# Patient Record
Sex: MALE | Race: WHITE | Employment: OTHER | URBAN - NONMETROPOLITAN AREA
[De-identification: names, ages, dates, MRNs, and addresses within clinical notes are randomized per-mention and may not be internally consistent; named-entity substitution may affect disease eponyms.]

---

## 2017-05-11 ENCOUNTER — TELEPHONE (OUTPATIENT)
Dept: INTERNAL MEDICINE | Age: 82
End: 2017-05-11

## 2017-05-11 ENCOUNTER — OFFICE VISIT (OUTPATIENT)
Dept: INTERNAL MEDICINE | Age: 82
End: 2017-05-11

## 2017-05-11 VITALS
HEIGHT: 72 IN | BODY MASS INDEX: 22.96 KG/M2 | DIASTOLIC BLOOD PRESSURE: 70 MMHG | SYSTOLIC BLOOD PRESSURE: 136 MMHG | WEIGHT: 169.5 LBS

## 2017-05-11 DIAGNOSIS — T50.905S MEDICATION ADVERSE EFFECT, SEQUELA: ICD-10-CM

## 2017-05-11 DIAGNOSIS — I10 ESSENTIAL HYPERTENSION: Primary | ICD-10-CM

## 2017-05-11 PROCEDURE — 99213 OFFICE O/P EST LOW 20 MIN: CPT | Performed by: INTERNAL MEDICINE

## 2017-05-11 PROCEDURE — 93000 ELECTROCARDIOGRAM COMPLETE: CPT | Performed by: INTERNAL MEDICINE

## 2017-05-11 RX ORDER — AMLODIPINE BESYLATE 10 MG/1
TABLET ORAL
Qty: 90 TABLET | Refills: 3 | Status: SHIPPED | OUTPATIENT
Start: 2017-05-11 | End: 2018-02-19 | Stop reason: SDUPTHER

## 2017-05-11 RX ORDER — LISINOPRIL 10 MG/1
TABLET ORAL
Qty: 180 TABLET | Refills: 3 | Status: SHIPPED | OUTPATIENT
Start: 2017-05-11 | End: 2018-02-19 | Stop reason: SDUPTHER

## 2017-05-11 ASSESSMENT — PATIENT HEALTH QUESTIONNAIRE - PHQ9
SUM OF ALL RESPONSES TO PHQ9 QUESTIONS 1 & 2: 0
2. FEELING DOWN, DEPRESSED OR HOPELESS: 0
SUM OF ALL RESPONSES TO PHQ QUESTIONS 1-9: 0
1. LITTLE INTEREST OR PLEASURE IN DOING THINGS: 0

## 2017-06-06 ENCOUNTER — TELEPHONE (OUTPATIENT)
Dept: INTERNAL MEDICINE | Age: 82
End: 2017-06-06

## 2018-02-20 RX ORDER — LISINOPRIL 10 MG/1
TABLET ORAL
Qty: 180 TABLET | Refills: 3 | Status: SHIPPED | OUTPATIENT
Start: 2018-02-20 | End: 2018-12-11 | Stop reason: SDUPTHER

## 2018-02-20 RX ORDER — AMLODIPINE BESYLATE 10 MG/1
TABLET ORAL
Qty: 90 TABLET | Refills: 3 | Status: SHIPPED | OUTPATIENT
Start: 2018-02-20 | End: 2018-12-11 | Stop reason: SDUPTHER

## 2018-04-12 ENCOUNTER — TELEPHONE (OUTPATIENT)
Dept: INTERNAL MEDICINE CLINIC | Age: 83
End: 2018-04-12

## 2018-04-16 ENCOUNTER — TELEPHONE (OUTPATIENT)
Dept: INTERNAL MEDICINE CLINIC | Age: 83
End: 2018-04-16

## 2018-04-17 ENCOUNTER — OFFICE VISIT (OUTPATIENT)
Dept: INTERNAL MEDICINE CLINIC | Age: 83
End: 2018-04-17
Payer: MEDICARE

## 2018-04-17 VITALS
SYSTOLIC BLOOD PRESSURE: 138 MMHG | HEIGHT: 72 IN | DIASTOLIC BLOOD PRESSURE: 78 MMHG | BODY MASS INDEX: 23.16 KG/M2 | HEART RATE: 76 BPM | WEIGHT: 171 LBS | TEMPERATURE: 98 F

## 2018-04-17 DIAGNOSIS — J20.9 ACUTE BRONCHITIS, UNSPECIFIED ORGANISM: Primary | ICD-10-CM

## 2018-04-17 PROCEDURE — G8420 CALC BMI NORM PARAMETERS: HCPCS | Performed by: INTERNAL MEDICINE

## 2018-04-17 PROCEDURE — G8427 DOCREV CUR MEDS BY ELIG CLIN: HCPCS | Performed by: INTERNAL MEDICINE

## 2018-04-17 PROCEDURE — 1036F TOBACCO NON-USER: CPT | Performed by: INTERNAL MEDICINE

## 2018-04-17 PROCEDURE — 4040F PNEUMOC VAC/ADMIN/RCVD: CPT | Performed by: INTERNAL MEDICINE

## 2018-04-17 PROCEDURE — 1123F ACP DISCUSS/DSCN MKR DOCD: CPT | Performed by: INTERNAL MEDICINE

## 2018-04-17 PROCEDURE — 99213 OFFICE O/P EST LOW 20 MIN: CPT | Performed by: INTERNAL MEDICINE

## 2018-04-17 RX ORDER — CETIRIZINE HYDROCHLORIDE 10 MG/1
10 TABLET ORAL DAILY
COMMUNITY
End: 2018-05-02 | Stop reason: ALTCHOICE

## 2018-04-17 RX ORDER — FLUTICASONE PROPIONATE 50 MCG
1 SPRAY, SUSPENSION (ML) NASAL DAILY
COMMUNITY
End: 2018-05-02 | Stop reason: ALTCHOICE

## 2018-04-17 RX ORDER — GUAIFENESIN 600 MG/1
1200 TABLET, EXTENDED RELEASE ORAL 2 TIMES DAILY
COMMUNITY
End: 2018-05-02 | Stop reason: ALTCHOICE

## 2018-04-17 RX ORDER — AZITHROMYCIN 250 MG/1
TABLET, FILM COATED ORAL
Qty: 1 PACKET | Refills: 0 | Status: SHIPPED | OUTPATIENT
Start: 2018-04-17 | End: 2018-05-02 | Stop reason: ALTCHOICE

## 2018-05-02 ENCOUNTER — TELEPHONE (OUTPATIENT)
Dept: INTERNAL MEDICINE CLINIC | Age: 83
End: 2018-05-02

## 2018-05-02 ENCOUNTER — OFFICE VISIT (OUTPATIENT)
Dept: INTERNAL MEDICINE CLINIC | Age: 83
End: 2018-05-02
Payer: MEDICARE

## 2018-05-02 ENCOUNTER — HOSPITAL ENCOUNTER (OUTPATIENT)
Age: 83
Discharge: HOME | End: 2018-05-02
Payer: MEDICARE

## 2018-05-02 VITALS
BODY MASS INDEX: 22.48 KG/M2 | DIASTOLIC BLOOD PRESSURE: 70 MMHG | WEIGHT: 166 LBS | SYSTOLIC BLOOD PRESSURE: 134 MMHG | HEIGHT: 72 IN

## 2018-05-02 DIAGNOSIS — I10 ESSENTIAL HYPERTENSION: Primary | ICD-10-CM

## 2018-05-02 DIAGNOSIS — R19.00 ABDOMINAL MASS, UNSPECIFIED ABDOMINAL LOCATION: ICD-10-CM

## 2018-05-02 DIAGNOSIS — T50.905S ADVERSE EFFECT OF DRUG, SEQUELA: ICD-10-CM

## 2018-05-02 DIAGNOSIS — I10 ESSENTIAL HYPERTENSION: ICD-10-CM

## 2018-05-02 LAB
ALBUMIN SERPL-MCNC: 4.4 G/DL (ref 3.5–5.1)
ALP BLD-CCNC: 125 U/L (ref 38–126)
ALT SERPL-CCNC: 31 U/L (ref 11–66)
ANION GAP SERPL CALCULATED.3IONS-SCNC: 11 MEQ/L (ref 8–16)
AST SERPL-CCNC: 29 U/L (ref 5–40)
BILIRUB SERPL-MCNC: 0.5 MG/DL (ref 0.3–1.2)
BUN BLDV-MCNC: 18 MG/DL (ref 7–22)
CALCIUM SERPL-MCNC: 9.6 MG/DL (ref 8.5–10.5)
CHLORIDE BLD-SCNC: 102 MEQ/L (ref 98–111)
CO2: 30 MEQ/L (ref 23–33)
CREAT SERPL-MCNC: 0.9 MG/DL (ref 0.4–1.2)
GFR SERPL CREATININE-BSD FRML MDRD: 81 ML/MIN/1.73M2
GLUCOSE BLD-MCNC: 99 MG/DL (ref 70–108)
POTASSIUM SERPL-SCNC: 4 MEQ/L (ref 3.5–5.2)
SODIUM BLD-SCNC: 143 MEQ/L (ref 135–145)
TOTAL PROTEIN: 7.6 G/DL (ref 6.1–8)

## 2018-05-02 PROCEDURE — 80053 COMPREHEN METABOLIC PANEL: CPT

## 2018-05-02 PROCEDURE — 1123F ACP DISCUSS/DSCN MKR DOCD: CPT | Performed by: INTERNAL MEDICINE

## 2018-05-02 PROCEDURE — 4040F PNEUMOC VAC/ADMIN/RCVD: CPT | Performed by: INTERNAL MEDICINE

## 2018-05-02 PROCEDURE — 36415 COLL VENOUS BLD VENIPUNCTURE: CPT

## 2018-05-02 PROCEDURE — 1036F TOBACCO NON-USER: CPT | Performed by: INTERNAL MEDICINE

## 2018-05-02 PROCEDURE — 99213 OFFICE O/P EST LOW 20 MIN: CPT | Performed by: INTERNAL MEDICINE

## 2018-05-02 PROCEDURE — G8427 DOCREV CUR MEDS BY ELIG CLIN: HCPCS | Performed by: INTERNAL MEDICINE

## 2018-05-02 PROCEDURE — 93000 ELECTROCARDIOGRAM COMPLETE: CPT | Performed by: INTERNAL MEDICINE

## 2018-05-02 PROCEDURE — G8420 CALC BMI NORM PARAMETERS: HCPCS | Performed by: INTERNAL MEDICINE

## 2018-05-03 ENCOUNTER — TELEPHONE (OUTPATIENT)
Dept: INTERNAL MEDICINE CLINIC | Age: 83
End: 2018-05-03

## 2018-05-07 ENCOUNTER — TELEPHONE (OUTPATIENT)
Dept: INTERNAL MEDICINE CLINIC | Age: 83
End: 2018-05-07

## 2018-05-08 ENCOUNTER — HOSPITAL ENCOUNTER (OUTPATIENT)
Dept: CT IMAGING | Age: 83
Discharge: HOME | End: 2018-05-08
Payer: MEDICARE

## 2018-05-08 ENCOUNTER — TELEPHONE (OUTPATIENT)
Dept: INTERNAL MEDICINE CLINIC | Age: 83
End: 2018-05-08

## 2018-05-08 DIAGNOSIS — R19.00 ABDOMINAL MASS, UNSPECIFIED ABDOMINAL LOCATION: ICD-10-CM

## 2018-05-08 PROCEDURE — 6360000004 HC RX CONTRAST MEDICATION: Performed by: INTERNAL MEDICINE

## 2018-05-08 PROCEDURE — 74177 CT ABD & PELVIS W/CONTRAST: CPT

## 2018-05-08 RX ADMIN — IOPAMIDOL 85 ML: 755 INJECTION, SOLUTION INTRAVENOUS at 13:02

## 2018-05-08 RX ADMIN — IOHEXOL 50 ML: 240 INJECTION, SOLUTION INTRATHECAL; INTRAVASCULAR; INTRAVENOUS; ORAL at 13:02

## 2018-05-15 ENCOUNTER — TELEPHONE (OUTPATIENT)
Dept: INTERNAL MEDICINE CLINIC | Age: 83
End: 2018-05-15

## 2018-06-06 ENCOUNTER — OFFICE VISIT (OUTPATIENT)
Dept: INTERNAL MEDICINE CLINIC | Age: 83
End: 2018-06-06
Payer: MEDICARE

## 2018-06-06 VITALS
HEART RATE: 68 BPM | WEIGHT: 168 LBS | DIASTOLIC BLOOD PRESSURE: 80 MMHG | BODY MASS INDEX: 22.75 KG/M2 | SYSTOLIC BLOOD PRESSURE: 136 MMHG | HEIGHT: 72 IN

## 2018-06-06 DIAGNOSIS — Z86.010 HX OF ADENOMATOUS COLONIC POLYPS: Primary | ICD-10-CM

## 2018-06-06 PROCEDURE — 1123F ACP DISCUSS/DSCN MKR DOCD: CPT | Performed by: INTERNAL MEDICINE

## 2018-06-06 PROCEDURE — G8420 CALC BMI NORM PARAMETERS: HCPCS | Performed by: INTERNAL MEDICINE

## 2018-06-06 PROCEDURE — 1036F TOBACCO NON-USER: CPT | Performed by: INTERNAL MEDICINE

## 2018-06-06 PROCEDURE — G8427 DOCREV CUR MEDS BY ELIG CLIN: HCPCS | Performed by: INTERNAL MEDICINE

## 2018-06-06 PROCEDURE — 99203 OFFICE O/P NEW LOW 30 MIN: CPT | Performed by: INTERNAL MEDICINE

## 2018-06-06 PROCEDURE — 4040F PNEUMOC VAC/ADMIN/RCVD: CPT | Performed by: INTERNAL MEDICINE

## 2018-06-06 RX ORDER — POLYETHYLENE GLYCOL 3350 17 G/17G
250 POWDER, FOR SOLUTION ORAL DAILY
Qty: 250 BOTTLE | Refills: 0 | Status: SHIPPED | OUTPATIENT
Start: 2018-06-06 | End: 2018-06-07

## 2018-06-06 RX ORDER — CETIRIZINE HYDROCHLORIDE 10 MG/1
10 TABLET ORAL DAILY PRN
COMMUNITY
End: 2019-03-21

## 2018-06-06 ASSESSMENT — PATIENT HEALTH QUESTIONNAIRE - PHQ9
SUM OF ALL RESPONSES TO PHQ QUESTIONS 1-9: 0
1. LITTLE INTEREST OR PLEASURE IN DOING THINGS: 0
2. FEELING DOWN, DEPRESSED OR HOPELESS: 0
SUM OF ALL RESPONSES TO PHQ9 QUESTIONS 1 & 2: 0

## 2018-06-11 ENCOUNTER — HOSPITAL ENCOUNTER (OUTPATIENT)
Age: 83
Setting detail: OUTPATIENT SURGERY
Discharge: HOME | End: 2018-06-11
Attending: INTERNAL MEDICINE | Admitting: INTERNAL MEDICINE
Payer: MEDICARE

## 2018-06-11 VITALS
HEIGHT: 72 IN | OXYGEN SATURATION: 98 % | DIASTOLIC BLOOD PRESSURE: 83 MMHG | WEIGHT: 162.8 LBS | SYSTOLIC BLOOD PRESSURE: 159 MMHG | HEART RATE: 65 BPM | RESPIRATION RATE: 18 BRPM | TEMPERATURE: 97.4 F | BODY MASS INDEX: 22.05 KG/M2

## 2018-06-11 PROCEDURE — 3609027000 HC COLONOSCOPY: Performed by: INTERNAL MEDICINE

## 2018-06-11 PROCEDURE — 99153 MOD SED SAME PHYS/QHP EA: CPT | Performed by: INTERNAL MEDICINE

## 2018-06-11 PROCEDURE — 2580000003 HC RX 258: Performed by: INTERNAL MEDICINE

## 2018-06-11 PROCEDURE — 99152 MOD SED SAME PHYS/QHP 5/>YRS: CPT | Performed by: INTERNAL MEDICINE

## 2018-06-11 PROCEDURE — G0105 COLORECTAL SCRN; HI RISK IND: HCPCS | Performed by: INTERNAL MEDICINE

## 2018-06-11 PROCEDURE — 6360000002 HC RX W HCPCS: Performed by: INTERNAL MEDICINE

## 2018-06-11 RX ORDER — MIDAZOLAM HYDROCHLORIDE 1 MG/ML
INJECTION INTRAMUSCULAR; INTRAVENOUS PRN
Status: DISCONTINUED | OUTPATIENT
Start: 2018-06-11 | End: 2018-06-11 | Stop reason: HOSPADM

## 2018-06-11 RX ORDER — MEPERIDINE HYDROCHLORIDE 50 MG/ML
INJECTION INTRAMUSCULAR; INTRAVENOUS; SUBCUTANEOUS PRN
Status: DISCONTINUED | OUTPATIENT
Start: 2018-06-11 | End: 2018-06-11 | Stop reason: HOSPADM

## 2018-06-11 RX ORDER — SODIUM CHLORIDE 450 MG/100ML
INJECTION, SOLUTION INTRAVENOUS CONTINUOUS
Status: DISCONTINUED | OUTPATIENT
Start: 2018-06-11 | End: 2018-06-11 | Stop reason: HOSPADM

## 2018-06-11 RX ADMIN — SODIUM CHLORIDE: 4.5 INJECTION, SOLUTION INTRAVENOUS at 12:07

## 2018-06-11 ASSESSMENT — PAIN SCALES - GENERAL
PAINLEVEL_OUTOF10: 0
PAINLEVEL_OUTOF10: 0

## 2018-06-11 ASSESSMENT — PAIN - FUNCTIONAL ASSESSMENT: PAIN_FUNCTIONAL_ASSESSMENT: 0-10

## 2018-08-16 ENCOUNTER — HOSPITAL ENCOUNTER (EMERGENCY)
Age: 83
Discharge: HOME | End: 2018-08-16
Payer: MEDICARE

## 2018-08-16 VITALS
SYSTOLIC BLOOD PRESSURE: 177 MMHG | WEIGHT: 170 LBS | DIASTOLIC BLOOD PRESSURE: 80 MMHG | HEART RATE: 70 BPM | BODY MASS INDEX: 23.06 KG/M2 | OXYGEN SATURATION: 96 % | TEMPERATURE: 97.7 F | RESPIRATION RATE: 18 BRPM

## 2018-08-16 DIAGNOSIS — H66.012 ACUTE SUPPURATIVE OTITIS MEDIA OF LEFT EAR WITH SPONTANEOUS RUPTURE OF TYMPANIC MEMBRANE, RECURRENCE NOT SPECIFIED: Primary | ICD-10-CM

## 2018-08-16 PROCEDURE — 99213 OFFICE O/P EST LOW 20 MIN: CPT

## 2018-08-16 PROCEDURE — 99213 OFFICE O/P EST LOW 20 MIN: CPT | Performed by: NURSE PRACTITIONER

## 2018-08-16 RX ORDER — AMOXICILLIN AND CLAVULANATE POTASSIUM 875; 125 MG/1; MG/1
1 TABLET, FILM COATED ORAL 2 TIMES DAILY
Qty: 14 TABLET | Refills: 0 | Status: SHIPPED | OUTPATIENT
Start: 2018-08-16 | End: 2018-08-23

## 2018-08-16 RX ORDER — OFLOXACIN 3 MG/ML
10 SOLUTION AURICULAR (OTIC) DAILY
Qty: 10 ML | Refills: 0 | Status: SHIPPED | OUTPATIENT
Start: 2018-08-16 | End: 2018-08-23

## 2018-08-16 ASSESSMENT — PAIN DESCRIPTION - ORIENTATION: ORIENTATION: LEFT

## 2018-08-16 ASSESSMENT — ENCOUNTER SYMPTOMS
COUGH: 1
WHEEZING: 0
FACIAL SWELLING: 0
SHORTNESS OF BREATH: 0
CHEST TIGHTNESS: 0
SINUS PRESSURE: 0
RHINORRHEA: 1
COLOR CHANGE: 0
SORE THROAT: 0
TROUBLE SWALLOWING: 0
SINUS PAIN: 0

## 2018-08-16 ASSESSMENT — PAIN DESCRIPTION - LOCATION: LOCATION: EAR

## 2018-08-16 ASSESSMENT — PAIN DESCRIPTION - PAIN TYPE: TYPE: ACUTE PAIN

## 2018-08-16 ASSESSMENT — PAIN DESCRIPTION - DESCRIPTORS: DESCRIPTORS: ACHING

## 2018-08-16 ASSESSMENT — PAIN SCALES - GENERAL: PAINLEVEL_OUTOF10: 1

## 2018-08-16 NOTE — ED TRIAGE NOTES
Pt walked to room 7. Pt here with left ear pain. Started 4 days ago. Pt states he has been using mucinex, flonase and has been using cipro. Pt states feels like a lot of fluid in ear. Pt has been swimming.

## 2018-08-16 NOTE — ED PROVIDER NOTES
tonsillar, no preauricular, no posterior auricular and no occipital adenopathy present. Head (left side): No submental, no submandibular, no tonsillar, no preauricular, no posterior auricular and no occipital adenopathy present. He has no cervical adenopathy. He has no axillary adenopathy. Neurological: He is alert and oriented to person, place, and time. Skin: Skin is warm, dry and intact. No rash noted. Nursing note and vitals reviewed. DIAGNOSTIC RESULTS     Labs:No results found for this visit on 08/16/18. IMAGING:    No orders to display         EKG:      URGENT CARE COURSE:     Vitals:    08/16/18 0808   BP: (!) 177/80   Pulse: 70   Resp: 18   Temp: 97.7 °F (36.5 °C)   TempSrc: Oral   SpO2: 96%   Weight: 170 lb (77.1 kg)       Medications - No data to display         PROCEDURES:  None    FINAL IMPRESSION      1. Acute suppurative otitis media of left ear with spontaneous rupture of tympanic membrane, recurrence not specified          DISPOSITION/PLAN     The patient's physical exam is consistent with otitis media of the left ear with spontaneous tympanic membrane rupture. The patient was started on Augmentin and given ofloxacin. He is to take the medication as prescribed. He is to follow-up with his PCP in a week. He is to go to the ER for any worsening symptoms. The patient verbalized understanding of this plan of care. The patient was ambulatory out of the department in stable condition. The patient was agreeable to the plan of care and voiced no concerns at time of discharge.       PATIENT REFERRED TO:  Ramiro Erickson MD  [facility] [address] [address]      DISCHARGE MEDICATIONS:  Discharge Medication List as of 8/16/2018  8:30 AM      START taking these medications    Details   ofloxacin (FLOXIN) 0.3 % otic solution Place 10 drops into the left ear daily for 7 days, Disp-10 mL, R-0Normal      amoxicillin-clavulanate (AUGMENTIN) 875-125 MG per tablet Take 1 tablet by mouth 2 times daily for 7 days, Disp-14 tablet, R-0Normal             Discharge Medication List as of 8/16/2018  8:30 AM          Discharge Medication List as of 8/16/2018  8:30 AM          MARIKA Burns CNP    (Please note that portions of this note were completed with a voice recognition program.  Efforts were made to edit the dictations but occasionally words are mis-transcribed.)           MARIKA Burns CNP  08/16/18 2634

## 2018-08-27 ENCOUNTER — HOSPITAL ENCOUNTER (EMERGENCY)
Age: 83
Discharge: HOME | End: 2018-08-27
Payer: MEDICARE

## 2018-08-27 VITALS
TEMPERATURE: 98.6 F | HEIGHT: 72 IN | RESPIRATION RATE: 16 BRPM | SYSTOLIC BLOOD PRESSURE: 186 MMHG | DIASTOLIC BLOOD PRESSURE: 84 MMHG | WEIGHT: 170 LBS | OXYGEN SATURATION: 96 % | HEART RATE: 84 BPM | BODY MASS INDEX: 23.03 KG/M2

## 2018-08-27 DIAGNOSIS — H72.92 TYMPANIC MEMBRANE RUPTURE, LEFT: Primary | ICD-10-CM

## 2018-08-27 PROCEDURE — 2709999900 HC NON-CHARGEABLE SUPPLY

## 2018-08-27 PROCEDURE — 99212 OFFICE O/P EST SF 10 MIN: CPT | Performed by: NURSE PRACTITIONER

## 2018-08-27 PROCEDURE — 99212 OFFICE O/P EST SF 10 MIN: CPT

## 2018-08-27 ASSESSMENT — ENCOUNTER SYMPTOMS
SHORTNESS OF BREATH: 0
ABDOMINAL PAIN: 0
CHEST TIGHTNESS: 0
WHEEZING: 0
VOMITING: 0
NAUSEA: 0

## 2018-08-27 ASSESSMENT — PAIN DESCRIPTION - LOCATION: LOCATION: EAR

## 2018-08-27 ASSESSMENT — PAIN DESCRIPTION - ORIENTATION: ORIENTATION: LEFT

## 2018-08-27 ASSESSMENT — PAIN DESCRIPTION - DESCRIPTORS: DESCRIPTORS: DISCOMFORT

## 2018-08-27 NOTE — ED PROVIDER NOTES
[facility]  Urgent Care Encounter       CHIEF COMPLAINT       Chief Complaint   Patient presents with    Otalgia     (L)       Nurses Notes reviewed and I agree except as noted in the HPI. HISTORY OF PRESENT ILLNESS   Warren Acevedo is a 80 y.o. male who presents For reevaluation of the left ear. Patient reports that he was seen approximately 10 days ago for left ear pressure and pain. He states that he was told at that time that he had an ear infection with a ruptured membrane. He states that he was told to follow-up in approximately one week and he came here for follow-up. Denies any pain or pressure and states that he is currently feeling much better. He does report intermittent \"clear drainage\" but states that he thinks it could be related to the eardrops he has been using. No other issues voiced. HPI    REVIEW OF SYSTEMS     Review of Systems   Constitutional: Negative for chills, fatigue and fever. HENT: Positive for ear discharge. Negative for ear pain. Respiratory: Negative for chest tightness, shortness of breath and wheezing. Cardiovascular: Negative for chest pain. Gastrointestinal: Negative for abdominal pain, nausea and vomiting. Skin: Negative for rash. PAST MEDICAL HISTORY         Diagnosis Date    Hyperlipidemia     Hypertension     Prostate cancer [facility])        SURGICAL HISTORY     Patient  has a past surgical history that includes hernia repair; Hemorrhoid surgery (1970); Prostate surgery (2005); Tonsillectomy; Colonoscopy; and pr colonoscopy flx dx w/collj spec when pfrmd (Left, 6/11/2018).     CURRENT MEDICATIONS       Previous Medications    AMLODIPINE (NORVASC) 10 MG TABLET    TAKE 1 TABLET EVERY DAY    CETIRIZINE (ZYRTEC) 10 MG TABLET    Take 10 mg by mouth daily as needed for Allergies    LISINOPRIL (PRINIVIL;ZESTRIL) 10 MG TABLET    TAKE 1 TABLET TWICE DAILY    MULTIPLE VITAMIN PO    Take by mouth daily       ALLERGIES     Patient is has No Known Allergies. Patients   Immunization History   Administered Date(s) Administered    Influenza Vaccine, unspecified formulation 09/18/2015    Pneumococcal 13-valent Conjugate ([facility] [place]) 06/15/2015    Tdap (Boostrix, Adacel) 09/15/2014    Zoster Live (Zostavax) 09/15/2014       FAMILY HISTORY     Patient's family history includes Diabetes in his father. SOCIAL HISTORY     Patient  reports that he quit smoking about 34 years ago. He has never used smokeless tobacco. He reports that he does not drink alcohol or use drugs. PHYSICAL EXAM     ED TRIAGE VITALS  BP: (!) 186/84, Temp: 98.6 °F (37 °C), Pulse: 84, Resp: 16, SpO2: 96 %,Estimated body mass index is 23.06 kg/m² as calculated from the following:    Height as of this encounter: 6' (1.829 m). Weight as of this encounter: 170 lb (77.1 kg). ,No LMP for male patient. Physical Exam   Constitutional: He is oriented to person, place, and time. He appears well-developed and well-nourished. No distress. HENT:   Right Ear: Tympanic membrane normal.   Left auditory canal is erythematous, however the tympanic membrane on the left side appears to be healed. Pulmonary/Chest: Effort normal. No respiratory distress. Neurological: He is alert and oriented to person, place, and time. No cranial nerve deficit. Skin: Skin is warm and dry. No rash noted. He is not diaphoretic. Psychiatric: He has a normal mood and affect. Nursing note and vitals reviewed. DIAGNOSTIC RESULTS     Labs:No results found for this visit on 08/27/18. IMAGING:    No orders to display         EKG:None      URGENT CARE COURSE:     Vitals:    08/27/18 0849 08/27/18 0850   BP:  (!) 186/84   Pulse: 84    Resp: 16    Temp: 98.6 °F (37 °C)    TempSrc: Temporal    SpO2: 96%    Weight: 170 lb (77.1 kg)    Height: 6' (1.829 m)        Medications - No data to display         PROCEDURES:  None    FINAL IMPRESSION      1.  Tympanic membrane rupture, left

## 2018-12-12 RX ORDER — LISINOPRIL 10 MG/1
TABLET ORAL
Qty: 180 TABLET | Refills: 3 | Status: SHIPPED | OUTPATIENT
Start: 2018-12-12 | End: 2019-10-01 | Stop reason: SDUPTHER

## 2018-12-12 RX ORDER — AMLODIPINE BESYLATE 10 MG/1
TABLET ORAL
Qty: 90 TABLET | Refills: 3 | Status: SHIPPED | OUTPATIENT
Start: 2018-12-12 | End: 2019-10-01 | Stop reason: SDUPTHER

## 2018-12-20 ENCOUNTER — TELEPHONE (OUTPATIENT)
Dept: INTERNAL MEDICINE CLINIC | Age: 83
End: 2018-12-20

## 2018-12-20 DIAGNOSIS — M81.0 OSTEOPOROSIS, UNSPECIFIED OSTEOPOROSIS TYPE, UNSPECIFIED PATHOLOGICAL FRACTURE PRESENCE: Primary | ICD-10-CM

## 2018-12-20 NOTE — TELEPHONE ENCOUNTER
Pt dropped off life line screening. Showing a small abdominal aneurysm and that he could also be osteopenic. Pt is wondering if there is anything to do with this information. Per Dr. Rosemarie Guillen only thing to be done is checking in to the osteoporosis further which would consist of a dexa scan. Pt ws notified and verbalizes understanding. Would like to proceed with dexa scan. Will have  contact pt with date and time.

## 2018-12-26 ENCOUNTER — HOSPITAL ENCOUNTER (OUTPATIENT)
Dept: WOMENS IMAGING | Age: 83
Discharge: HOME | End: 2018-12-26
Payer: MEDICARE

## 2018-12-26 DIAGNOSIS — M81.0 OSTEOPOROSIS, UNSPECIFIED OSTEOPOROSIS TYPE, UNSPECIFIED PATHOLOGICAL FRACTURE PRESENCE: ICD-10-CM

## 2018-12-26 PROCEDURE — 77080 DXA BONE DENSITY AXIAL: CPT

## 2018-12-28 ENCOUNTER — TELEPHONE (OUTPATIENT)
Dept: INTERNAL MEDICINE CLINIC | Age: 83
End: 2018-12-28

## 2018-12-31 ENCOUNTER — TELEPHONE (OUTPATIENT)
Dept: INTERNAL MEDICINE CLINIC | Age: 83
End: 2018-12-31

## 2018-12-31 RX ORDER — ALENDRONATE SODIUM 70 MG/1
70 TABLET ORAL
Qty: 12 TABLET | Refills: 3 | Status: SHIPPED | OUTPATIENT
Start: 2018-12-31 | End: 2019-03-21

## 2019-03-21 ENCOUNTER — HOSPITAL ENCOUNTER (EMERGENCY)
Age: 84
Discharge: HOME | End: 2019-03-21
Attending: EMERGENCY MEDICINE
Payer: MEDICARE

## 2019-03-21 VITALS
SYSTOLIC BLOOD PRESSURE: 188 MMHG | OXYGEN SATURATION: 96 % | RESPIRATION RATE: 16 BRPM | HEIGHT: 72 IN | DIASTOLIC BLOOD PRESSURE: 83 MMHG | WEIGHT: 175 LBS | HEART RATE: 83 BPM | BODY MASS INDEX: 23.7 KG/M2 | TEMPERATURE: 98.3 F

## 2019-03-21 DIAGNOSIS — S63.501A RIGHT WRIST SPRAIN, INITIAL ENCOUNTER: Primary | ICD-10-CM

## 2019-03-21 PROCEDURE — 99213 OFFICE O/P EST LOW 20 MIN: CPT | Performed by: EMERGENCY MEDICINE

## 2019-03-21 PROCEDURE — 2709999900 HC NON-CHARGEABLE SUPPLY

## 2019-03-21 PROCEDURE — 99212 OFFICE O/P EST SF 10 MIN: CPT

## 2019-03-21 RX ORDER — NAPROXEN 500 MG/1
500 TABLET ORAL 2 TIMES DAILY WITH MEALS
Qty: 20 TABLET | Refills: 0 | Status: SHIPPED | OUTPATIENT
Start: 2019-03-21 | End: 2019-05-01

## 2019-03-21 ASSESSMENT — ENCOUNTER SYMPTOMS
EYE PAIN: 0
ABDOMINAL PAIN: 0
COUGH: 0
SINUS PRESSURE: 0
VOICE CHANGE: 0
SHORTNESS OF BREATH: 0
STRIDOR: 0
BACK PAIN: 0
SORE THROAT: 0
NAUSEA: 0
TROUBLE SWALLOWING: 0
EYE DISCHARGE: 0
DIARRHEA: 0
VOMITING: 0
WHEEZING: 0
EYE REDNESS: 0

## 2019-03-21 ASSESSMENT — PAIN SCALES - GENERAL: PAINLEVEL_OUTOF10: 8

## 2019-03-21 ASSESSMENT — PAIN DESCRIPTION - LOCATION: LOCATION: WRIST

## 2019-03-22 ENCOUNTER — TELEPHONE (OUTPATIENT)
Dept: INTERNAL MEDICINE CLINIC | Age: 84
End: 2019-03-22

## 2019-03-22 DIAGNOSIS — M25.439 WRIST SWELLING: Primary | ICD-10-CM

## 2019-03-22 DIAGNOSIS — M25.539 PAIN IN WRIST, UNSPECIFIED LATERALITY: ICD-10-CM

## 2019-03-23 ENCOUNTER — HOSPITAL ENCOUNTER (OUTPATIENT)
Age: 84
Discharge: HOME | End: 2019-03-23
Payer: MEDICARE

## 2019-03-23 DIAGNOSIS — M25.439 WRIST SWELLING: ICD-10-CM

## 2019-03-23 DIAGNOSIS — M25.539 PAIN IN WRIST, UNSPECIFIED LATERALITY: ICD-10-CM

## 2019-03-23 LAB — URIC ACID: 5.1 MG/DL (ref 3.7–7)

## 2019-03-23 PROCEDURE — 84550 ASSAY OF BLOOD/URIC ACID: CPT

## 2019-03-23 PROCEDURE — 36415 COLL VENOUS BLD VENIPUNCTURE: CPT

## 2019-03-25 ENCOUNTER — TELEPHONE (OUTPATIENT)
Dept: INTERNAL MEDICINE CLINIC | Age: 84
End: 2019-03-25

## 2019-05-01 ENCOUNTER — OFFICE VISIT (OUTPATIENT)
Dept: INTERNAL MEDICINE CLINIC | Age: 84
End: 2019-05-01
Payer: MEDICARE

## 2019-05-01 VITALS
BODY MASS INDEX: 24.04 KG/M2 | HEIGHT: 72 IN | WEIGHT: 177.5 LBS | TEMPERATURE: 98.5 F | DIASTOLIC BLOOD PRESSURE: 76 MMHG | SYSTOLIC BLOOD PRESSURE: 134 MMHG

## 2019-05-01 DIAGNOSIS — R50.81 FEVER IN OTHER DISEASES: ICD-10-CM

## 2019-05-01 DIAGNOSIS — T50.905S ADVERSE EFFECT OF DRUG, SEQUELA: ICD-10-CM

## 2019-05-01 DIAGNOSIS — I10 ESSENTIAL HYPERTENSION: Primary | ICD-10-CM

## 2019-05-01 PROCEDURE — 4040F PNEUMOC VAC/ADMIN/RCVD: CPT | Performed by: INTERNAL MEDICINE

## 2019-05-01 PROCEDURE — G8427 DOCREV CUR MEDS BY ELIG CLIN: HCPCS | Performed by: INTERNAL MEDICINE

## 2019-05-01 PROCEDURE — G8420 CALC BMI NORM PARAMETERS: HCPCS | Performed by: INTERNAL MEDICINE

## 2019-05-01 PROCEDURE — 1036F TOBACCO NON-USER: CPT | Performed by: INTERNAL MEDICINE

## 2019-05-01 PROCEDURE — 93000 ELECTROCARDIOGRAM COMPLETE: CPT | Performed by: INTERNAL MEDICINE

## 2019-05-01 PROCEDURE — 99213 OFFICE O/P EST LOW 20 MIN: CPT | Performed by: INTERNAL MEDICINE

## 2019-05-01 PROCEDURE — 1123F ACP DISCUSS/DSCN MKR DOCD: CPT | Performed by: INTERNAL MEDICINE

## 2019-05-01 ASSESSMENT — PATIENT HEALTH QUESTIONNAIRE - PHQ9
SUM OF ALL RESPONSES TO PHQ9 QUESTIONS 1 & 2: 0
2. FEELING DOWN, DEPRESSED OR HOPELESS: 0
SUM OF ALL RESPONSES TO PHQ QUESTIONS 1-9: 0
SUM OF ALL RESPONSES TO PHQ QUESTIONS 1-9: 0
1. LITTLE INTEREST OR PLEASURE IN DOING THINGS: 0

## 2019-05-01 NOTE — PROGRESS NOTES
YOB: 1935    Chief Complaint   Patient presents with    Other     fever and chills x 2 days-took aleve for fever    Hypertension     NEW SXS: FEVER, SWEATS X 2 DAYS; no cough, dysuria, myalgia, rhinorrhea    This patient presents for medical evaluation. I last saw the patient 1y  ago. This patient is followed regularly by Dr. Julita Lopez    Otherwise the pt is active in long-term; he exercises regularly, goes to meetings, does yardwork. Patient Active Problem List   Diagnosis    Hyperlipidemia    Hypertension    Prostate cancer, T1c. IMRT+ Brachytherapy, 2005    Encounter for colonoscopy due to history of adenomatous colonic polyps       Current Outpatient Medications   Medication Sig Dispense Refill    amLODIPine (NORVASC) 10 MG tablet TAKE 1 TABLET EVERY DAY 90 tablet 3    lisinopril (PRINIVIL;ZESTRIL) 10 MG tablet TAKE 1 TABLET TWICE DAILY 180 tablet 3    MULTIPLE VITAMIN PO Take by mouth daily       No current facility-administered medications for this visit. No Known Allergies    I have reviewed the patient's medications, as well as, their past medical, social, and family history as appropriate. Review of Systems - General ROS: negative  Psychological ROS: negative  Hematological and Lymphatic ROS: No history of blood clots or bleeding disorder. Respiratory ROS: no cough, shortness of breath, or wheezing  Cardiovascular ROS: no chest pain or dyspnea on exertion  Gastrointestinal ROS: no abdominal pain, change in bowel habits, or black or bloody stools  Genito-Urinary ROS: nocturia x 3; no hesitancy  Musculoskeletal ROS: negative  Neurological ROS: no TIA or stroke symptoms  Dermatological ROS: negative    Blood pressure 134/76, temperature 98.5 °F (36.9 °C), height 6' 0.01\" (1.829 m), weight 177 lb 8 oz (80.5 kg).     Physical Examination: General appearance - alert, well appearing, and in no distress  Mental status - alert, oriented to person, place, and time  Neck - supple, no significant adenopathy, no JVD, or carotid bruits  Chest - clear to auscultation, no wheezes, rales or rhonchi, symmetric air entry  Heart - normal rate, regular rhythm, normal S1, S2, no murmurs, rubs, clicks or gallops  Abdomen - soft, nontender, nondistended, no masses or organomegaly  Neurological - alert, oriented, normal speech, no focal findings or movement disorder noted  Musculoskeletal - no joint tenderness, deformity or swelling  Extremities - peripheral pulses normal, no pedal edema, no clubbing or cyanosis  Skin - normal coloration and turgor, no rashes, no suspicious skin lesions noted     Prostate- not palpable     Diagnosis Orders   1. Essential hypertension  EKG 12 Lead    Basic Metabolic Panel   2. Adverse effect of drug, sequela  Basic Metabolic Panel       Orders Placed This Encounter   Procedures    Basic Metabolic Panel     Standing Status:   Future     Standing Expiration Date:   4/30/2020    EKG 12 Lead     Order Specific Question:   Reason for Exam?     Answer: Other        IMP/PLAN:    1. hbp- controlled  2. Fever- suspect mild flu- he is content with treating symptomatically; advised to call if feels worse or fever does not subside        Will schedule follow up appointment in 1y. Continue medications at current doses. Signed By:  Noland Bream Calhoun Severs, M.D.

## 2019-05-02 ENCOUNTER — NURSE ONLY (OUTPATIENT)
Dept: LAB | Age: 84
End: 2019-05-02

## 2019-05-02 DIAGNOSIS — I10 ESSENTIAL HYPERTENSION: ICD-10-CM

## 2019-05-02 DIAGNOSIS — T50.905S ADVERSE EFFECT OF DRUG, SEQUELA: ICD-10-CM

## 2019-05-02 LAB
ANION GAP SERPL CALCULATED.3IONS-SCNC: 11 MEQ/L (ref 8–16)
BUN BLDV-MCNC: 18 MG/DL (ref 7–22)
CALCIUM SERPL-MCNC: 9.2 MG/DL (ref 8.5–10.5)
CHLORIDE BLD-SCNC: 104 MEQ/L (ref 98–111)
CO2: 27 MEQ/L (ref 23–33)
CREAT SERPL-MCNC: 0.9 MG/DL (ref 0.4–1.2)
GFR SERPL CREATININE-BSD FRML MDRD: 80 ML/MIN/1.73M2
GLUCOSE BLD-MCNC: 103 MG/DL (ref 70–108)
POTASSIUM SERPL-SCNC: 3.6 MEQ/L (ref 3.5–5.2)
SODIUM BLD-SCNC: 142 MEQ/L (ref 135–145)

## 2019-05-06 ENCOUNTER — TELEPHONE (OUTPATIENT)
Dept: INTERNAL MEDICINE CLINIC | Age: 84
End: 2019-05-06

## 2019-05-14 ENCOUNTER — TELEPHONE (OUTPATIENT)
Dept: INTERNAL MEDICINE CLINIC | Age: 84
End: 2019-05-14

## 2019-05-14 NOTE — TELEPHONE ENCOUNTER
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   Suggest he Google it

## 2019-05-14 NOTE — TELEPHONE ENCOUNTER
Patient had an episode yesterday over a 3 hr period where he had memory loss and was very tired. He talked with someone at [facility] today and they thought that what he was describing could have been a TIA. Later yesterday he felt fine and today he feels fine but wonders if that is what is was.

## 2019-05-16 NOTE — TELEPHONE ENCOUNTER
Pt called back saying he googled the information and sounds spot on. Pt is feeling back to normal and appreciates everything Dr. Jacquelyn Madrigal has done for him.

## 2019-10-02 RX ORDER — AMLODIPINE BESYLATE 10 MG/1
TABLET ORAL
Qty: 90 TABLET | Refills: 3 | Status: SHIPPED | OUTPATIENT
Start: 2019-10-02 | End: 2020-07-13

## 2019-10-02 RX ORDER — LISINOPRIL 10 MG/1
TABLET ORAL
Qty: 180 TABLET | Refills: 3 | Status: SHIPPED | OUTPATIENT
Start: 2019-10-02 | End: 2020-07-13

## 2019-10-28 ENCOUNTER — HOSPITAL ENCOUNTER (EMERGENCY)
Age: 84
Discharge: HOME | End: 2019-10-28
Payer: MEDICARE

## 2019-10-28 VITALS
RESPIRATION RATE: 18 BRPM | WEIGHT: 175 LBS | HEART RATE: 73 BPM | SYSTOLIC BLOOD PRESSURE: 175 MMHG | DIASTOLIC BLOOD PRESSURE: 81 MMHG | BODY MASS INDEX: 23.73 KG/M2 | OXYGEN SATURATION: 94 % | TEMPERATURE: 98 F

## 2019-10-28 DIAGNOSIS — J40 BRONCHITIS: ICD-10-CM

## 2019-10-28 DIAGNOSIS — J06.9 UPPER RESPIRATORY TRACT INFECTION, UNSPECIFIED TYPE: Primary | ICD-10-CM

## 2019-10-28 PROCEDURE — 99212 OFFICE O/P EST SF 10 MIN: CPT

## 2019-10-28 PROCEDURE — 99214 OFFICE O/P EST MOD 30 MIN: CPT | Performed by: NURSE PRACTITIONER

## 2019-10-28 RX ORDER — AZITHROMYCIN 250 MG/1
TABLET, FILM COATED ORAL
Qty: 6 TABLET | Refills: 0 | Status: SHIPPED | OUTPATIENT
Start: 2019-10-28 | End: 2019-12-20

## 2019-10-28 RX ORDER — DEXTROMETHORPHAN HYDROBROMIDE AND PROMETHAZINE HYDROCHLORIDE 15; 6.25 MG/5ML; MG/5ML
5 SYRUP ORAL 4 TIMES DAILY PRN
Qty: 120 ML | Refills: 0 | Status: SHIPPED | OUTPATIENT
Start: 2019-10-28 | End: 2019-11-04

## 2019-10-28 RX ORDER — LANOLIN ALCOHOL/MO/W.PET/CERES
3 CREAM (GRAM) TOPICAL DAILY
COMMUNITY
End: 2020-08-10 | Stop reason: ALTCHOICE

## 2019-10-28 ASSESSMENT — ENCOUNTER SYMPTOMS
COUGH: 1
RHINORRHEA: 1
SINUS PAIN: 1
EYE DISCHARGE: 0
VOMITING: 0
NAUSEA: 0
EYE ITCHING: 0
SHORTNESS OF BREATH: 0
DIARRHEA: 0
SORE THROAT: 0

## 2019-12-20 ENCOUNTER — HOSPITAL ENCOUNTER (EMERGENCY)
Age: 84
Discharge: HOME | End: 2019-12-20
Payer: MEDICARE

## 2019-12-20 VITALS
WEIGHT: 175 LBS | RESPIRATION RATE: 18 BRPM | HEART RATE: 77 BPM | SYSTOLIC BLOOD PRESSURE: 154 MMHG | BODY MASS INDEX: 23.73 KG/M2 | OXYGEN SATURATION: 94 % | DIASTOLIC BLOOD PRESSURE: 76 MMHG | TEMPERATURE: 97.9 F

## 2019-12-20 DIAGNOSIS — J06.9 UPPER RESPIRATORY TRACT INFECTION, UNSPECIFIED TYPE: Primary | ICD-10-CM

## 2019-12-20 PROCEDURE — 99213 OFFICE O/P EST LOW 20 MIN: CPT | Performed by: NURSE PRACTITIONER

## 2019-12-20 PROCEDURE — 99212 OFFICE O/P EST SF 10 MIN: CPT

## 2019-12-20 RX ORDER — CEFDINIR 300 MG/1
300 CAPSULE ORAL 2 TIMES DAILY
Qty: 14 CAPSULE | Refills: 0 | Status: SHIPPED | OUTPATIENT
Start: 2019-12-20 | End: 2019-12-27

## 2019-12-20 ASSESSMENT — ENCOUNTER SYMPTOMS
SHORTNESS OF BREATH: 0
SORE THROAT: 0
VOICE CHANGE: 1
VOMITING: 0
COUGH: 0
NAUSEA: 0

## 2020-01-16 ENCOUNTER — TELEPHONE (OUTPATIENT)
Dept: INTERNAL MEDICINE CLINIC | Age: 85
End: 2020-01-16

## 2020-01-16 NOTE — TELEPHONE ENCOUNTER
----- Message from Michael Albert MD sent at 1/15/2020  3:51 PM EST -----  SEE IF HE GOT MRI  SEE EMI MAI

## 2020-01-20 ENCOUNTER — HOSPITAL ENCOUNTER (OUTPATIENT)
Dept: MRI IMAGING | Age: 85
Discharge: HOME | End: 2020-01-20
Payer: MEDICARE

## 2020-01-20 LAB — POC CREATININE WHOLE BLOOD: 1 MG/DL (ref 0.5–1.2)

## 2020-01-20 PROCEDURE — 70553 MRI BRAIN STEM W/O & W/DYE: CPT

## 2020-01-20 PROCEDURE — 6360000004 HC RX CONTRAST MEDICATION: Performed by: OPHTHALMOLOGY

## 2020-01-20 PROCEDURE — A9579 GAD-BASE MR CONTRAST NOS,1ML: HCPCS | Performed by: OPHTHALMOLOGY

## 2020-01-20 PROCEDURE — 82565 ASSAY OF CREATININE: CPT

## 2020-01-20 RX ADMIN — GADOTERIDOL 15 ML: 279.3 INJECTION, SOLUTION INTRAVENOUS at 15:40

## 2020-01-21 NOTE — TELEPHONE ENCOUNTER
Notified pt MRI is OK. Advised pt Dr Sejal Mar would like to see him for an appt. We scheduled appt on 1/28/20.

## 2020-01-28 ENCOUNTER — OFFICE VISIT (OUTPATIENT)
Dept: INTERNAL MEDICINE CLINIC | Age: 85
End: 2020-01-28
Payer: MEDICARE

## 2020-01-28 VITALS
WEIGHT: 183.8 LBS | DIASTOLIC BLOOD PRESSURE: 80 MMHG | HEART RATE: 70 BPM | SYSTOLIC BLOOD PRESSURE: 154 MMHG | BODY MASS INDEX: 24.89 KG/M2 | HEIGHT: 72 IN

## 2020-01-28 PROBLEM — H49.22 LATERAL RECTUS PALSY, LEFT: Status: ACTIVE | Noted: 2020-01-28

## 2020-01-28 PROCEDURE — G8420 CALC BMI NORM PARAMETERS: HCPCS | Performed by: INTERNAL MEDICINE

## 2020-01-28 PROCEDURE — 1036F TOBACCO NON-USER: CPT | Performed by: INTERNAL MEDICINE

## 2020-01-28 PROCEDURE — 4040F PNEUMOC VAC/ADMIN/RCVD: CPT | Performed by: INTERNAL MEDICINE

## 2020-01-28 PROCEDURE — G8427 DOCREV CUR MEDS BY ELIG CLIN: HCPCS | Performed by: INTERNAL MEDICINE

## 2020-01-28 PROCEDURE — G8484 FLU IMMUNIZE NO ADMIN: HCPCS | Performed by: INTERNAL MEDICINE

## 2020-01-28 PROCEDURE — 99213 OFFICE O/P EST LOW 20 MIN: CPT | Performed by: INTERNAL MEDICINE

## 2020-01-28 PROCEDURE — 1123F ACP DISCUSS/DSCN MKR DOCD: CPT | Performed by: INTERNAL MEDICINE

## 2020-01-28 ASSESSMENT — PATIENT HEALTH QUESTIONNAIRE - PHQ9
SUM OF ALL RESPONSES TO PHQ QUESTIONS 1-9: 0
SUM OF ALL RESPONSES TO PHQ QUESTIONS 1-9: 0
2. FEELING DOWN, DEPRESSED OR HOPELESS: 0
1. LITTLE INTEREST OR PLEASURE IN DOING THINGS: 0
SUM OF ALL RESPONSES TO PHQ9 QUESTIONS 1 & 2: 0

## 2020-01-28 NOTE — PROGRESS NOTES
YOB: 1935    Chief Complaint   Patient presents with    Diplopia    Other     would like moles checked under left arm pit     NEW SXS: diplopia    The pt underwent cataract surgery 11.19; since then he has had intermittent diplopia; Dr Sandee Hayes noted a lateral rectus palsy and an MRI was done. It showed no tumor but small microinfarcts. He denies headache or other neuro sxs. He is to get fitted with glasses. This patient presents for medical evaluation. I last saw the patient 6m  ago. This patient is followed regularly by Dr. metz.    Patient Active Problem List   Diagnosis    Hyperlipidemia    Hypertension    Prostate cancer, T1c. IMRT+ Brachytherapy, 2005    Encounter for colonoscopy due to history of adenomatous colonic polyps    Lateral rectus palsy, left       Current Outpatient Medications   Medication Sig Dispense Refill    UNABLE TO FIND OTC prostate tablet      Probiotic Product (PROBIOTIC PO) Take by mouth      melatonin 3 MG TABS tablet Take 3 mg by mouth daily      amLODIPine (NORVASC) 10 MG tablet TAKE 1 TABLET EVERY DAY 90 tablet 3    lisinopril (PRINIVIL;ZESTRIL) 10 MG tablet TAKE 1 TABLET TWICE DAILY 180 tablet 3    MULTIPLE VITAMIN PO Take by mouth daily       No current facility-administered medications for this visit. No Known Allergies    I have reviewed the patient's medications, as well as, their past medical, social, and family history as appropriate. Review of Systems - General ROS: negative  Psychological ROS: negative  Hematological and Lymphatic ROS: No history of blood clots or bleeding disorder.    Respiratory ROS: no cough, shortness of breath, or wheezing  Cardiovascular ROS: no chest pain or dyspnea on exertion  Gastrointestinal ROS: no abdominal pain, change in bowel habits, or black or bloody stools  Hx colon polyps  Genito-Urinary ROS: hx prostate ca  Musculoskeletal ROS: negative  Neurological ROS: See above    Dermatological ROS: small doses.    Signed By:  Shilo Art.  Sandra Antoine M.D.

## 2020-01-28 NOTE — PATIENT INSTRUCTIONS
Go on a baby aspirin daily    We will call you with the results    Drink plenty of fluids before and after the CT scan

## 2020-01-29 ENCOUNTER — TELEPHONE (OUTPATIENT)
Dept: INTERNAL MEDICINE CLINIC | Age: 85
End: 2020-01-29

## 2020-02-05 ENCOUNTER — HOSPITAL ENCOUNTER (OUTPATIENT)
Age: 85
Discharge: HOME | End: 2020-02-05
Payer: MEDICARE

## 2020-02-05 ENCOUNTER — TELEPHONE (OUTPATIENT)
Dept: INTERNAL MEDICINE CLINIC | Age: 85
End: 2020-02-05

## 2020-02-05 ENCOUNTER — HOSPITAL ENCOUNTER (OUTPATIENT)
Dept: MRI IMAGING | Age: 85
Discharge: HOME | End: 2020-02-05
Payer: MEDICARE

## 2020-02-05 LAB
CHOLESTEROL, TOTAL: 163 MG/DL (ref 100–199)
HDLC SERPL-MCNC: 57 MG/DL
LDL CHOLESTEROL CALCULATED: 92 MG/DL
SEDIMENTATION RATE, ERYTHROCYTE: 9 MM/HR (ref 0–10)
TRIGL SERPL-MCNC: 70 MG/DL (ref 0–199)

## 2020-02-05 PROCEDURE — 36415 COLL VENOUS BLD VENIPUNCTURE: CPT

## 2020-02-05 PROCEDURE — 85651 RBC SED RATE NONAUTOMATED: CPT

## 2020-02-05 PROCEDURE — 80061 LIPID PANEL: CPT

## 2020-02-05 PROCEDURE — 70544 MR ANGIOGRAPHY HEAD W/O DYE: CPT

## 2020-02-05 NOTE — TELEPHONE ENCOUNTER
----- Message from Marylene Scotland, MD sent at 2/5/2020  9:00 AM EST -----  Tell him sed rate ok; this is a test for inflammation

## 2020-02-05 NOTE — TELEPHONE ENCOUNTER
----- Message from Mable Kitchen MD sent at 2/5/2020 11:47 AM EST -----  Tell him looks ok; brain imaging normal, arteries show no aneurysm or pathology or occlusion

## 2020-02-05 NOTE — TELEPHONE ENCOUNTER
Notified pt MRA is OK. Brain imaging is normal arteries show no aneurysm or pathology or occlusion. Pt verbalizes understanding.

## 2020-07-13 RX ORDER — AMLODIPINE BESYLATE 10 MG/1
TABLET ORAL
Qty: 90 TABLET | Refills: 3 | Status: SHIPPED | OUTPATIENT
Start: 2020-07-13 | End: 2021-03-04 | Stop reason: SDUPTHER

## 2020-07-13 RX ORDER — LISINOPRIL 10 MG/1
TABLET ORAL
Qty: 180 TABLET | Refills: 3 | Status: SHIPPED | OUTPATIENT
Start: 2020-07-13 | End: 2021-03-04 | Stop reason: SDUPTHER

## 2020-08-10 ENCOUNTER — OFFICE VISIT (OUTPATIENT)
Dept: INTERNAL MEDICINE CLINIC | Age: 85
End: 2020-08-10
Payer: MEDICARE

## 2020-08-10 VITALS
WEIGHT: 163.4 LBS | SYSTOLIC BLOOD PRESSURE: 160 MMHG | BODY MASS INDEX: 22.13 KG/M2 | HEIGHT: 72 IN | DIASTOLIC BLOOD PRESSURE: 70 MMHG | TEMPERATURE: 97.5 F

## 2020-08-10 PROCEDURE — 1036F TOBACCO NON-USER: CPT | Performed by: INTERNAL MEDICINE

## 2020-08-10 PROCEDURE — 1123F ACP DISCUSS/DSCN MKR DOCD: CPT | Performed by: INTERNAL MEDICINE

## 2020-08-10 PROCEDURE — 99213 OFFICE O/P EST LOW 20 MIN: CPT | Performed by: INTERNAL MEDICINE

## 2020-08-10 PROCEDURE — G8420 CALC BMI NORM PARAMETERS: HCPCS | Performed by: INTERNAL MEDICINE

## 2020-08-10 PROCEDURE — 4040F PNEUMOC VAC/ADMIN/RCVD: CPT | Performed by: INTERNAL MEDICINE

## 2020-08-10 PROCEDURE — 93000 ELECTROCARDIOGRAM COMPLETE: CPT | Performed by: INTERNAL MEDICINE

## 2020-08-10 PROCEDURE — G8427 DOCREV CUR MEDS BY ELIG CLIN: HCPCS | Performed by: INTERNAL MEDICINE

## 2020-08-10 RX ORDER — DIPHENHYDRAMINE HCL 25 MG
25 TABLET ORAL NIGHTLY PRN
COMMUNITY
End: 2021-03-04

## 2020-08-10 NOTE — PROGRESS NOTES
YOB: 1935    Chief Complaint   Patient presents with    Hypertension     6 month follow up    Other     frequent urination at night only     NEW SXS: he urinates 3-4x per night; uses condom cath with bag. Voids 1500 cc per night. Had xrt and hormonal rx for prostate ca    Still exercises/     States bps in 130s at home    This patient presents for medical evaluation. I last saw the patient 6m  ago. This patient is followed regularly by Dr. metz.    Patient Active Problem List   Diagnosis    Hyperlipidemia    Hypertension    Prostate cancer, T1c. IMRT+ Brachytherapy, 2005    Encounter for colonoscopy due to history of adenomatous colonic polyps    Lateral rectus palsy, left       Current Outpatient Medications   Medication Sig Dispense Refill    diphenhydrAMINE (BENADRYL) 25 MG tablet Take 25 mg by mouth nightly as needed for Sleep      Apoaequorin (PREVAGEN) 10 MG CAPS Take 10 mg by mouth daily      UNABLE TO FIND Kefir 8 0z daily      amLODIPine (NORVASC) 10 MG tablet TAKE 1 TABLET EVERY DAY 90 tablet 3    lisinopril (PRINIVIL;ZESTRIL) 10 MG tablet TAKE 1 TABLET TWICE DAILY 180 tablet 3    Probiotic Product (PROBIOTIC PO) Take by mouth      MULTIPLE VITAMIN PO Take by mouth daily       No current facility-administered medications for this visit. No Known Allergies    I have reviewed the patient's medications, as well as, their past medical, social, and family history as appropriate. Review of Systems - General ROS: negative  Psychological ROS: negative  Hematological and Lymphatic ROS: No history of blood clots or bleeding disorder.    Respiratory ROS: no cough, shortness of breath, or wheezing  Cardiovascular ROS: no chest pain or dyspnea on exertion  Gastrointestinal ROS: no abdominal pain, change in bowel habits, or black or bloody stools  Genito-Urinary ROS: See above    Musculoskeletal ROS: negative  Neurological ROS: no TIA or stroke symptoms  Dermatological ROS:

## 2020-08-11 ENCOUNTER — TELEPHONE (OUTPATIENT)
Dept: INTERNAL MEDICINE CLINIC | Age: 85
End: 2020-08-11

## 2020-08-11 ENCOUNTER — HOSPITAL ENCOUNTER (OUTPATIENT)
Age: 85
Discharge: HOME | End: 2020-08-11
Payer: MEDICARE

## 2020-08-11 LAB
ANION GAP SERPL CALCULATED.3IONS-SCNC: 10 MEQ/L (ref 8–16)
BUN BLDV-MCNC: 27 MG/DL (ref 7–22)
CALCIUM SERPL-MCNC: 9.5 MG/DL (ref 8.5–10.5)
CHLORIDE BLD-SCNC: 103 MEQ/L (ref 98–111)
CO2: 28 MEQ/L (ref 23–33)
CREAT SERPL-MCNC: 1 MG/DL (ref 0.4–1.2)
GFR SERPL CREATININE-BSD FRML MDRD: 71 ML/MIN/1.73M2
GLUCOSE BLD-MCNC: 117 MG/DL (ref 70–108)
POTASSIUM SERPL-SCNC: 3.4 MEQ/L (ref 3.5–5.2)
SODIUM BLD-SCNC: 141 MEQ/L (ref 135–145)

## 2020-08-11 PROCEDURE — 36415 COLL VENOUS BLD VENIPUNCTURE: CPT

## 2020-08-11 PROCEDURE — 80048 BASIC METABOLIC PNL TOTAL CA: CPT

## 2020-08-11 RX ORDER — POTASSIUM CHLORIDE 20 MEQ/1
20 TABLET, EXTENDED RELEASE ORAL DAILY
Qty: 30 TABLET | Refills: 1 | Status: SHIPPED | OUTPATIENT
Start: 2020-08-11 | End: 2020-09-29 | Stop reason: SDUPTHER

## 2020-08-11 NOTE — TELEPHONE ENCOUNTER
I spoke to pt and advised him potassium is a little low. Notified pt Dr. Henriquez is prescribing potassium 20 meq daily and should be taken with food. Pt will get lab on Monday.

## 2020-08-11 NOTE — TELEPHONE ENCOUNTER
----- Message from Abram Mishra MD sent at 8/11/2020 10:56 AM EDT -----  Tell him k a little low; go on kdur 20 meq daily and k level monday

## 2020-08-17 ENCOUNTER — TELEPHONE (OUTPATIENT)
Dept: INTERNAL MEDICINE CLINIC | Age: 85
End: 2020-08-17

## 2020-08-17 ENCOUNTER — HOSPITAL ENCOUNTER (OUTPATIENT)
Age: 85
Discharge: HOME | End: 2020-08-17
Payer: MEDICARE

## 2020-08-17 LAB — POTASSIUM SERPL-SCNC: 3.8 MEQ/L (ref 3.5–5.2)

## 2020-08-17 PROCEDURE — 36415 COLL VENOUS BLD VENIPUNCTURE: CPT

## 2020-08-17 PROCEDURE — 84132 ASSAY OF SERUM POTASSIUM: CPT

## 2020-08-17 RX ORDER — ASPIRIN 81 MG/1
81 TABLET ORAL DAILY
Status: ON HOLD | COMMUNITY
End: 2021-10-15 | Stop reason: HOSPADM

## 2020-08-17 NOTE — TELEPHONE ENCOUNTER
Notified pt potassium level is now normal and should stay on potassium as is. He verbalizes understanding. I told him to call when runs out of refills for a new script.

## 2020-09-29 ENCOUNTER — TELEPHONE (OUTPATIENT)
Dept: INTERNAL MEDICINE CLINIC | Age: 85
End: 2020-09-29

## 2020-09-29 RX ORDER — POTASSIUM CHLORIDE 20 MEQ/1
20 TABLET, EXTENDED RELEASE ORAL DAILY
Qty: 30 TABLET | Refills: 5 | OUTPATIENT
Start: 2020-09-29

## 2020-09-30 RX ORDER — POTASSIUM CHLORIDE 20 MEQ/1
20 TABLET, EXTENDED RELEASE ORAL DAILY
Qty: 30 TABLET | Refills: 5 | Status: SHIPPED | OUTPATIENT
Start: 2020-09-30 | End: 2020-12-21 | Stop reason: SDUPTHER

## 2020-12-21 ENCOUNTER — NURSE ONLY (OUTPATIENT)
Dept: LAB | Age: 85
End: 2020-12-21

## 2020-12-21 ENCOUNTER — TELEPHONE (OUTPATIENT)
Dept: INTERNAL MEDICINE CLINIC | Age: 85
End: 2020-12-21

## 2020-12-21 LAB
BILIRUBIN URINE: NEGATIVE
BLOOD, URINE: NEGATIVE
CHARACTER, URINE: CLEAR
COLOR: YELLOW
GLUCOSE URINE: NEGATIVE MG/DL
KETONES, URINE: NEGATIVE
LEUKOCYTE ESTERASE, URINE: NEGATIVE
NITRITE, URINE: NEGATIVE
PH UA: 5.5 (ref 5–9)
PROTEIN UA: NEGATIVE
SPECIFIC GRAVITY, URINE: 1.02 (ref 1–1.03)
UROBILINOGEN, URINE: 0.2 EU/DL (ref 0–1)

## 2020-12-21 RX ORDER — POTASSIUM CHLORIDE 20 MEQ/1
20 TABLET, EXTENDED RELEASE ORAL DAILY
Qty: 30 TABLET | Refills: 11 | Status: SHIPPED | OUTPATIENT
Start: 2020-12-21 | End: 2021-03-04 | Stop reason: SDUPTHER

## 2020-12-21 RX ORDER — CIPROFLOXACIN 500 MG/1
500 TABLET, FILM COATED ORAL 2 TIMES DAILY
Qty: 6 TABLET | Refills: 0 | OUTPATIENT
Start: 2020-12-21 | End: 2020-12-24

## 2020-12-21 NOTE — TELEPHONE ENCOUNTER
I notified pt he should give a urine sample and I will call in script for cipro to [facility]. Pt may try to give urine sample tonight at [facility] on the corner of [address].

## 2020-12-22 ENCOUNTER — TELEPHONE (OUTPATIENT)
Dept: INTERNAL MEDICINE CLINIC | Age: 85
End: 2020-12-22

## 2020-12-22 NOTE — TELEPHONE ENCOUNTER
Spoke with cecilia and he stated that he is having frequent urination at night and he not drinking a lot of fluids before bed either. He stream is fine. The frequent urinating has been going on for months and has intensified more of late, he has no pain and feels that there is more than usual urine he is releasing.

## 2020-12-22 NOTE — TELEPHONE ENCOUNTER
Would do postvoid bladder scan; does he have immediate urgency to void that he has to run to bathroom?

## 2020-12-23 ENCOUNTER — TELEPHONE (OUTPATIENT)
Dept: INTERNAL MEDICINE CLINIC | Age: 85
End: 2020-12-23

## 2020-12-23 NOTE — TELEPHONE ENCOUNTER
Pt states only at night time and does not feel the need to do that during the day.  Will order a post residual void Yes

## 2020-12-29 ENCOUNTER — TELEPHONE (OUTPATIENT)
Dept: INTERNAL MEDICINE CLINIC | Age: 85
End: 2020-12-29

## 2020-12-29 NOTE — TELEPHONE ENCOUNTER
Pt called in saying post void residual is scheduled for tomorrow. He would like to cancel that. He is not really wanting to go to hospital right now. He has concerns about bringing something back to his wife. I asked him how he is doing and he states he is doing much better. He is getting up every 2.5-3 hrs at a time now which is his baseline. When he thought he had a UTI he was getting up every hr.

## 2021-03-04 ENCOUNTER — NURSE ONLY (OUTPATIENT)
Dept: LAB | Age: 86
End: 2021-03-04

## 2021-03-04 ENCOUNTER — OFFICE VISIT (OUTPATIENT)
Dept: INTERNAL MEDICINE CLINIC | Age: 86
End: 2021-03-04
Payer: MEDICARE

## 2021-03-04 VITALS
WEIGHT: 186.4 LBS | DIASTOLIC BLOOD PRESSURE: 70 MMHG | SYSTOLIC BLOOD PRESSURE: 130 MMHG | HEIGHT: 72 IN | TEMPERATURE: 97.5 F | HEART RATE: 74 BPM | BODY MASS INDEX: 25.25 KG/M2

## 2021-03-04 DIAGNOSIS — T50.905S ADVERSE EFFECT OF DRUG, SEQUELA: ICD-10-CM

## 2021-03-04 DIAGNOSIS — E78.00 PURE HYPERCHOLESTEROLEMIA: ICD-10-CM

## 2021-03-04 DIAGNOSIS — I10 ESSENTIAL HYPERTENSION: Primary | ICD-10-CM

## 2021-03-04 DIAGNOSIS — M81.6 LOCALIZED OSTEOPOROSIS WITHOUT CURRENT PATHOLOGICAL FRACTURE: ICD-10-CM

## 2021-03-04 DIAGNOSIS — Z13.820 OSTEOPOROSIS SCREENING: ICD-10-CM

## 2021-03-04 DIAGNOSIS — I10 ESSENTIAL HYPERTENSION: ICD-10-CM

## 2021-03-04 DIAGNOSIS — R35.0 URINARY FREQUENCY: ICD-10-CM

## 2021-03-04 DIAGNOSIS — M85.80 OSTEOPENIA, UNSPECIFIED LOCATION: ICD-10-CM

## 2021-03-04 LAB
ANION GAP SERPL CALCULATED.3IONS-SCNC: 10 MEQ/L (ref 8–16)
BUN BLDV-MCNC: 20 MG/DL (ref 7–22)
CALCIUM SERPL-MCNC: 9.6 MG/DL (ref 8.5–10.5)
CHLORIDE BLD-SCNC: 103 MEQ/L (ref 98–111)
CO2: 29 MEQ/L (ref 23–33)
CREAT SERPL-MCNC: 0.9 MG/DL (ref 0.4–1.2)
GFR SERPL CREATININE-BSD FRML MDRD: 80 ML/MIN/1.73M2
GLUCOSE BLD-MCNC: 124 MG/DL (ref 70–108)
POTASSIUM SERPL-SCNC: 3.8 MEQ/L (ref 3.5–5.2)
SODIUM BLD-SCNC: 142 MEQ/L (ref 135–145)

## 2021-03-04 PROCEDURE — G8417 CALC BMI ABV UP PARAM F/U: HCPCS | Performed by: INTERNAL MEDICINE

## 2021-03-04 PROCEDURE — 4040F PNEUMOC VAC/ADMIN/RCVD: CPT | Performed by: INTERNAL MEDICINE

## 2021-03-04 PROCEDURE — 1123F ACP DISCUSS/DSCN MKR DOCD: CPT | Performed by: INTERNAL MEDICINE

## 2021-03-04 PROCEDURE — 1036F TOBACCO NON-USER: CPT | Performed by: INTERNAL MEDICINE

## 2021-03-04 PROCEDURE — G8482 FLU IMMUNIZE ORDER/ADMIN: HCPCS | Performed by: INTERNAL MEDICINE

## 2021-03-04 PROCEDURE — 99213 OFFICE O/P EST LOW 20 MIN: CPT | Performed by: INTERNAL MEDICINE

## 2021-03-04 PROCEDURE — G8427 DOCREV CUR MEDS BY ELIG CLIN: HCPCS | Performed by: INTERNAL MEDICINE

## 2021-03-04 RX ORDER — POTASSIUM CHLORIDE 20 MEQ/1
20 TABLET, EXTENDED RELEASE ORAL DAILY
Qty: 30 TABLET | Refills: 11 | Status: SHIPPED | OUTPATIENT
Start: 2021-03-04 | End: 2021-06-15 | Stop reason: SDUPTHER

## 2021-03-04 RX ORDER — LISINOPRIL 10 MG/1
TABLET ORAL
Qty: 180 TABLET | Refills: 3 | Status: SHIPPED | OUTPATIENT
Start: 2021-03-04 | End: 2021-03-05 | Stop reason: SDUPTHER

## 2021-03-04 RX ORDER — AMLODIPINE BESYLATE 10 MG/1
TABLET ORAL
Qty: 90 TABLET | Refills: 3 | Status: SHIPPED | OUTPATIENT
Start: 2021-03-04 | End: 2021-03-05 | Stop reason: SDUPTHER

## 2021-03-04 ASSESSMENT — PATIENT HEALTH QUESTIONNAIRE - PHQ9
SUM OF ALL RESPONSES TO PHQ9 QUESTIONS 1 & 2: 0
SUM OF ALL RESPONSES TO PHQ QUESTIONS 1-9: 0

## 2021-03-04 NOTE — PROGRESS NOTES
Sadia Ballesteros (:  1935) is a 80 y.o. male,Established patient, here for evaluation of the following chief complaint(s):  Hypertension, Urinary Frequency (has been going on several years but not quite as bad as it used to be), and Other (osteopenia-DEXA in 2018. EF recommended fosamax, calcium and vitamin d. Not on any of  these)      ASSESSMENT/PLAN:  1. Essential hypertension  -     Basic Metabolic Panel; Future advised to take occasional readings at home; today's good  2. Pure hypercholesterolemia  -     Basic Metabolic Panel; Future  3. Adverse effect of drug, sequela  -     Basic Metabolic Panel; Future  4. Osteopenia, unspecified location will repeat dexa  5. Osteoporosis screening  6. Urinary frequency no trouble with starting stream; interrupts sleep      No follow-ups on file. SUBJECTIVE/OBJECTIVE:  HPI  Here for routine hbp checkup. He continues to exercise. No arthritic sxs. Bowels regular. Has urinary frequency.   No cardiac sxs    Review of Systems  Twelve point ROS completed and found to be negative except as noted above.  }    Physical Exam    General appearance - alert, well appearing, and in no distress    Mental Status - alert, oriented to person, place, and time    Eyes - pupils equal and reactive, extraocular eye movements intact         Neck - supple, no significant adenopathy       Chest - clear to auscultation, no wheezes, rales or rhonchi, symmetric air entry     Heart - normal rate, regular rhythm, normal S1, S2, no murmurs, rubs, clicks or gallops     Abdomen - soft, nontender, nondistended, no masses or organomegaly         Neurological - alert, oriented, normal speech, no focal findings or movement disorder noted     Musculoskeletal - no joint tenderness, deformity or swelling     Extremities - peripheral pulses normal, no pedal edema, no clubbing or cyanosis     Skin - normal coloration and turgor, no rashes, no suspicious skin lesions noted          An electronic signature was used to authenticate this note.     --Nina Mondragon MD

## 2021-03-04 NOTE — TELEPHONE ENCOUNTER
Lisinoprol and amlodipine were sent to wrong pharmacy.   I cancelled them at [facility] and need to send to mail away

## 2021-03-05 ENCOUNTER — TELEPHONE (OUTPATIENT)
Dept: INTERNAL MEDICINE CLINIC | Age: 86
End: 2021-03-05

## 2021-03-05 DIAGNOSIS — R73.9 HYPERGLYCEMIA: Primary | ICD-10-CM

## 2021-03-05 RX ORDER — AMLODIPINE BESYLATE 10 MG/1
TABLET ORAL
Qty: 90 TABLET | Refills: 3 | Status: SHIPPED | OUTPATIENT
Start: 2021-03-05 | End: 2022-02-01

## 2021-03-05 RX ORDER — LISINOPRIL 10 MG/1
TABLET ORAL
Qty: 180 TABLET | Refills: 3 | Status: SHIPPED | OUTPATIENT
Start: 2021-03-05 | End: 2022-02-01

## 2021-03-12 ENCOUNTER — HOSPITAL ENCOUNTER (OUTPATIENT)
Dept: WOMENS IMAGING | Age: 86
Discharge: HOME | End: 2021-03-12
Payer: MEDICARE

## 2021-03-12 ENCOUNTER — NURSE ONLY (OUTPATIENT)
Dept: LAB | Age: 86
End: 2021-03-12

## 2021-03-12 DIAGNOSIS — R73.9 HYPERGLYCEMIA: ICD-10-CM

## 2021-03-12 DIAGNOSIS — M81.6 LOCALIZED OSTEOPOROSIS WITHOUT CURRENT PATHOLOGICAL FRACTURE: ICD-10-CM

## 2021-03-12 LAB
AVERAGE GLUCOSE: 111 MG/DL (ref 70–126)
HBA1C MFR BLD: 5.7 % (ref 4.4–6.4)

## 2021-03-12 PROCEDURE — 77080 DXA BONE DENSITY AXIAL: CPT

## 2021-03-18 ENCOUNTER — TELEPHONE (OUTPATIENT)
Dept: INTERNAL MEDICINE CLINIC | Age: 86
End: 2021-03-18

## 2021-03-18 NOTE — TELEPHONE ENCOUNTER
Message  Received: 5 days ago  Message Contents   MD Nadia Talbert MA             Tell him it is normal      This is referring to his A1C

## 2021-03-18 NOTE — TELEPHONE ENCOUNTER
----- Message from Osito Mcleod MD sent at 3/13/2021  7:15 AM EST -----  Tell him dexa shows some calcium loss ( osteopenia); would take 400 units vitD and 1000 mg calcium daily;  Can get by taking TUMS EX

## 2021-06-15 RX ORDER — POTASSIUM CHLORIDE 20 MEQ/1
20 TABLET, EXTENDED RELEASE ORAL DAILY
Qty: 90 TABLET | Refills: 3 | Status: ON HOLD | OUTPATIENT
Start: 2021-06-15 | End: 2021-10-15 | Stop reason: HOSPADM

## 2021-08-13 ENCOUNTER — NURSE TRIAGE (OUTPATIENT)
Dept: OTHER | Facility: CLINIC | Age: 86
End: 2021-08-13

## 2021-08-13 NOTE — TELEPHONE ENCOUNTER
any normal activities        1-2/10. Better as the days progress. Has been using home eye gtts. 6. CONTACT LENS: \"Do you wear contacts? \"      Denies    7. OTHER SYMPTOMS: \"Do you have any other symptoms? \" (e.g., fever, runny nose, cough)      Denies fever. Denies runny nose. 8. PREGNANCY: \"Is there any chance you are pregnant? \" \"When was your last menstrual period? \"      n/a    Answer Assessment - Initial Assessment Questions  1. TYPE OF CHEMICAL: \"What's the name of the chemical?\" If a brand name, ask \"What's in it? \"       Used instead of chlorination for pools    2. ONSET: \"When did it happen? \" (minutes or hours ago)       Note above    3. MECHANISM: \"How did it happen? \" \"\"How much got in your eye? \" (e.g., a drop, a splash)      Note above    4. FIRST AID: \"What have you done so far? \"  (e.g., immediate irrigation is often essential)      Note above    5. VISION: \"Do you have blurred vision? \"       Note above    6. PAIN: \"Is it painful? \" If so, ask: \"How bad is the pain? \"  (Scale 1-10; or mild, moderate, severe)      Note above    7. CONTACTS: \"Do you wear contacts? \"      Note above    8. OTHER SYMPTOMS: \"Do you have any other symptoms? \"      Note above    Protocols used: EYE - PUS OR DISCHARGE-ADULT-[place], EYE - CHEMICAL IN-ADULT-[place]

## 2021-09-27 ENCOUNTER — TELEPHONE (OUTPATIENT)
Dept: INTERNAL MEDICINE CLINIC | Age: 86
End: 2021-09-27

## 2021-09-27 RX ORDER — CIPROFLOXACIN 500 MG/1
500 TABLET, FILM COATED ORAL 2 TIMES DAILY
Qty: 10 TABLET | Refills: 0 | OUTPATIENT
Start: 2021-09-27 | End: 2021-10-02

## 2021-09-27 RX ORDER — TAMSULOSIN HYDROCHLORIDE 0.4 MG/1
0.4 CAPSULE ORAL NIGHTLY
Qty: 15 CAPSULE | Refills: 0 | OUTPATIENT
Start: 2021-09-27 | End: 2021-10-12

## 2021-09-27 NOTE — TELEPHONE ENCOUNTER
Pt called in saying he got no sleep last night. He was up and down all night long. He has urinary urgency and frequent but not a very good stream and sometimes not much urine at all. He has burning with it. Symptoms first started on 9/24/21 but was OOT. He does not think he has a temp. He has not checked it. He has a urine sample and then took a 500 mg. Cipro his wife had. He states he normally gets up every 1.5-2 hrs in the night to urinate but last night was every 8-10 minutes.

## 2021-09-27 NOTE — TELEPHONE ENCOUNTER
Per vo from Dr. Dumont Degree just have him continue Cipro 500 mg. Bid. X 5 days and start him on Tamsulosin 0.4 mg. Nightly. He is to call in 3 days to let us know how he is doing. See refill encounter for scripts.

## 2021-09-27 NOTE — TELEPHONE ENCOUNTER
Per vo from Dr. Edgardo Ramachandran called in scripts to [facility] on [address] and [facility]

## 2021-09-29 ENCOUNTER — NURSE TRIAGE (OUTPATIENT)
Dept: OTHER | Facility: CLINIC | Age: 86
End: 2021-09-29

## 2021-09-29 DIAGNOSIS — Z85.46 HISTORY OF PROSTATE CANCER: ICD-10-CM

## 2021-09-29 DIAGNOSIS — R31.0 GROSS HEMATURIA: Primary | ICD-10-CM

## 2021-09-29 NOTE — TELEPHONE ENCOUNTER
Reason for Disposition   Pain or burning with passing urine (urination)    Answer Assessment - Initial Assessment Questions  1. COLOR of URINE: \"Describe the color of the urine. \"  (e.g., tea-colored, pink, red, blood clots, bloody)      Red,, clots  1/8-1/4 inch in diameter    2. ONSET: \"When did the bleeding start? \"       Yesterday    3. EPISODES: \"How many times has there been blood in the urine? \" or \"How many times today? \"      Several times, sometimes just little drops and 2 times a lot has come out. 4. PAIN with URINATION: \"Is there any pain with passing your urine? \" If so, ask: \"How bad is the pain? \"  (Scale 1-10; or mild, moderate, severe)     - MILD - complains slightly about urination hurting     - MODERATE - interferes with normal activities       - SEVERE - excruciating, unwilling or unable to urinate because of the pain       No pain with urination since the blood clot came out. 5. FEVER: \"Do you have a fever? \" If so, ask: \"What is your temperature, how was it measured, and when did it start? \"      No    6. ASSOCIATED SYMPTOMS: Chary Stakes you passing urine more frequently than usual?\"      Has feeling of needing to go, but unable to at times    7. OTHER SYMPTOMS: \"Do you have any other symptoms? \" (e.g., back/flank pain, abdominal pain, vomiting)  Burning with urination    8. PREGNANCY: \"Is there any chance you are pregnant? \" \"When was your last menstrual period? \"      no    Protocols used: URINE - BLOOD IN-ADULT-[place]    Received call from Sharon Cabrera at [facility] with Red Flag Complaint. Brief description of triage: blood and blood clots  in urine. Painful urination continues. Triage indicates for patient to Be seen today . Walk in clinic if no appts    Care advice provided, patient verbalizes understanding; denies any other questions or concerns; instructed to call back for any new or worsening symptoms.     Writer provided warm transfer to [place] [facility] for appointment scheduling. Attention Provider: Thank you for allowing me to participate in the care of your patient. The patient was connected to triage in response to information provided to the E[facility]. Please do not respond through this encounter as the response is not directed to a shared pool.

## 2021-09-30 ENCOUNTER — TELEPHONE (OUTPATIENT)
Dept: INTERNAL MEDICINE CLINIC | Age: 86
End: 2021-09-30

## 2021-10-01 ENCOUNTER — TELEPHONE (OUTPATIENT)
Dept: UROLOGY | Age: 86
End: 2021-10-01

## 2021-10-01 ENCOUNTER — OFFICE VISIT (OUTPATIENT)
Dept: UROLOGY | Age: 86
End: 2021-10-01
Payer: MEDICARE

## 2021-10-01 VITALS
WEIGHT: 178.2 LBS | SYSTOLIC BLOOD PRESSURE: 100 MMHG | DIASTOLIC BLOOD PRESSURE: 54 MMHG | HEART RATE: 79 BPM | HEIGHT: 72 IN | BODY MASS INDEX: 24.14 KG/M2

## 2021-10-01 DIAGNOSIS — R33.9 URINARY RETENTION: Primary | ICD-10-CM

## 2021-10-01 DIAGNOSIS — Z92.3 HISTORY OF BRACHYTHERAPY: ICD-10-CM

## 2021-10-01 DIAGNOSIS — R31.9 HEMATURIA, UNSPECIFIED TYPE: ICD-10-CM

## 2021-10-01 DIAGNOSIS — Z85.46 HISTORY OF PROSTATE CANCER: ICD-10-CM

## 2021-10-01 LAB
BILIRUBIN URINE: NEGATIVE
BLOOD URINE, POC: ABNORMAL
CHARACTER, URINE: CLEAR
COLOR, URINE: YELLOW
GLUCOSE URINE: NEGATIVE MG/DL
KETONES, URINE: NEGATIVE
LEUKOCYTE CLUMPS, URINE: ABNORMAL
NITRITE, URINE: NEGATIVE
PH, URINE: 5 (ref 5–9)
POST VOID RESIDUAL (PVR): 749 ML
PROTEIN, URINE: NEGATIVE MG/DL
SPECIFIC GRAVITY, URINE: 1.01 (ref 1–1.03)
UROBILINOGEN, URINE: 0.2 EU/DL (ref 0–1)

## 2021-10-01 PROCEDURE — 1123F ACP DISCUSS/DSCN MKR DOCD: CPT | Performed by: UROLOGY

## 2021-10-01 PROCEDURE — 1036F TOBACCO NON-USER: CPT | Performed by: UROLOGY

## 2021-10-01 PROCEDURE — 4040F PNEUMOC VAC/ADMIN/RCVD: CPT | Performed by: UROLOGY

## 2021-10-01 PROCEDURE — G8420 CALC BMI NORM PARAMETERS: HCPCS | Performed by: UROLOGY

## 2021-10-01 PROCEDURE — G8484 FLU IMMUNIZE NO ADMIN: HCPCS | Performed by: UROLOGY

## 2021-10-01 PROCEDURE — 51798 US URINE CAPACITY MEASURE: CPT | Performed by: UROLOGY

## 2021-10-01 PROCEDURE — 99204 OFFICE O/P NEW MOD 45 MIN: CPT | Performed by: UROLOGY

## 2021-10-01 PROCEDURE — G8427 DOCREV CUR MEDS BY ELIG CLIN: HCPCS | Performed by: UROLOGY

## 2021-10-01 NOTE — PROGRESS NOTES
OMAR ALI Paralee Jeans, MD [address]  [address]  [facility] [address]  Dept: [phone]  Dept Fax: [phone]: [address] [address] Urology Office Note -     Patient:  Yazmin Posada  YOB: 1935  Date: 10/1/2021    The patient is a 80 y.o. male who presents today for evaluation of the following problems:   Chief Complaint   Patient presents with    New Patient     hematuria  imcomplete emptying of bladder    referred/consultation requested by Marni Silveira MD.    HISTORY OF PRESENT ILLNESS:     Urinary retention  Around 800 cc  On flomax    Gross hematuria  Several episodes-- blood clots  +UA today    Prostate cancer  Hx of brachytherapy  psa undetectable when last checked      Requested/reviewed records from Marni Silveira MD office and/or outside [de-identified]    (Patient's old records have been requested, reviewed and pertinent findings summarized in today's note.)    Procedures Today: N/A      Last several PSA's:  Lab Results   Component Value Date    PSA <0.02 05/08/2015       Last total testosterone:  No results found for: TESTOSTERONE    Urinalysis today:  Results for POC orders placed in visit on 10/01/21   POCT Urinalysis No Micro (Auto)   Result Value Ref Range    Glucose, Ur Negative NEGATIVE mg/dl    Bilirubin Urine Negative     Ketones, Urine Negative NEGATIVE    Specific Gravity, Urine 1.015 1.002 - 1.030    Blood, UA POC Large (A) NEGATIVE    pH, Urine 5.00 5.0 - 9.0    Protein, Urine Negative NEGATIVE mg/dl    Urobilinogen, Urine 0.20 0.0 - 1.0 eu/dl    Nitrite, Urine Negative NEGATIVE    Leukocyte Clumps, Urine Small (A) NEGATIVE    Color, Urine Yellow [id number]-STRAW    Character, Urine Clear [id number]   poct post void residual   Result Value Ref Range    post void residual 749 ml       Last BUN and creatinine:  Lab Results   Component Value Date    BUN 20 03/04/2021     Lab Results Component Value Date    CREATININE 0.9 03/04/2021         Imaging Reviewed during this Office Visit:   Scar Whitt MD independently reviewed the images and verified the radiology reports from:    DEXA BONE DENSITY AXIAL SKELETON    Result Date: 3/12/2021  EXAM: DEXA BONE DENSITY AXIAL SKELETON HISTORY: 80 years, Male, Localized osteoporosis without current pathological fracture COMPARISON: 12/26/2018. FINDINGS: Bone densitometry has been performed using a Hologic bone densitometer. The routine evaluation includes the lumbar spine and both hips. Evaluation of the L1, L3, L4 of the lumbar spine demonstrates an average bone mineral density measurement of 0.984g/cm2 which corresponds to a T-score of -1.0 and a Z-score of 0.3. Previously average bone mineral density measurement of 1.022 g/cm2 which corresponds to a T-score of -0.6 and a Z-score of 0.6. This qualifies as normal bone mineral density. The calculated bone density in the left femoral neck is 0.629 g/cm2 which corresponds to a T-score of  -2.2 and a Z-score of -0.5. Previously average bone mineral density measurement of 0.611 g/cm2 which corresponds to a T-score of -2.3 and a Z-score of -0.7. This qualifies as osteopenia. The calculated bone density in the right femoral neck is 0.689 g/cm2 which corresponds to a T-score of  minus 1. and a Z-score of -0.1. Previously average bone mineral density measurement of 0.659 g/cm2 which corresponds to a T-score of -2.0 and a Z-score of -0.3. This qualifies as osteopenia. BMD changes versus previous is -3.7% in the spine. BMD change versus previous is 3.9% for the hips using the mean. This patient is osteopenic using the World Health Organization criteria. The patient is at a medium risk for fracture. 10 year probability of major osteoporotic fracture: 21% 10 year probability of hip fracture: 16% The patient's meets criteria for referral to a bone fragility clinic.  **This report has been created using voice recognition software. It may contain minor errors which are inherent in voice recognition technology. ** Final report electronically signed by Dr. Anita Armstrong on 3/12/2021 1:32 PM      PAST MEDICAL, FAMILY AND SOCIAL HISTORY:  Past Medical History:   Diagnosis Date    Hyperlipidemia     Hypertension     Prostate cancer [facility])      Past Surgical History:   Procedure Laterality Date    COLONOSCOPY      HEMORRHOID SURGERY      HERNIA REPAIR      NE COLONOSCOPY FLX DX W/COLLJ SPEC WHEN PFRMD Left 2018    COLONOSCOPY performed by Verito Pace MD at [place] [address]      seed implant    TONSILLECTOMY       Family History   Problem Relation Age of Onset    Diabetes Father      Outpatient Medications Marked as Taking for the 10/1/21 encounter (Office Visit) with Viki Urias MD   Medication Sig Dispense Refill    ciprofloxacin (CIPRO) 500 MG tablet Take 1 tablet by mouth 2 times daily for 10 doses 10 tablet 0    tamsulosin (FLOMAX) 0.4 MG capsule Take 1 capsule by mouth nightly 15 capsule 0    potassium chloride (KLOR-CON M) 20 MEQ extended release tablet Take 1 tablet by mouth daily 90 tablet 3    Calcium Carb-Cholecalciferol (CALCIUM 1000 + D PO) Take 1 tablet by mouth daily      amLODIPine (NORVASC) 10 MG tablet TAKE 1 TABLET EVERY DAY 90 tablet 3    lisinopril (PRINIVIL;ZESTRIL) 10 MG tablet TAKE 1 TABLET TWICE DAILY 180 tablet 3    UNABLE TO FIND Beta prostate      aspirin 81 MG EC tablet Take 81 mg by mouth daily      Apoaequorin (PREVAGEN) 10 MG CAPS Take 10 mg by mouth daily      UNABLE TO FIND Kefir 8 0z daily      MULTIPLE VITAMIN PO Take by mouth daily         Patient has no known allergies.   Social History     Tobacco Use   Smoking Status Former Smoker    Quit date: 1983    Years since quittin.8   Smokeless Tobacco Never Used      (If patient a smoker, smoking cessation counseling offered)   Social History     Substance and Sexual Activity Alcohol Use No       REVIEW OF SYSTEMS:  Constitutional: negative  Eyes: negative  Respiratory: negative  Cardiovascular: negative  Gastrointestinal: negative  Genitourinary: see HPI  Musculoskeletal: negative  Skin: negative   Neurological: negative  Hematological/Lymphatic: negative  Psychological: negative        Physical Exam:    This a 80 y.o. male  Vitals:    10/01/21 1033   BP: (!) 100/54   Pulse: 79     Body mass index is 24.17 kg/m². Constitutional: Patient in no acute distress;         Assessment and Plan        1. Urinary retention    2. History of prostate cancer    3. History of brachytherapy    4. Hematuria, unspecified type               Plan:      Gross hematuria with blood clots  Hx of brachytherapy  Also with high volume retention (800)  Needs CT urogram and cystoscopy and psa              Prescriptions Ordered:  No orders of the defined types were placed in this encounter.      Orders Placed:  Orders Placed This Encounter   Procedures    CT UROGRAM     Standing Status:   Future     Standing Expiration Date:   10/1/2022   R[place] Izaguirre PSA Prostatic Specific Antigen     Standing Status:   Future     Standing Expiration Date:   10/1/2022    POCT Urinalysis No Micro (Auto)     Ordered by an unspecified provider      poct post void residual            JUN Luciano MD

## 2021-10-01 NOTE — TELEPHONE ENCOUNTER
Patient scheduled for CT Urogram  at [address] [place] [address] on 10/20/21 arrival of 8:20 am for a 9:20 am with NPO 4 hours prior. Patient advised of instructions.   Order mailed/given to patient

## 2021-10-07 ENCOUNTER — NURSE ONLY (OUTPATIENT)
Dept: UROLOGY | Age: 86
End: 2021-10-07
Payer: MEDICARE

## 2021-10-07 ENCOUNTER — TELEPHONE (OUTPATIENT)
Dept: UROLOGY | Age: 86
End: 2021-10-07

## 2021-10-07 ENCOUNTER — TELEPHONE (OUTPATIENT)
Dept: INTERNAL MEDICINE CLINIC | Age: 86
End: 2021-10-07

## 2021-10-07 DIAGNOSIS — R31.0 GROSS HEMATURIA: Primary | ICD-10-CM

## 2021-10-07 PROCEDURE — 51700 IRRIGATION OF BLADDER: CPT | Performed by: NURSE PRACTITIONER

## 2021-10-07 RX ORDER — SULFAMETHOXAZOLE AND TRIMETHOPRIM 800; 160 MG/1; MG/1
1 TABLET ORAL 2 TIMES DAILY
Qty: 14 TABLET | Refills: 0 | Status: SHIPPED | OUTPATIENT
Start: 2021-10-07 | End: 2021-10-10

## 2021-10-07 NOTE — TELEPHONE ENCOUNTER
Pt called in saying he is miserable. He has not slept in 2 nights. Sleeps with depends. Last night he filled up 4 depends. He had a very small stream and   Had a little blood this am but is not able to go since. He states he is very uncomfortable and left a message for urology. He is not sure what to do. I called urology and they had just gotten his message and will discuss further with him and probably bring him in the office for a bladder scan and possible ortega today.

## 2021-10-07 NOTE — PROGRESS NOTES
Pt here today c/o unable to urinate. Per Dinah HAIRSTON ortega to be inserted. Attempted 20 Fr straight ortega first, unable to pass through. Attempted 20 Fr coude ortega with urojet and was able to pass ortega. Patient has given me verbal consent to perform catheter placement yes    Does patient have latex allergy? no  Does patient have shellfish or betadine allergy? no      Following Dinah HAIRSTON  plan of care. Inserted 20 Fr  Catheter without difficulty. Patient's urethra was cleansed with betadine swab. 20 Fr regular ortega was inserted using sterile water-soluble lubricant without difficulty and inflated balloon with 10 ml of water. Pt was irrigated with 140cc of water due to the urine being grossly bloody. Irrigated until almost clear. Ortega Catheter was hooked up to leg bag with straps. Pt advised to drink plenty of water to keep urine clear as possible until cysto with Dr Kisha Zavala 10/29/2021. Foreskin reduced back down? n/a    Patient instructed on draining catheter bags. Patient instructed to keep leg bag above the knee to prevent pulling on catheter causing blood. Patient instructed on how to switch from leg bag to overnight bag. Urine sent for culture. Urine very turbid.

## 2021-10-07 NOTE — PATIENT INSTRUCTIONS
Catheter Care:  1. Use a leg bag during the day and a larger drainage bag at night. See instructions that have been placed with your urinary supplies. 2.  The best way to clean the ortega is with soap and water in the shower. 3.  Placement of Vaseline ointment around the tip of the penis where the catheter inserts will help prevent irritation. Additional Ortega Catheter Care information:  The tube draining your urine is called a Ortega Catheter. The catheter is held in place by a balloon inside your bladder. Urine will drain continuously from the bladder into a bag. At night, you will wear a larger drainage bag to collect the urine. Keep this bag in a container, such as a plastic waste basket, in case of accidental leakage. During the day, you may wear a smaller leg bag that fits onto your outer thigh. This smaller bag is discrete and can be worn under loose fitting clothes. The smaller bag will fill quickly and should be emptied every few hours. Note:  It is not unusual for urine to leak around the Ortega catheter. If leakage is severe, or if leakage is accompanied by spasms in the bladder ( a sharp pain/pressure in the pubic area followed by urinary leakage), a bladder antispasmodic may be called into your pharmacy. Please contact the office during normal business hours if you would like this called. Please be aware that such antispasmodics can be constipating, so a stool softener is recommended as well. To empty the leg bag, first wash your hands. Remove the cap on the bottom of the leg bag, and then turn the valve to empty. When completed, close the valve, replace the cap and wash your hands. Follow the same directions to drain the larger bag. To change from one bag to another, first wash your hands. Empty the bag; carefully separate the catheter tubing from the tubing on the bag (note the two different colors that can help you distinguish where the tubing separates).   Next, connect the catheter to the new drainage tubing. Try not to touch the tip of the catheter or the tip of the drainage tubing  If possible. When finished, wash your hands again. Taping the catheter to your thigh will lessen the discomfort from the catheter. Use adhesive or \"silk-like\" tape (not paper tape) that is 3 inches wide. Switching to a different thigh each day will prevent skin blistering. To clean the urinary collection bag, use warm soapy water, rinse clear, then clean with a solution of one tablespoon of vinegar to one quart of water and again rinse clear. Be sure to leave the drainage spout open while hanging the bag to dry.

## 2021-10-07 NOTE — PROGRESS NOTES
I have personally verified, reviewed, released, signed, authenticated, authorized, confirmed,finalized, and approved the actions of the [facility]. Garduno catheter placed by staff. Send urine for culture. Start Bactrim empirically.

## 2021-10-07 NOTE — TELEPHONE ENCOUNTER
Patient feels like he has retention. Only voided scant amount this am and stream was weak. May have passed a small clot. After speaking to Heather Head CNP scheduled today for lab visit.

## 2021-10-08 LAB
ORGANISM: ABNORMAL
URINE CULTURE, ROUTINE: ABNORMAL

## 2021-10-10 ENCOUNTER — TELEPHONE (OUTPATIENT)
Dept: UROLOGY | Age: 86
End: 2021-10-10

## 2021-10-10 RX ORDER — AMOXICILLIN AND CLAVULANATE POTASSIUM 875; 125 MG/1; MG/1
1 TABLET, FILM COATED ORAL 2 TIMES DAILY
Qty: 20 TABLET | Refills: 0 | Status: ON HOLD | OUTPATIENT
Start: 2021-10-10 | End: 2021-10-15 | Stop reason: HOSPADM

## 2021-10-11 ENCOUNTER — TELEPHONE (OUTPATIENT)
Dept: INTERNAL MEDICINE CLINIC | Age: 86
End: 2021-10-11

## 2021-10-11 RX ORDER — TRAZODONE HYDROCHLORIDE 50 MG/1
TABLET ORAL
Qty: 15 TABLET | Refills: 0 | Status: SHIPPED | OUTPATIENT
Start: 2021-10-11 | End: 2021-11-02 | Stop reason: ALTCHOICE

## 2021-10-11 NOTE — TELEPHONE ENCOUNTER
Notified pt script sent to Dr. Johnson Primes for authorization and to try 0.5 tablet first and if no help then take 1 tablet the next night. He verbalizes understanding.

## 2021-10-11 NOTE — TELEPHONE ENCOUNTER
urojet sent, 2% lidocaine jelly  Apply PRN for pain  I have personally verified, reviewed, and approved these actions.

## 2021-10-11 NOTE — TELEPHONE ENCOUNTER
Patient stated the edema has increased and is very concerned. He will  the lidocaine ointment and use for pain. Appointment scheduled with Dr Saturnino Gaffney tomorrow to evaluate.

## 2021-10-11 NOTE — TELEPHONE ENCOUNTER
Patient c/o tip of the penis is red and slightly edematous. It is painful and very sensitive at the insertion site of the catheter. He started the Augmentin. The catheter is patent with good output and secured to his leg. He thinks the irritation was caused the having the catheter inserted. Patient advised to use tylenol or ibuprofen for pain control and to apply antibiotic ointment around the catheter at the insertion site. Please advise.  Thank you

## 2021-10-11 NOTE — TELEPHONE ENCOUNTER
/Pt called in saying he is starting to feel better urinary wise. He has a ortega in and they called him yesterday and29/21 started another antibiotic. He is getting a CT scan on 10/20/21 and has a follow up with urology on 10/29/21. Pt is having trouble sleeping. He thought it was related to getting up several times at night to urinate but since he has a ortega in he is still not sleeping. His wife had some ambien 5 mg that he has tried for the last 2 nights. He is sleeping somewhat better. Last night he went to bed at 9:30 pm and fell asleep around 10. He woke up at 2:00 am and was up until 4:30 or 5:00 am and feel back to sleep. Got up at around 9:30 am.  He is not sure why he is not sleeping. He says he does not think he snores. His throat gets dry  He has to clear his throat. He thinks he is a mouth breather. He is asking if Dr Meryl Urias will give him some kind of sleep aid? He has tried melatonin in the past with no relief.

## 2021-10-12 ENCOUNTER — OFFICE VISIT (OUTPATIENT)
Dept: UROLOGY | Age: 86
End: 2021-10-12
Payer: MEDICARE

## 2021-10-12 VITALS
WEIGHT: 172 LBS | DIASTOLIC BLOOD PRESSURE: 76 MMHG | BODY MASS INDEX: 23.3 KG/M2 | SYSTOLIC BLOOD PRESSURE: 118 MMHG | HEIGHT: 72 IN

## 2021-10-12 DIAGNOSIS — Z85.46 HISTORY OF PROSTATE CANCER: ICD-10-CM

## 2021-10-12 DIAGNOSIS — R31.0 GROSS HEMATURIA: Primary | ICD-10-CM

## 2021-10-12 DIAGNOSIS — Z92.3 HISTORY OF BRACHYTHERAPY: ICD-10-CM

## 2021-10-12 PROCEDURE — 99213 OFFICE O/P EST LOW 20 MIN: CPT | Performed by: UROLOGY

## 2021-10-12 PROCEDURE — G8420 CALC BMI NORM PARAMETERS: HCPCS | Performed by: UROLOGY

## 2021-10-12 PROCEDURE — G8427 DOCREV CUR MEDS BY ELIG CLIN: HCPCS | Performed by: UROLOGY

## 2021-10-12 PROCEDURE — 4040F PNEUMOC VAC/ADMIN/RCVD: CPT | Performed by: UROLOGY

## 2021-10-12 PROCEDURE — G8484 FLU IMMUNIZE NO ADMIN: HCPCS | Performed by: UROLOGY

## 2021-10-12 PROCEDURE — 1036F TOBACCO NON-USER: CPT | Performed by: UROLOGY

## 2021-10-12 PROCEDURE — 1123F ACP DISCUSS/DSCN MKR DOCD: CPT | Performed by: UROLOGY

## 2021-10-12 NOTE — PROGRESS NOTES
JUN Russell MD        [address] [address]  Dept: [phone]  Dept Fax: [phone]: [address] [address] Urology Office Note -     Patient:  Aditya Noonan  YOB: 1935  Date: 10/12/2021    The patient is a 80 y.o. male who presents today for evaluation of the following problems:   Chief Complaint   Patient presents with    Follow-up     swelling and pain around penis    Urinary Retention    referred/consultation requested by Holly Pelaez MD.    HISTORY OF PRESENT ILLNESS:     Penile edema  After last catheter placement    Urinary retention  Failed voiding trials   On flomax    Gross hematuria  Several episodes-- blood clots  Scheduled for cystoscopy and CT urogram    Prostate cancer  Hx of brachytherapy  psa undetectable when last checked      Requested/reviewed records from Holly Pelaez MD office and/or outside [de-identified]    (Patient's old records have been requested, reviewed and pertinent findings summarized in today's note.)    Procedures Today: N/A      Last several PSA's:  Lab Results   Component Value Date    PSA <0.02 05/08/2015       Last total testosterone:  No results found for: TESTOSTERONE    Urinalysis today:  No results found for this visit on 10/12/21. Last BUN and creatinine:  Lab Results   Component Value Date    BUN 20 03/04/2021     Lab Results   Component Value Date    CREATININE 0.9 03/04/2021         Imaging Reviewed during this Office Visit:   Lilly Villanueva MD independently reviewed the images and verified the radiology reports from:    DEXA BONE DENSITY AXIAL SKELETON    Result Date: 3/12/2021  EXAM: DEXA BONE DENSITY AXIAL SKELETON HISTORY: 80 years, Male, Localized osteoporosis without current pathological fracture COMPARISON: 12/26/2018. FINDINGS: Bone densitometry has been performed using a Hologic bone densitometer.  The routine evaluation includes the lumbar spine and both hips. Evaluation of the L1, L3, L4 of the lumbar spine demonstrates an average bone mineral density measurement of 0.984g/cm2 which corresponds to a T-score of -1.0 and a Z-score of 0.3. Previously average bone mineral density measurement of 1.022 g/cm2 which corresponds to a T-score of -0.6 and a Z-score of 0.6. This qualifies as normal bone mineral density. The calculated bone density in the left femoral neck is 0.629 g/cm2 which corresponds to a T-score of  -2.2 and a Z-score of -0.5. Previously average bone mineral density measurement of 0.611 g/cm2 which corresponds to a T-score of -2.3 and a Z-score of -0.7. This qualifies as osteopenia. The calculated bone density in the right femoral neck is 0.689 g/cm2 which corresponds to a T-score of  minus 1. and a Z-score of -0.1. Previously average bone mineral density measurement of 0.659 g/cm2 which corresponds to a T-score of -2.0 and a Z-score of -0.3. This qualifies as osteopenia. BMD changes versus previous is -3.7% in the spine. BMD change versus previous is 3.9% for the hips using the mean. This patient is osteopenic using the World Health Organization criteria. The patient is at a medium risk for fracture. 10 year probability of major osteoporotic fracture: 21% 10 year probability of hip fracture: 16% The patient's meets criteria for referral to a bone fragility clinic. **This report has been created using voice recognition software. It may contain minor errors which are inherent in voice recognition technology. ** Final report electronically signed by Dr. Kecia Tabor on 3/12/2021 1:32 PM      PAST MEDICAL, FAMILY AND SOCIAL HISTORY:  Past Medical History:   Diagnosis Date    Hyperlipidemia     Hypertension     Prostate cancer ([facility] [address]      Past Surgical History:   Procedure Laterality Date    COLONOSCOPY      HEMORRHOID SURGERY  1970    HERNIA REPAIR      MD COLONOSCOPY FLX DX W/COLLJ SPEC WHEN PFRMD Left 2018    COLONOSCOPY performed by Johnny Hammonds MD at [place] [address]      seed implant    TONSILLECTOMY       Family History   Problem Relation Age of Onset    Diabetes Father      Outpatient Medications Marked as Taking for the 10/12/21 encounter (Office Visit) with Chani Swartz MD   Medication Sig Dispense Refill    Acetaminophen (TYLENOL EXTRA STRENGTH PO) Take by mouth      lidocaine (XYLOCAINE) 2 % jelly Apply topically as needed. 1 each 0    traZODone (DESYREL) 50 MG tablet 25 to 50 mg. Nightly  prn sleep 15 tablet 0    amoxicillin-clavulanate (AUGMENTIN) 875-125 MG per tablet Take 1 tablet by mouth 2 times daily for 10 days 20 tablet 0    potassium chloride (KLOR-CON M) 20 MEQ extended release tablet Take 1 tablet by mouth daily 90 tablet 3    Calcium Carb-Cholecalciferol (CALCIUM 1000 + D PO) Take 1 tablet by mouth daily      amLODIPine (NORVASC) 10 MG tablet TAKE 1 TABLET EVERY DAY 90 tablet 3    lisinopril (PRINIVIL;ZESTRIL) 10 MG tablet TAKE 1 TABLET TWICE DAILY 180 tablet 3    aspirin 81 MG EC tablet Take 81 mg by mouth daily      Apoaequorin (PREVAGEN) 10 MG CAPS Take 10 mg by mouth daily      UNABLE TO FIND Kefir 8 0z daily      MULTIPLE VITAMIN PO Take by mouth daily         Patient has no known allergies.   Social History     Tobacco Use   Smoking Status Former Smoker    Quit date: 1983    Years since quittin.9   Smokeless Tobacco Never Used      (If patient a smoker, smoking cessation counseling offered)   Social History     Substance and Sexual Activity   Alcohol Use No       REVIEW OF SYSTEMS:  Constitutional: negative  Eyes: negative  Respiratory: negative  Cardiovascular: negative  Gastrointestinal: negative  Genitourinary: see HPI  Musculoskeletal: negative  Skin: negative   Neurological: negative  Hematological/Lymphatic: negative  Psychological: negative        Physical Exam:    This a 80 y.o. male  Vitals:    10/12/21 1458   BP: 118/76 Body mass index is 23.33 kg/m². Constitutional: Patient in no acute distress;         Assessment and Plan        1. Gross hematuria    2. History of prostate cancer    3. History of brachytherapy               Plan:       Penile edema is normal-- reassurance provided for patient. This was from irritation from catheter placement- will need to keep it in  Proteus UTI- new problem. Finish abx. Gross hematuria with blood clots-Needs CT urogram and cystoscopy and psa. Follow up arranged for this  Hx of brachytherapy  Also with high volume retention (800) and failed voiding                Prescriptions Ordered:  No orders of the defined types were placed in this encounter. Orders Placed:  No orders of the defined types were placed in this encounter.            Juan Carlos Mccallum MD

## 2021-10-13 ENCOUNTER — TELEPHONE (OUTPATIENT)
Dept: UROLOGY | Age: 86
End: 2021-10-13

## 2021-10-13 ENCOUNTER — NURSE ONLY (OUTPATIENT)
Dept: UROLOGY | Age: 86
End: 2021-10-13
Payer: MEDICARE

## 2021-10-13 ENCOUNTER — HOSPITAL ENCOUNTER (INPATIENT)
Age: 86
LOS: 2 days | Discharge: HOME | DRG: 666 | End: 2021-10-15
Attending: PEDIATRICS | Admitting: PEDIATRICS
Payer: MEDICARE

## 2021-10-13 DIAGNOSIS — R31.0 GROSS HEMATURIA: ICD-10-CM

## 2021-10-13 DIAGNOSIS — R33.8 ACUTE URINARY RETENTION: Primary | ICD-10-CM

## 2021-10-13 PROBLEM — R31.9 HEMATURIA: Status: ACTIVE | Noted: 2021-10-13

## 2021-10-13 LAB
ANION GAP SERPL CALCULATED.3IONS-SCNC: 15 MEQ/L (ref 8–16)
BASOPHILS # BLD: 0.1 %
BASOPHILS ABSOLUTE: 0 THOU/MM3 (ref 0–0.1)
BUN BLDV-MCNC: 17 MG/DL (ref 7–22)
CALCIUM SERPL-MCNC: 9.2 MG/DL (ref 8.5–10.5)
CHLORIDE BLD-SCNC: 92 MEQ/L (ref 98–111)
CO2: 23 MEQ/L (ref 23–33)
CREAT SERPL-MCNC: 1 MG/DL (ref 0.4–1.2)
EOSINOPHIL # BLD: 0.4 %
EOSINOPHILS ABSOLUTE: 0 THOU/MM3 (ref 0–0.4)
ERYTHROCYTE [DISTWIDTH] IN BLOOD BY AUTOMATED COUNT: 13.1 % (ref 11.5–14.5)
ERYTHROCYTE [DISTWIDTH] IN BLOOD BY AUTOMATED COUNT: 46.9 FL (ref 35–45)
GFR SERPL CREATININE-BSD FRML MDRD: 71 ML/MIN/1.73M2
GLUCOSE BLD-MCNC: 109 MG/DL (ref 70–108)
HCT VFR BLD CALC: 37.3 % (ref 42–52)
HEMOGLOBIN: 12.6 GM/DL (ref 14–18)
IMMATURE GRANS (ABS): 0.04 THOU/MM3 (ref 0–0.07)
IMMATURE GRANULOCYTES: 0.4 %
LYMPHOCYTES # BLD: 10.8 %
LYMPHOCYTES ABSOLUTE: 1 THOU/MM3 (ref 1–4.8)
MCH RBC QN AUTO: 32.7 PG (ref 26–33)
MCHC RBC AUTO-ENTMCNC: 33.8 GM/DL (ref 32.2–35.5)
MCV RBC AUTO: 96.9 FL (ref 80–94)
MONOCYTES # BLD: 8.8 %
MONOCYTES ABSOLUTE: 0.8 THOU/MM3 (ref 0.4–1.3)
NUCLEATED RED BLOOD CELLS: 0 /100 WBC
OSMOLALITY CALCULATION: 262.9 MOSMOL/KG (ref 275–300)
PLATELET # BLD: 458 THOU/MM3 (ref 130–400)
PMV BLD AUTO: 9 FL (ref 9.4–12.4)
POTASSIUM SERPL-SCNC: 4.1 MEQ/L (ref 3.5–5.2)
RBC # BLD: 3.85 MILL/MM3 (ref 4.7–6.1)
SEG NEUTROPHILS: 79.5 %
SEGMENTED NEUTROPHILS ABSOLUTE COUNT: 7.6 THOU/MM3 (ref 1.8–7.7)
SODIUM BLD-SCNC: 130 MEQ/L (ref 135–145)
WBC # BLD: 9.6 THOU/MM3 (ref 4.8–10.8)

## 2021-10-13 PROCEDURE — 51700 IRRIGATION OF BLADDER: CPT | Performed by: NURSE PRACTITIONER

## 2021-10-13 PROCEDURE — 80048 BASIC METABOLIC PNL TOTAL CA: CPT

## 2021-10-13 PROCEDURE — 1200000000 HC SEMI PRIVATE

## 2021-10-13 PROCEDURE — 6370000000 HC RX 637 (ALT 250 FOR IP): Performed by: NURSE PRACTITIONER

## 2021-10-13 PROCEDURE — 36415 COLL VENOUS BLD VENIPUNCTURE: CPT

## 2021-10-13 PROCEDURE — 99223 1ST HOSP IP/OBS HIGH 75: CPT | Performed by: PEDIATRICS

## 2021-10-13 PROCEDURE — 99285 EMERGENCY DEPT VISIT HI MDM: CPT

## 2021-10-13 PROCEDURE — 85025 COMPLETE CBC W/AUTO DIFF WBC: CPT

## 2021-10-13 RX ORDER — TAMSULOSIN HYDROCHLORIDE 0.4 MG/1
0.4 CAPSULE ORAL DAILY
Qty: 90 CAPSULE | Refills: 3 | Status: SHIPPED | OUTPATIENT
Start: 2021-10-13 | End: 2021-11-02 | Stop reason: SDUPTHER

## 2021-10-13 RX ORDER — ATROPA BELLADONNA AND OPIUM 16.2; 3 MG/1; MG/1
30 SUPPOSITORY RECTAL ONCE
Status: COMPLETED | OUTPATIENT
Start: 2021-10-13 | End: 2021-10-13

## 2021-10-13 RX ORDER — TAMSULOSIN HYDROCHLORIDE 0.4 MG/1
0.4 CAPSULE ORAL DAILY
COMMUNITY
End: 2021-10-13

## 2021-10-13 RX ADMIN — ATROPA BELLADONNA AND OPIUM 30 MG: 16.2; 3 SUPPOSITORY RECTAL at 22:14

## 2021-10-13 ASSESSMENT — PAIN DESCRIPTION - LOCATION: LOCATION: OTHER (COMMENT)

## 2021-10-13 ASSESSMENT — PAIN DESCRIPTION - DESCRIPTORS: DESCRIPTORS: PRESSURE

## 2021-10-13 ASSESSMENT — PAIN DESCRIPTION - PAIN TYPE: TYPE: ACUTE PAIN

## 2021-10-13 ASSESSMENT — PAIN SCALES - GENERAL: PAINLEVEL_OUTOF10: 1

## 2021-10-13 NOTE — TELEPHONE ENCOUNTER
Patient seen DR Emir Soriano yesterday. Last night the catheter became occluded around 8-9 pm and was not patent until 0500 am. He pushed fluids and took a hot shower then he passed a clot and felt much better. The urine is cherry red and passing tiny flecks of clots. The catheter is draining well and passed a lot of urine. He is on baby aspirin and takes flomax 0.4 mg at bedtime. He will need a new prescription for the flomax. He is still on Augmentin. Patient advised if the catheter becomes occluded and if the office is close to go to the ER to be evaluated. Do you want the aspirin held until the hematuria clears up? He will also continue to push fluids. Please advise. Thank you.

## 2021-10-13 NOTE — TELEPHONE ENCOUNTER
Patient advised to hold the aspirin for 5 days and flomax was sent to the pharmacy. He voiced understanding.

## 2021-10-13 NOTE — ED NOTES
ED nurse-to-nurse bedside report    Chief Complaint   Patient presents with    Hematuria      LOC: alert and orientated to name, place, date  Vital signs   Vitals:    10/13/21 1837   BP: (!) 153/77   Pulse: 82   Resp: 16   Temp: 98.1 °F (36.7 °C)   TempSrc: Oral   SpO2: 99%   Weight: 170 lb (77.1 kg)   Height: 6' (1.829 m)      Pain:    Pain Interventions: none  Pain Goal: none  Oxygen: No    Current needs required none   Telemetry: No  LDAs:   Peripheral IV 10/13/21 Right Antecubital (Active)   Site Assessment Clean;Dry; Intact 10/13/21 1908   Line Status Normal saline locked;Specimen collected 10/13/21 1908     Continuous Infusions:   Mobility: Independent  [place] Fall Risk Score:    Fall Risk 3/4/2021 1/28/2020 6/6/2018 5/11/2017 5/3/2016   2 or more falls in past year? no no no no no   Fall with injury in past year? no no no no no     Fall Interventions: none  Report given to: [facility]     Ladonna Toscano RN  10/13/21 9758

## 2021-10-13 NOTE — PROGRESS NOTES
Pt here with clot retention. Bright red blood with several clots returned after irrigating with 1800 ml of sterile saline. Discussed options with wife and pt. He was up all night, the night prior with trouble with catheter draining. Does not want to go through that again. ASA has been held. Attempted to direct admit pt, no beds available. He was advised to go to ER for CBI and labs and imaging  He was in agreement. No fever, chills, back pain, or dizziness. Report was called to the ER.     Electronically signed by MARIKA Kaiser CNP on 10/14/2021 at 5:17 PM

## 2021-10-13 NOTE — TELEPHONE ENCOUNTER
Patient returned the call stating his catheter is occluded. After speaking to [facility] CNP, patient instructed to come to the office.

## 2021-10-13 NOTE — ED TRIAGE NOTES
Pt had catheter placed on Friday at Dr Josey Nixon office for inability to urinate- He was seen by Dr Chalo Kaiser yesterday for swelling in the penis and foreskin. Last night patient had no output. He tried drinking water, showering, and eventually had a large amount of urine output. This afternoon he had increasing abdominal pressure and blood in his urine and was seen in urology by John Celaya. Urology irrigated at that time. Patient continued to have blood clots and was sent to the emergency room for further evaluation and irrigation.  Pt states he has been on Augmentin for infection and has had some loose stools with that

## 2021-10-14 ENCOUNTER — ANESTHESIA (OUTPATIENT)
Dept: OPERATING ROOM | Age: 86
DRG: 666 | End: 2021-10-14
Payer: MEDICARE

## 2021-10-14 ENCOUNTER — ANESTHESIA EVENT (OUTPATIENT)
Dept: OPERATING ROOM | Age: 86
DRG: 666 | End: 2021-10-14
Payer: MEDICARE

## 2021-10-14 ENCOUNTER — APPOINTMENT (OUTPATIENT)
Dept: CT IMAGING | Age: 86
DRG: 666 | End: 2021-10-14
Payer: MEDICARE

## 2021-10-14 VITALS — SYSTOLIC BLOOD PRESSURE: 99 MMHG | OXYGEN SATURATION: 96 % | TEMPERATURE: 96.8 F | DIASTOLIC BLOOD PRESSURE: 54 MMHG

## 2021-10-14 LAB
ANION GAP SERPL CALCULATED.3IONS-SCNC: 10 MEQ/L (ref 8–16)
BASOPHILS # BLD: 0.2 %
BASOPHILS ABSOLUTE: 0 THOU/MM3 (ref 0–0.1)
BUN BLDV-MCNC: 13 MG/DL (ref 7–22)
CALCIUM SERPL-MCNC: 9.1 MG/DL (ref 8.5–10.5)
CHLORIDE BLD-SCNC: 103 MEQ/L (ref 98–111)
CO2: 25 MEQ/L (ref 23–33)
CREAT SERPL-MCNC: 1 MG/DL (ref 0.4–1.2)
EOSINOPHIL # BLD: 0.2 %
EOSINOPHILS ABSOLUTE: 0 THOU/MM3 (ref 0–0.4)
ERYTHROCYTE [DISTWIDTH] IN BLOOD BY AUTOMATED COUNT: 13.2 % (ref 11.5–14.5)
ERYTHROCYTE [DISTWIDTH] IN BLOOD BY AUTOMATED COUNT: 48.2 FL (ref 35–45)
GFR SERPL CREATININE-BSD FRML MDRD: 71 ML/MIN/1.73M2
GLUCOSE BLD-MCNC: 115 MG/DL (ref 70–108)
HCT VFR BLD CALC: 38 % (ref 42–52)
HEMOGLOBIN: 12.6 GM/DL (ref 14–18)
IMMATURE GRANS (ABS): 0.04 THOU/MM3 (ref 0–0.07)
IMMATURE GRANULOCYTES: 0.4 %
LYMPHOCYTES # BLD: 9.2 %
LYMPHOCYTES ABSOLUTE: 0.9 THOU/MM3 (ref 1–4.8)
MAGNESIUM: 2 MG/DL (ref 1.6–2.4)
MCH RBC QN AUTO: 32.9 PG (ref 26–33)
MCHC RBC AUTO-ENTMCNC: 33.2 GM/DL (ref 32.2–35.5)
MCV RBC AUTO: 99.2 FL (ref 80–94)
MONOCYTES # BLD: 9.8 %
MONOCYTES ABSOLUTE: 0.9 THOU/MM3 (ref 0.4–1.3)
NUCLEATED RED BLOOD CELLS: 0 /100 WBC
PLATELET # BLD: 418 THOU/MM3 (ref 130–400)
PMV BLD AUTO: 9 FL (ref 9.4–12.4)
POTASSIUM SERPL-SCNC: 4.3 MEQ/L (ref 3.5–5.2)
RBC # BLD: 3.83 MILL/MM3 (ref 4.7–6.1)
SEG NEUTROPHILS: 80.2 %
SEGMENTED NEUTROPHILS ABSOLUTE COUNT: 7.5 THOU/MM3 (ref 1.8–7.7)
SODIUM BLD-SCNC: 138 MEQ/L (ref 135–145)
WBC # BLD: 9.3 THOU/MM3 (ref 4.8–10.8)

## 2021-10-14 PROCEDURE — 80048 BASIC METABOLIC PNL TOTAL CA: CPT

## 2021-10-14 PROCEDURE — BT141ZZ FLUOROSCOPY OF KIDNEYS, URETERS AND BLADDER USING LOW OSMOLAR CONTRAST: ICD-10-PCS | Performed by: UROLOGY

## 2021-10-14 PROCEDURE — 88305 TISSUE EXAM BY PATHOLOGIST: CPT

## 2021-10-14 PROCEDURE — 6360000002 HC RX W HCPCS: Performed by: NURSE ANESTHETIST, CERTIFIED REGISTERED

## 2021-10-14 PROCEDURE — 6370000000 HC RX 637 (ALT 250 FOR IP): Performed by: PEDIATRICS

## 2021-10-14 PROCEDURE — 0VB08ZZ EXCISION OF PROSTATE, VIA NATURAL OR ARTIFICIAL OPENING ENDOSCOPIC: ICD-10-PCS | Performed by: UROLOGY

## 2021-10-14 PROCEDURE — 3700000001 HC ADD 15 MINUTES (ANESTHESIA): Performed by: UROLOGY

## 2021-10-14 PROCEDURE — 99232 SBSQ HOSP IP/OBS MODERATE 35: CPT | Performed by: HOSPITALIST

## 2021-10-14 PROCEDURE — 3600000013 HC SURGERY LEVEL 3 ADDTL 15MIN: Performed by: UROLOGY

## 2021-10-14 PROCEDURE — 2709999900 HC NON-CHARGEABLE SUPPLY: Performed by: UROLOGY

## 2021-10-14 PROCEDURE — 83735 ASSAY OF MAGNESIUM: CPT

## 2021-10-14 PROCEDURE — 1200000000 HC SEMI PRIVATE

## 2021-10-14 PROCEDURE — 2500000003 HC RX 250 WO HCPCS: Performed by: NURSE ANESTHETIST, CERTIFIED REGISTERED

## 2021-10-14 PROCEDURE — 36415 COLL VENOUS BLD VENIPUNCTURE: CPT

## 2021-10-14 PROCEDURE — 6360000004 HC RX CONTRAST MEDICATION: Performed by: NURSE PRACTITIONER

## 2021-10-14 PROCEDURE — 0TCB8ZZ EXTIRPATION OF MATTER FROM BLADDER, VIA NATURAL OR ARTIFICIAL OPENING ENDOSCOPIC: ICD-10-PCS | Performed by: UROLOGY

## 2021-10-14 PROCEDURE — 7100000001 HC PACU RECOVERY - ADDTL 15 MIN: Performed by: UROLOGY

## 2021-10-14 PROCEDURE — 3700000000 HC ANESTHESIA ATTENDED CARE: Performed by: UROLOGY

## 2021-10-14 PROCEDURE — 2580000003 HC RX 258: Performed by: PEDIATRICS

## 2021-10-14 PROCEDURE — 6360000002 HC RX W HCPCS: Performed by: NURSE PRACTITIONER

## 2021-10-14 PROCEDURE — 85025 COMPLETE CBC W/AUTO DIFF WBC: CPT

## 2021-10-14 PROCEDURE — 2720000010 HC SURG SUPPLY STERILE: Performed by: UROLOGY

## 2021-10-14 PROCEDURE — 74178 CT ABD&PLV WO CNTR FLWD CNTR: CPT

## 2021-10-14 PROCEDURE — 3600000003 HC SURGERY LEVEL 3 BASE: Performed by: UROLOGY

## 2021-10-14 PROCEDURE — 6370000000 HC RX 637 (ALT 250 FOR IP): Performed by: NURSE PRACTITIONER

## 2021-10-14 PROCEDURE — 7100000000 HC PACU RECOVERY - FIRST 15 MIN: Performed by: UROLOGY

## 2021-10-14 PROCEDURE — 99221 1ST HOSP IP/OBS SF/LOW 40: CPT | Performed by: NURSE PRACTITIONER

## 2021-10-14 PROCEDURE — 2580000003 HC RX 258: Performed by: NURSE PRACTITIONER

## 2021-10-14 PROCEDURE — 2580000003 HC RX 258: Performed by: NURSE ANESTHETIST, CERTIFIED REGISTERED

## 2021-10-14 RX ORDER — POLYETHYLENE GLYCOL 3350 17 G/17G
17 POWDER, FOR SOLUTION ORAL DAILY PRN
Status: DISCONTINUED | OUTPATIENT
Start: 2021-10-14 | End: 2021-10-15 | Stop reason: HOSPADM

## 2021-10-14 RX ORDER — ACETAMINOPHEN 650 MG/1
650 SUPPOSITORY RECTAL EVERY 6 HOURS PRN
Status: DISCONTINUED | OUTPATIENT
Start: 2021-10-14 | End: 2021-10-15 | Stop reason: HOSPADM

## 2021-10-14 RX ORDER — GLYCOPYRROLATE 1 MG/5 ML
SYRINGE (ML) INTRAVENOUS PRN
Status: DISCONTINUED | OUTPATIENT
Start: 2021-10-14 | End: 2021-10-14 | Stop reason: SDUPTHER

## 2021-10-14 RX ORDER — LIDOCAINE HYDROCHLORIDE 20 MG/ML
JELLY TOPICAL PRN
Status: DISCONTINUED | OUTPATIENT
Start: 2021-10-14 | End: 2021-10-15 | Stop reason: HOSPADM

## 2021-10-14 RX ORDER — ATROPA BELLADONNA AND OPIUM 16.2; 3 MG/1; MG/1
30 SUPPOSITORY RECTAL EVERY 8 HOURS PRN
Status: DISCONTINUED | OUTPATIENT
Start: 2021-10-14 | End: 2021-10-15 | Stop reason: HOSPADM

## 2021-10-14 RX ORDER — LIDOCAINE HCL/PF 100 MG/5ML
SYRINGE (ML) INJECTION PRN
Status: DISCONTINUED | OUTPATIENT
Start: 2021-10-14 | End: 2021-10-14 | Stop reason: SDUPTHER

## 2021-10-14 RX ORDER — SODIUM CHLORIDE 9 MG/ML
INJECTION, SOLUTION INTRAVENOUS CONTINUOUS PRN
Status: DISCONTINUED | OUTPATIENT
Start: 2021-10-14 | End: 2021-10-14 | Stop reason: SDUPTHER

## 2021-10-14 RX ORDER — FENTANYL CITRATE 50 UG/ML
INJECTION, SOLUTION INTRAMUSCULAR; INTRAVENOUS PRN
Status: DISCONTINUED | OUTPATIENT
Start: 2021-10-14 | End: 2021-10-14 | Stop reason: SDUPTHER

## 2021-10-14 RX ORDER — ONDANSETRON 2 MG/ML
4 INJECTION INTRAMUSCULAR; INTRAVENOUS EVERY 6 HOURS PRN
Status: DISCONTINUED | OUTPATIENT
Start: 2021-10-14 | End: 2021-10-15 | Stop reason: HOSPADM

## 2021-10-14 RX ORDER — TAMSULOSIN HYDROCHLORIDE 0.4 MG/1
0.4 CAPSULE ORAL DAILY
Status: DISCONTINUED | OUTPATIENT
Start: 2021-10-14 | End: 2021-10-15 | Stop reason: HOSPADM

## 2021-10-14 RX ORDER — ONDANSETRON 2 MG/ML
4 INJECTION INTRAMUSCULAR; INTRAVENOUS
Status: ACTIVE | OUTPATIENT
Start: 2021-10-14 | End: 2021-10-14

## 2021-10-14 RX ORDER — ONDANSETRON 2 MG/ML
INJECTION INTRAMUSCULAR; INTRAVENOUS PRN
Status: DISCONTINUED | OUTPATIENT
Start: 2021-10-14 | End: 2021-10-14 | Stop reason: SDUPTHER

## 2021-10-14 RX ORDER — ONDANSETRON 4 MG/1
4 TABLET, ORALLY DISINTEGRATING ORAL EVERY 8 HOURS PRN
Status: DISCONTINUED | OUTPATIENT
Start: 2021-10-14 | End: 2021-10-15 | Stop reason: HOSPADM

## 2021-10-14 RX ORDER — DEXAMETHASONE SODIUM PHOSPHATE 10 MG/ML
INJECTION, EMULSION INTRAMUSCULAR; INTRAVENOUS PRN
Status: DISCONTINUED | OUTPATIENT
Start: 2021-10-14 | End: 2021-10-14 | Stop reason: SDUPTHER

## 2021-10-14 RX ORDER — OXYBUTYNIN CHLORIDE 5 MG/1
5 TABLET ORAL EVERY 8 HOURS PRN
Status: DISCONTINUED | OUTPATIENT
Start: 2021-10-14 | End: 2021-10-15 | Stop reason: HOSPADM

## 2021-10-14 RX ORDER — DIPHENHYDRAMINE HYDROCHLORIDE 50 MG/ML
12.5 INJECTION INTRAMUSCULAR; INTRAVENOUS
Status: ACTIVE | OUTPATIENT
Start: 2021-10-14 | End: 2021-10-14

## 2021-10-14 RX ORDER — LISINOPRIL 10 MG/1
10 TABLET ORAL DAILY
Status: DISCONTINUED | OUTPATIENT
Start: 2021-10-14 | End: 2021-10-15 | Stop reason: HOSPADM

## 2021-10-14 RX ORDER — OXYCODONE HYDROCHLORIDE AND ACETAMINOPHEN 5; 325 MG/1; MG/1
1 TABLET ORAL
Status: ACTIVE | OUTPATIENT
Start: 2021-10-14 | End: 2021-10-14

## 2021-10-14 RX ORDER — HYDRALAZINE HYDROCHLORIDE 20 MG/ML
5 INJECTION INTRAMUSCULAR; INTRAVENOUS EVERY 10 MIN PRN
Status: DISCONTINUED | OUTPATIENT
Start: 2021-10-14 | End: 2021-10-15 | Stop reason: HOSPADM

## 2021-10-14 RX ORDER — POTASSIUM CHLORIDE 7.45 MG/ML
10 INJECTION INTRAVENOUS PRN
Status: DISCONTINUED | OUTPATIENT
Start: 2021-10-14 | End: 2021-10-15 | Stop reason: HOSPADM

## 2021-10-14 RX ORDER — MAGNESIUM SULFATE IN WATER 40 MG/ML
2000 INJECTION, SOLUTION INTRAVENOUS PRN
Status: DISCONTINUED | OUTPATIENT
Start: 2021-10-14 | End: 2021-10-15 | Stop reason: HOSPADM

## 2021-10-14 RX ORDER — CEFAZOLIN SODIUM 1 G/3ML
INJECTION, POWDER, FOR SOLUTION INTRAMUSCULAR; INTRAVENOUS PRN
Status: DISCONTINUED | OUTPATIENT
Start: 2021-10-14 | End: 2021-10-14 | Stop reason: SDUPTHER

## 2021-10-14 RX ORDER — SODIUM CHLORIDE 0.9 % (FLUSH) 0.9 %
10 SYRINGE (ML) INJECTION PRN
Status: DISCONTINUED | OUTPATIENT
Start: 2021-10-14 | End: 2021-10-15 | Stop reason: HOSPADM

## 2021-10-14 RX ORDER — ACETAMINOPHEN 325 MG/1
650 TABLET ORAL EVERY 6 HOURS PRN
Status: DISCONTINUED | OUTPATIENT
Start: 2021-10-14 | End: 2021-10-15 | Stop reason: HOSPADM

## 2021-10-14 RX ORDER — SODIUM CHLORIDE 9 MG/ML
25 INJECTION, SOLUTION INTRAVENOUS PRN
Status: DISCONTINUED | OUTPATIENT
Start: 2021-10-14 | End: 2021-10-15 | Stop reason: HOSPADM

## 2021-10-14 RX ORDER — PROPOFOL 10 MG/ML
INJECTION, EMULSION INTRAVENOUS PRN
Status: DISCONTINUED | OUTPATIENT
Start: 2021-10-14 | End: 2021-10-14 | Stop reason: SDUPTHER

## 2021-10-14 RX ORDER — FENTANYL CITRATE 50 UG/ML
25 INJECTION, SOLUTION INTRAMUSCULAR; INTRAVENOUS EVERY 5 MIN PRN
Status: DISCONTINUED | OUTPATIENT
Start: 2021-10-14 | End: 2021-10-15 | Stop reason: HOSPADM

## 2021-10-14 RX ORDER — MEPERIDINE HYDROCHLORIDE 25 MG/ML
12.5 INJECTION INTRAMUSCULAR; INTRAVENOUS; SUBCUTANEOUS EVERY 5 MIN PRN
Status: DISCONTINUED | OUTPATIENT
Start: 2021-10-14 | End: 2021-10-15 | Stop reason: HOSPADM

## 2021-10-14 RX ORDER — PHENYLEPHRINE HYDROCHLORIDE 10 MG/ML
INJECTION INTRAVENOUS PRN
Status: DISCONTINUED | OUTPATIENT
Start: 2021-10-14 | End: 2021-10-14 | Stop reason: SDUPTHER

## 2021-10-14 RX ORDER — PROMETHAZINE HYDROCHLORIDE 25 MG/ML
6.25 INJECTION, SOLUTION INTRAMUSCULAR; INTRAVENOUS
Status: ACTIVE | OUTPATIENT
Start: 2021-10-14 | End: 2021-10-14

## 2021-10-14 RX ORDER — AMLODIPINE BESYLATE 10 MG/1
10 TABLET ORAL DAILY
Status: DISCONTINUED | OUTPATIENT
Start: 2021-10-14 | End: 2021-10-15 | Stop reason: HOSPADM

## 2021-10-14 RX ORDER — SODIUM CHLORIDE 0.9 % (FLUSH) 0.9 %
10 SYRINGE (ML) INJECTION EVERY 12 HOURS SCHEDULED
Status: DISCONTINUED | OUTPATIENT
Start: 2021-10-14 | End: 2021-10-15 | Stop reason: HOSPADM

## 2021-10-14 RX ORDER — TRAZODONE HYDROCHLORIDE 50 MG/1
25 TABLET ORAL NIGHTLY PRN
Status: DISCONTINUED | OUTPATIENT
Start: 2021-10-14 | End: 2021-10-15 | Stop reason: HOSPADM

## 2021-10-14 RX ORDER — MAGNESIUM HYDROXIDE/ALUMINUM HYDROXICE/SIMETHICONE 120; 1200; 1200 MG/30ML; MG/30ML; MG/30ML
30 SUSPENSION ORAL EVERY 6 HOURS PRN
Status: DISCONTINUED | OUTPATIENT
Start: 2021-10-14 | End: 2021-10-15 | Stop reason: HOSPADM

## 2021-10-14 RX ORDER — FENTANYL CITRATE 50 UG/ML
50 INJECTION, SOLUTION INTRAMUSCULAR; INTRAVENOUS EVERY 5 MIN PRN
Status: DISCONTINUED | OUTPATIENT
Start: 2021-10-14 | End: 2021-10-15 | Stop reason: HOSPADM

## 2021-10-14 RX ADMIN — PHENYLEPHRINE HYDROCHLORIDE 100 MCG: 10 INJECTION INTRAVENOUS at 13:37

## 2021-10-14 RX ADMIN — Medication 0.4 MG: at 13:42

## 2021-10-14 RX ADMIN — IOPAMIDOL 80 ML: 755 INJECTION, SOLUTION INTRAVENOUS at 10:57

## 2021-10-14 RX ADMIN — PHENYLEPHRINE HYDROCHLORIDE 100 MCG: 10 INJECTION INTRAVENOUS at 13:42

## 2021-10-14 RX ADMIN — PHENYLEPHRINE HYDROCHLORIDE 200 MCG: 10 INJECTION INTRAVENOUS at 13:44

## 2021-10-14 RX ADMIN — ONDANSETRON HYDROCHLORIDE 4 MG: 4 INJECTION, SOLUTION INTRAMUSCULAR; INTRAVENOUS at 13:30

## 2021-10-14 RX ADMIN — SODIUM CHLORIDE: 9 INJECTION, SOLUTION INTRAVENOUS at 13:25

## 2021-10-14 RX ADMIN — Medication 100 MG: at 13:30

## 2021-10-14 RX ADMIN — FENTANYL CITRATE 25 MCG: 50 INJECTION, SOLUTION INTRAMUSCULAR; INTRAVENOUS at 13:55

## 2021-10-14 RX ADMIN — TRAZODONE HYDROCHLORIDE 25 MG: 50 TABLET ORAL at 20:49

## 2021-10-14 RX ADMIN — PROPOFOL 150 MG: 10 INJECTION, EMULSION INTRAVENOUS at 13:30

## 2021-10-14 RX ADMIN — SODIUM CHLORIDE, PRESERVATIVE FREE 10 ML: 5 INJECTION INTRAVENOUS at 09:08

## 2021-10-14 RX ADMIN — FENTANYL CITRATE 50 MCG: 50 INJECTION, SOLUTION INTRAMUSCULAR; INTRAVENOUS at 13:49

## 2021-10-14 RX ADMIN — CEFAZOLIN 2000 MG: 1 INJECTION, POWDER, FOR SOLUTION INTRAMUSCULAR; INTRAVENOUS at 13:35

## 2021-10-14 RX ADMIN — FENTANYL CITRATE 25 MCG: 50 INJECTION, SOLUTION INTRAMUSCULAR; INTRAVENOUS at 14:01

## 2021-10-14 RX ADMIN — TAMSULOSIN HYDROCHLORIDE 0.4 MG: 0.4 CAPSULE ORAL at 09:06

## 2021-10-14 RX ADMIN — SODIUM CHLORIDE, PRESERVATIVE FREE 10 ML: 5 INJECTION INTRAVENOUS at 20:46

## 2021-10-14 RX ADMIN — CEFTRIAXONE SODIUM 1000 MG: 1 INJECTION, POWDER, FOR SOLUTION INTRAMUSCULAR; INTRAVENOUS at 02:18

## 2021-10-14 RX ADMIN — LIDOCAINE HYDROCHLORIDE: 20 JELLY TOPICAL at 02:23

## 2021-10-14 RX ADMIN — AMLODIPINE BESYLATE 10 MG: 10 TABLET ORAL at 09:07

## 2021-10-14 RX ADMIN — DEXAMETHASONE SODIUM PHOSPHATE 10 MG: 10 INJECTION, EMULSION INTRAMUSCULAR; INTRAVENOUS at 13:35

## 2021-10-14 RX ADMIN — LISINOPRIL 10 MG: 10 TABLET ORAL at 09:06

## 2021-10-14 ASSESSMENT — PULMONARY FUNCTION TESTS
PIF_VALUE: 10
PIF_VALUE: 2
PIF_VALUE: 3
PIF_VALUE: 10
PIF_VALUE: 5
PIF_VALUE: 5
PIF_VALUE: 3
PIF_VALUE: 3
PIF_VALUE: 4
PIF_VALUE: 0
PIF_VALUE: 3
PIF_VALUE: 2
PIF_VALUE: 3
PIF_VALUE: 4
PIF_VALUE: 0
PIF_VALUE: 3
PIF_VALUE: 4
PIF_VALUE: 3
PIF_VALUE: 5
PIF_VALUE: 1
PIF_VALUE: 1
PIF_VALUE: 5
PIF_VALUE: 3
PIF_VALUE: 12
PIF_VALUE: 4
PIF_VALUE: 1
PIF_VALUE: 5
PIF_VALUE: 14
PIF_VALUE: 2
PIF_VALUE: 3
PIF_VALUE: 6
PIF_VALUE: 3
PIF_VALUE: 2
PIF_VALUE: 3
PIF_VALUE: 1
PIF_VALUE: 10
PIF_VALUE: 3
PIF_VALUE: 5
PIF_VALUE: 3
PIF_VALUE: 5
PIF_VALUE: 2
PIF_VALUE: 4
PIF_VALUE: 10
PIF_VALUE: 5

## 2021-10-14 ASSESSMENT — PAIN SCALES - GENERAL
PAINLEVEL_OUTOF10: 0

## 2021-10-14 NOTE — ED NOTES
ED to inpatient nurses report    Chief Complaint   Patient presents with    Hematuria      Present to ED from Home  LOC: alert and orientated to name, place, date  Vital signs   Vitals:    10/13/21 1837 10/13/21 2046 10/13/21 2147 10/13/21 2305   BP: (!) 153/77 (!) 145/57 138/63 (!) 117/54   Pulse: 82 86 75 71   Resp: 16 18 18 16   Temp: 98.1 °F (36.7 °C)      TempSrc: Oral      SpO2: 99% 97% 97% 95%   Weight: 170 lb (77.1 kg)      Height: 6' (1.829 m)         Oxygen Baseline Room air    Current needs required none Bipap/Cpap No  LDAs:   Peripheral IV 10/13/21 Right Antecubital (Active)   Site Assessment Clean;Dry; Intact 10/13/21 2306   Line Status Normal saline locked 10/13/21 2306   Dressing Status Clean;Dry; Intact 10/13/21 2306     Mobility: Independent  Pending ED orders: None  Present condition: Stable    Electronically signed by Joseline Yeager RN on 10/14/2021 at [address] [place] [address], RN  10/14/21 7270

## 2021-10-14 NOTE — ANESTHESIA POSTPROCEDURE EVALUATION
Department of Anesthesiology  Postprocedure Note    Patient: Kusum Seaman  MRN: [id number]  YOB: 1935  Date of evaluation: 10/14/2021  Time:  2:42 PM     Procedure Summary     Date: 10/14/21 [facility]    Anesthesia Start: 1325 Anesthesia Stop: 6877    Procedure: CYSTOSCOPY EVACUATION OF CLOTS, EVACUATION OF BLADDER STONE, CHANNEL TURP  (Left ) Diagnosis: (gross hematuria)    Surgeons: Vimal Ramos MD Responsible Provider: Chris Hernández DO    Anesthesia Type: general ASA Status: 2          Anesthesia Type: general    Jan Phase I: Jan Score: 9    Jan Phase II:      Last vitals: Reviewed and per EMR flowsheets.        Anesthesia Post Evaluation    Patient location during evaluation: PACU  Patient participation: complete - patient participated  Level of consciousness: awake and alert  Airway patency: patent  Nausea & Vomiting: no vomiting and no nausea  Complications: no  Cardiovascular status: hemodynamically stable  Respiratory status: acceptable  Hydration status: stable

## 2021-10-14 NOTE — PROGRESS NOTES
Physician Progress Note      PATIENT:               Bayron Reid  CSN #:                  [id number]  :                       1935  ADMIT DATE:       10/13/2021 6:12 PM  [address] [place] DATE:  RESPONDING  PROVIDER #:        Nicole Jones DO          QUERY TEXT:    Dr Josep Vazquez,    Pt admitted with Hematuria Pt noted to have Recent Proteus mirabilis UTI-give   IV ceftriaxone. If possible, please document in progress notes and discharge   summary the present on admission status of UTI:    The medical record reflects the following:  Risk Factors: elderly, Prostate Ca, HTN  Clinical Indicators: Per HP: Recent Proteus mirabilis UTI. Urine culture on   10/7 with Proteus growth. Sensitive to Augmentin and ceftriaxone. Hold oral   Augmentin and give IV ceftriaxone. Hematuria. Treatment: Hold oral Augmentin and give IV ceftriaxone  Options provided:  -- Yes, Proteus mirabilis UTI was present at the time of the order to admit to   the hospital  -- Proteus mirabilis UTI ruled out  -- Other - I will add my own diagnosis  -- Disagree - Not applicable / Not valid  -- Disagree - Clinically unable to determine / Unknown  -- Refer to Clinical Documentation Reviewer    PROVIDER RESPONSE TEXT:    Provider is clinically unable to determine a response to this query.     Query created by: Robert Corado on 10/14/2021 7:49 AM      Electronically signed by:  Nicole Jones DO 10/14/2021 7:57 AM

## 2021-10-14 NOTE — CARE COORDINATION
10/14/21, 7:43 AM EDT  DISCHARGE PLANNING EVALUATION:    Karen Began       Admitted: 10/13/2021/ [facility] day: 1   Location: 9X-07/247-A Reason for admit: Gross hematuria [R31.0]  Hematuria [R31.9]  Acute urinary retention [R33.8]   PMH:  has a past medical history of Hyperlipidemia, Hypertension, and Prostate cancer ([address] Procedure: N/A  Barriers to Discharge:  Patient presents due to gross hematuria. CT urogram, Urology consult, Rocephin, prn medications, Magnesium and Potassium replacement protocol, NPO, CBI, SCD's, up with assistance. PCP: Edilma Pickering MD  Readmission Risk Score: 9%    Patient Goals/Plan/Treatment Preferences: Met with Phillip's wife, Scottie Bernard, as he was off the unit for testing. Scottie Bernard verifies his insurance and PCP. They are able to afford his medications and deny a need for DME. [facility] Pavan Even manage their health care needs independently. Scottie Bernard states they manage the ortega catheter at home without difficulty other than his urinary retention. They decline HH. Transportation/Food Security/Housekeeping Addressed:  No issues identified.

## 2021-10-14 NOTE — PROGRESS NOTES
Hospitalist Progress Note      Patient:  Hardik Becker    Unit/Bed:5K-24/024-A  YOB: 1935  MRN: [id number]   Acct: [de-identified]   PCP: Trina Chavarria MD  Date of Admission: 10/13/2021    Assessment/Plan:    1. Severe hematuria:  History of prostate cancer status post seeding and radiation therapy 10 years ago. Follows with urology Dr. Bowers April. Hold aspirin. Continuous bladder irrigation overnight. Urology consultation. CBI urine looks much improved. Hemoglobin level maintained around 12. No evidence of gross blood loss. Pending urology evaluation.     2.  Urinary retention:  Secondary to severe hematuria with blood clots. Required chronic Garduno catheter since 10/8/2021. Continue Flomax. Continue plan as above in #1.     3.  Recent Proteus mirabilis UTI:  Urine culture on 10/7 with Proteus growth. Sensitive to Augmentin and ceftriaxone. Hold oral Augmentin and give IV ceftriaxone.     4. Hypertension:  Resume home dose lisinopril and Norvasc.     5. Insomnia:  As needed trazodone.     6. Fluids/electrolytes/nutrition:  Saline lock IV fluids. Follow-up borderline hypovolemic hyponatremia. Regular diet. Subjective (past 24 hours):   Patient was seen and examined at bedside. No acute overnight event. Urine and CBI appears to be improved. Patient not in acute distress. No new complaint. Denies chest pain or palpitation. No shortness of breath. No lightheadedness or dizziness. No focal neurologic changes. No abdominal symptoms. Past medical history, family history, social history and allergies reviewed again and is unchanged since admission. ROS (All review of systems completed. Pertinent positives noted.  Otherwise All other systems reviewed and negative.)     Medications:  Reviewed    Infusion Medications    sodium chloride       Scheduled Medications    amLODIPine  10 mg Oral Daily    lisinopril  10 mg Oral Daily    tamsulosin  0.4 mg Oral Daily    sodium chloride flush  10 mL IntraVENous 2 times per day    cefTRIAXone (ROCEPHIN) IV  1,000 mg IntraVENous Q24H     PRN Meds: traZODone, sodium chloride flush, sodium chloride, ondansetron **OR** ondansetron, polyethylene glycol, acetaminophen **OR** acetaminophen, potassium chloride, magnesium sulfate, aluminum & magnesium hydroxide-simethicone, oxybutynin, lidocaine, opium-belladonna      Intake/Output Summary (Last 24 hours) at 10/14/2021 0852  Last data filed at 10/14/2021 0626  Gross per 24 hour   Intake --   Output -2750 ml   Net 2750 ml       Diet:  Diet NPO    Exam:  /64   Pulse 80   Temp 97.8 °F (36.6 °C) (Oral)   Resp 17   Ht 6' (1.829 m)   Wt 169 lb 12.8 oz (77 kg)   SpO2 93%   BMI 23.03 kg/m²   General appearance: No apparent distress, well developed, appears stated age. Eyes:  Pupils equal, round, and reactive to light. Conjunctivae/corneas clear. HENT: Head normal in appearance. External nares normal.  Oral mucosa moist without lesions. Hearing grossly intact. Neck: Supple, with full range of motion. Trachea midline. No gross JVD appreciated. Respiratory:  Normal respiratory effort. Clear to auscultation, bilaterally without rales or wheezes or rhonchi. Cardiovascular: Normal rate, regular rhythm with normal S1/S2 without murmurs. No lower extremity edema. Abdomen: Soft, non-tender, non-distended with normal bowel sounds. Musculoskeletal: There is no joint swelling or tenderness. Normal tone. No abnormal movements. Skin: Warm and dry. No rashes or lesions. Neurologic:  No focal sensory/motor deficits in the upper and lower extremities. Cranial nerves:  grossly non-focal 2-12. Psychiatric: Alert and oriented, normal insight and thought content. Capillary Refill: Brisk,< 3 seconds. Peripheral Pulses: +2 palpable, equal bilaterally.       Labs:   Recent Labs     10/13/21  1905 10/14/21  0551   WBC 9.6 9.3   HGB 12.6* 12.6*   HCT 37.3* 38.0*   * 418*     Recent Labs     10/13/21  1905 10/14/21  0551   * 138   K 4.1 4.3   CL 92* 103   CO2 23 25   BUN 17 13   CREATININE 1.0 1.0   CALCIUM 9.2 9.1     No results for input(s): AST, ALT, BILIDIR, BILITOT, ALKPHOS in the last 72 hours. No results for input(s): INR in the last 72 hours. No results for input(s): Rhae Childes in the last 72 hours. Microbiology:    Blood culture #1: No results found for: BC    Blood culture #2:No results found for: BLOODCULT2    Organism:  Lab Results   Component Value Date    ORG Proteus mirabilis 10/07/2021       No results found for: LABGRAM    MRSA culture only:No results found for: [address] [place] [address]    Urine culture:   Lab Results   Component Value Date    LABURIN Kamas count: >100,000 CFU/mL  10/07/2021       Respiratory culture: No results found for: CULTRESP    Aerobic and Anaerobic :  No results found for: LABAERO  No results found for: LABANAE    Urinalysis:      Lab Results   Component Value Date    NITRU Negative 10/01/2021    BLOODU Large 10/01/2021    BLOODU NEGATIVE 12/21/2020    SPECGRAV 1.015 01/13/2016    GLUCOSEU Negative 10/01/2021       Radiology:  CT UROGRAM    (Results Pending)     No results found.     Electronically signed by Yan Valles DO on 10/14/2021 at 8:52 AM

## 2021-10-14 NOTE — CONSULTS
Urology Consult Note      Reason for Consult:  Gross hematuria  Requesting Physician:  Dr. Karen Malik    History Obtained From:  patient, electronic medical record    Chief Complaint: gross hematuria    HISTORY OF PRESENT ILLNESS:                The patient is a 80 y.o. male with significant past medical history of as listed below who presents with gross hematuria and clot retention. Pt is known to our practice. Seen most recently by Dr Ang Cam for gross hematuria and urinary retention. Pt was scheduled for outpatient cystoscopy and CT Urogram. He had catheter placed, after passing clots, and having retention on 10/7/2021, a 20 fr catheter was placed. Pts urine was significant for infection, and started on Augmentin. He continued to have hematuria and passing clots. Also was noted to have penile swelling for which he came to office on 10/12/2021 to have assessed. His ASA was stopped after calling in yesterday to the office to report troubling with catheter occlusion the night prior. He finally was able to get it draining yesterday morning at 5 am and felt relief. He called yesterday afternoon with pressure and clot retention. Was evaluated in office, after irrigating catheter with 1800 ml of sterile saline, clots were still being returned. It was advised he go to ER for CBI, and lab work. He agreed. Today pt reports passing clots through the night, and having episodes of retention on and off. He has just returned from CT Scan and catheter was clotted off. Pt was having bladder pressure. I was able to irrgat with 60 ml of sterile water and at least 300 ml of bloody urine was returned and several clots. Pt felt relief. He denies fever, nausea, vomiting or flank pain. He has been NPO since midnight. ASA is on hold. On Rocephin currently. Prior hx includes prostate cancer, Guy 7, diagnosed in 2005. Treated with IMRT and brachytherapy in May of 2005. On Flomax.   Most recent PSA 2015, was undetectable. Past Medical History:        Diagnosis Date    Hyperlipidemia     Hypertension     Prostate cancer [facility])      Past Surgical History:        Procedure Laterality Date    COLONOSCOPY      HEMORRHOID SURGERY  1970    HERNIA REPAIR      SD COLONOSCOPY FLX DX W/COLLJ SPEC WHEN PFRMD Left 2018    COLONOSCOPY performed by Maria Antonia Verde MD at [place] [address]      seed implant    TONSILLECTOMY       Allergies:  Patient has no known allergies. Social History     Socioeconomic History    Marital status:      Spouse name: Not on file    Number of children: Not on file    Years of education: Not on file    Highest education level: Not on file   Occupational History    Not on file   Tobacco Use    Smoking status: Former Smoker     Quit date: 1983     Years since quittin.9    Smokeless tobacco: Never Used   Vaping Use    Vaping Use: Never used   Substance and Sexual Activity    Alcohol use: No    Drug use: No    Sexual activity: Not on file   Other Topics Concern    Not on file   Social History Narrative    Not on file     Social Determinants of Health     Financial Resource Strain:     Difficulty of Paying Living Expenses:    Food Insecurity:     Worried About Running Out of Food in the Last Year:     [address] in the Last Year:    Transportation Needs:     Lack of Transportation (Medical):      Lack of Transportation (Non-Medical):    Physical Activity:     Days of Exercise per Week:     Minutes of Exercise per Session:    Stress:     Feeling of Stress :    Social Connections:     Frequency of Communication with Friends and Family:     Frequency of Social Gatherings with Friends and Family:     Attends Sikhism Services:     Active Member of Clubs or Organizations:     Attends Club or Organization Meetings:     Marital Status:    Intimate Partner Violence:     Fear of Current or Ex-Partner:     Emotionally Abused:  Physically Abused:     Sexually Abused:        Family History:       Problem Relation Age of Onset    Diabetes Father        ROS:  Constitutional: Negative for chills, fatigue, fever, or weight loss. Eyes: Denies reported visual changes. ENT: Denies headache, difficulty swallowing, earache, and nosebleeds. Cardiovascular: Negative for chest pain, palpitations, tachycardia or edema. Respiratory: Denies cough or SOB. GI:The patient denies abdominal or flank pain, anorexia, nausea or vomiting. : see HPI. Musculoskeletal: Patient denies low back pain or painful or reduced range of ROM. Neurological: The patient denies any symptoms of neurological impairment or TIA. Psychiatric: Denies anxiety or depression. Skin: Denies rash or lesions. All remaining ROS negative. PHYSICAL EXAM:  VITALS:  /67   Pulse 74   Temp 97.9 °F (36.6 °C) (Oral)   Resp 18   Ht 6' (1.829 m)   Wt 169 lb 12.8 oz (77 kg)   SpO2 96%   BMI 23.03 kg/m² . Nursing note and vitals reviewed. Constitutional: Alert and oriented times x3, no acute distress, and cooperative to examination with appropriate mood and affect. HEENT:   Head:         Normocephalic and atraumatic. Mouth/Throat:          Mucous membranes are normal.   Eyes:         EOM are normal. No scleral icterus. Nose:    The external appearance of the nose is normal  Ears: The ears appear normal to external inspection. Cardiovascular:       Normal rate, regular rhythm. Pulmonary/Chest:  Normal respiratory rate and rhthym. No use of accessory muscles. Lungs clear bilaterally. Abdominal:          Soft. No tenderness. Active bowel sounds             Genitalia:    Normal circumcised penis, edema noted. Garduno not draining, clot in tubing. Urethral meatus is normal in size and location  Scrotal contents normal to inspection and palpation   Normal testes palpated bilaterally  Musculoskeletal:    Normal range of motion.  He exhibits no edema or tenderness of lower extremities. Extremities:    No cyanosis, clubbing, or edema present. Neurological:    Alert and oriented. DATA:  CBC:   Lab Results   Component Value Date    WBC 9.3 10/14/2021    RBC 3.83 10/14/2021    HGB 12.6 10/14/2021    HCT 38.0 10/14/2021    MCV 99.2 10/14/2021    MCH 32.9 10/14/2021    MCHC 33.2 10/14/2021     10/14/2021    MPV 9.0 10/14/2021     BMP:    Lab Results   Component Value Date     10/14/2021    K 4.3 10/14/2021     10/14/2021    CO2 25 10/14/2021    BUN 13 10/14/2021    CREATININE 1.0 10/14/2021    CALCIUM 9.1 10/14/2021    LABGLOM 71 10/14/2021    GLUCOSE 115 10/14/2021    GLUCOSE 85 05/17/2012     BUN/Creatinine:    Lab Results   Component Value Date    BUN 13 10/14/2021    CREATININE 1.0 10/14/2021     Magnesium:    Lab Results   Component Value Date    MG 2.0 10/14/2021     Phosphorus:  No results found for: PHOS  PT/INR:  No results found for: PROTIME, INR  U/A:    Lab Results   Component Value Date    NITRITE Negative 11/26/2013    COLORU Yellow 10/01/2021    COLORU YELLOW 12/21/2020    PHUR 5.5 12/21/2020    SPECGRAV 1.015 01/13/2016    LEUKOCYTESUR NEGATIVE 12/21/2020    UROBILINOGEN 0.20 10/01/2021    BILIRUBINUR Negative 10/01/2021    BLOODU Large 10/01/2021    BLOODU NEGATIVE 12/21/2020    GLUCOSEU Negative 10/01/2021       Imaging: The patient has had a CT Urogram which I have reviewed along with its accompanying report. The study demonstrates      Postcontrast   Lung bases minimal atelectasis versus scar lateral left lung base. No infiltrates or effusions undersurface of the heart is unremarkable.       Abdomen pelvis   Kidneys enhance symmetrically. Multiple stable left peripelvic cysts. Small parapelvic cyst right kidney. The kidneys excrete contrast symmetrically. Ureters are normal caliber. Contrast is in the dependent portion of the bladder on the delayed images. Bladder fills both right and left posterior bladder diverticula. There is air in the bladder but there is a Garduno catheter in the bladder. Bladder wall enhancing irregular    suggesting chronic cystitis. Radiation seeds are present in the prostate gland.       Liver demonstrates multiple cysts. Largest currently is posterior right lobe measuring 7 cm. The left lobe cyst now measures 5 cm previously 12.5 cm. Smaller partially exophytic right lobe cyst measuring 2 cm stable   Gallbladder is distended. Mild dilatation of the common bile duct measuring 8 mm. No gallstones are seen. No para cholecystic edema. 5 mm low-density lesion tail of pancreas image 28 series 6. Pancreas is otherwise unremarkable. Hyperplasia of the left adrenal gland which is stable. Right adrenal gland is normal. Aorta is nonaneurysmal.   No retroperitoneal or central mesenteric lymphadenopathy. No pelvic or inguinal lymphadenopathy. No bowel obstruction. Uncomplicated diverticulosis is present. Normal appendix is present. MSK   No suspicious bone lesions are seen.           Impression   1. Chronic stable bilateral peripelvic cysts. 2. Bladder has an irregular wall has bladder diverticular lesions which were present on the prior exam. Gas is present in the bladder likely from placement of Garduno catheter. Bladder wall is prominently enhancing and cystitis would be a consideration. 3. No acute abdominal or pelvic abnormalities identified. Numerous chronic findings are detailed in the above report.                 IMPRESSION:     Gross hematuria with clot retention  BPH  Prostate cancer      Plan:      NPO  Discussed with Dr Mo Vicente. With frequent clotting of catheter, recommend proceeding with cystoscopy and clot evacuation today. Pt and wife in agreement. I described the procedure in detail and also described the associated risks and benefits at length. We discussed possible alternative therapies. We discussed the risks and benefits of not undergoing therapy.  Patient understands these risks

## 2021-10-14 NOTE — ED NOTES
RN spoke with Bryon Lagos, urology NP regarding pt. Informed her that pt has had to have 1 B&O suppository and have been putting urojet on tip of penis d/t pt reporting burning sensation. Bryon Lagos states she will place orders.           Yandy Martini RN  10/14/21 4951

## 2021-10-14 NOTE — ED NOTES
Pt provided with turkey sandwich per request. Updated on plan of care. Wife at bedside. Call light in reach.      Helen Lundy RN  10/13/21 8238

## 2021-10-14 NOTE — PROGRESS NOTES
Pt admitted to  5K24 via via cart/stretcher from ED. Complaints: difficulty urinating with ortega. IV none infusing into the antecubital right, condition patent and no rednessl. IV site free of s/s of infection or infiltration. Vital signs obtained. Assessment and data collection initiated. Two nurse skin assessment performed by Rosy Goodpasture and Deb Greenfield RN. Oriented to room. Policies and procedures for 5K explained. All questions answered with no further questions at this time. Fall prevention and safety brochure discussed with patient. Bed alarm on. Call light in reach. Oriented to room. Soledad Smith RN, RN 10/14/2021 1:37 AM     Explained patients right to have family, representative or physician notified of their admission. Patient has Declined for physician to be notified. Patient has Declined for family/representative to be notified. Pt has redness (blanchable) on bottom. Penis is red and pt c/o pain.

## 2021-10-14 NOTE — ANESTHESIA PRE PROCEDURE
Department of Anesthesiology  Preprocedure Note       Name:  Mee Shen   Age:  80 y.o.  :  1935                                          MRN:  [id number]         Date:  10/14/2021      Surgeon: Christy Moreno):  Nicole Hurtado MD    Procedure: Procedure(s):  CYSTOSCOPY EVACUATION OF CLOTS, EVACUATION OF BLADDER STONE, CHANNEL TURP     Medications prior to admission:   Prior to Admission medications    Medication Sig Start Date End Date Taking? Authorizing Provider   tamsulosin (FLOMAX) 0.4 MG capsule Take 1 capsule by mouth daily 10/13/21  Yes MARIKA Torres CNP   Acetaminophen (TYLENOL EXTRA STRENGTH PO) Take by mouth   Yes Historical Provider, MD   lidocaine (XYLOCAINE) 2 % jelly Apply topically as needed. 10/11/21  Yes MARIKA Torres CNP   traZODone (DESYREL) 50 MG tablet 25 to 50 mg.  Nightly  prn sleep 10/11/21  Yes Vineet Goodson MD   amoxicillin-clavulanate (AUGMENTIN) 875-125 MG per tablet Take 1 tablet by mouth 2 times daily for 10 days 10/10/21 10/20/21 Yes MARIKA Miles CNP   potassium chloride (KLOR-CON M) 20 MEQ extended release tablet Take 1 tablet by mouth daily 6/15/21  Yes Vineet Goodson MD   Calcium Carb-Cholecalciferol (CALCIUM 1000 + D PO) Take 1 tablet by mouth daily   Yes Historical Provider, MD   amLODIPine (NORVASC) 10 MG tablet TAKE 1 TABLET EVERY DAY 3/5/21  Yes Vineet Goodson MD   lisinopril (PRINIVIL;ZESTRIL) 10 MG tablet TAKE 1 TABLET TWICE DAILY 3/5/21  Yes Vineet Goodson MD   aspirin 81 MG EC tablet Take 81 mg by mouth daily   Yes Historical Provider, MD   Apoaequorin (PREVAGEN) 10 MG CAPS Take 10 mg by mouth daily   Yes Historical Provider, MD   MULTIPLE VITAMIN PO Take by mouth daily   Yes Historical Provider, MD   UNABLE TO FIND Kefir 8 0z daily    Historical Provider, MD       Current medications:    Current Facility-Administered Medications   Medication Dose Route Frequency Provider Last Rate Last Admin    amLODIPine ([address]) tablet 10 mg  10 mg Oral Daily Melody Valderrama MD   10 mg at 10/14/21 2835    lisinopril (PRINIVIL;ZESTRIL) tablet 10 mg  10 mg Oral Daily Melody Valderrama MD   10 mg at 10/14/21 0906    tamsulosin (FLOMAX) capsule 0.4 mg  0.4 mg Oral Daily Melody Valderrama MD   0.4 mg at 10/14/21 2945    traZODone (DESYREL) tablet 25 mg  25 mg Oral Nightly PRN Melody Valderrama MD        sodium chloride flush 0.9 % injection 10 mL  10 mL IntraVENous 2 times per day Melody Valderrama MD   10 mL at 10/14/21 0908    sodium chloride flush 0.9 % injection 10 mL  10 mL IntraVENous PRN Melody Valderrama MD        0.9 % sodium chloride infusion  25 mL IntraVENous PRN Melody Valderrama MD        ondansetron (ZOFRAN-ODT) disintegrating tablet 4 mg  4 mg Oral Q8H PRN Melody Valderrama MD        Or    ondansetron [facility]) injection 4 mg  4 mg IntraVENous Q6H PRN Melody Valderrama MD        polyethylene glycol [facility]) packet 17 g  17 g Oral Daily PRN Melody Valderrama MD        acetaminophen (TYLENOL) tablet 650 mg  650 mg Oral Q6H PRN Melody Valderrama MD        Or    acetaminophen (TYLENOL) suppository 650 mg  650 mg Rectal Q6H PRN Melody Valderrama MD        potassium chloride 10 mEq/100 mL IVPB (Peripheral Line)  10 mEq IntraVENous PRN Melody Valderrama MD        magnesium sulfate 2000 mg in 50 mL IVPB premix  2,000 mg IntraVENous PRN Melody Valderrama MD        aluminum & magnesium hydroxide-simethicone (MAALOX) 200-200-20 MG/5ML suspension 30 mL  30 mL Oral Q6H PRN Melody Valderrama MD        oxybutynin [facility]) tablet 5 mg  5 mg Oral Q8H PRN MARIKA Snell - CNP        lidocaine (XYLOCAINE) 2 % uro-jet   Topical PRN MARIKA Snell - CNP   Given at 10/14/21 2177    cefTRIAXone (ROCEPHIN) 1000 mg IVPB in 50 mL D5W minibag  1,000 mg IntraVENous Q24H Kenisha Mccarty, APRN - CNP   Stopped at 10/14/21 0346    opium-belladonna (B&O SUPPRETTES) 16.2-30 MG suppository 30 mg  30 mg Rectal Q8H PRN Donnamaria Fairly, APRN - CNP         Facility-Administered Medications Ordered in Other Encounters   Medication Dose Route Frequency Provider Last Rate Last Admin    dexamethasone (PF) (DECADRON) injection   IntraVENous PRN Mickeal , APRN - CRNA   10 mg at 10/14/21 1335    propofol injection   IntraVENous PRN Mickeal , APRN - CRNA   150 mg at 10/14/21 1330    lidocaine injection   IntraVENous PRN Mickeal , APRN - CRNA   100 mg at 10/14/21 1330    ondansetron (ZOFRAN) injection   IntraVENous PRN Mickeal , APRN - CRNA   4 mg at 10/14/21 1330    ceFAZolin (ANCEF) injection   IntraVENous PRN Mickeal , APRN - CRNA   2,000 mg at 10/14/21 1335    0.9 % sodium chloride infusion   IntraVENous Continuous PRN Mickeal , APRN - CRNA   New Bag at 10/14/21 1325    phenylephrine (VAZCULEP) injection   IntraVENous PRN Mickeal , APRN - CRNA   200 mcg at 10/14/21 1344    glycopyrrolate (ROBINUL) injection   IntraVENous PRN Mickeal , APRN - CRNA   0.4 mg at 10/14/21 1342    fentaNYL (SUBLIMAZE) injection   IntraVENous PRN Mickeal , APRN - CRNA   25 mcg at 10/14/21 1401       Allergies:  No Known Allergies    Problem List:    Patient Active Problem List   Diagnosis Code    Hyperlipidemia E78.5    Hypertension I10    History of prostate cancer Z85.46    Encounter for colonoscopy due to history of adenomatous colonic polyps Z12.11, Z86.010    Lateral rectus palsy, 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    Hematuria R31.9       Past Medical History:        Diagnosis Date    Hyperlipidemia     Hypertension     Prostate cancer [facility])        Past Surgical History:        Procedure Laterality Date    COLONOSCOPY      HEMORRHOID SURGERY  1970    HERNIA REPAIR      ID COLONOSCOPY FLX DX W/COLLJ SPEC WHEN PFRMD Left 6/11/2018    COLONOSCOPY performed by Davina Carr MD at [place] [address]  2005    seed implant   [facility] History:    Social History     Tobacco Use    Smoking status: Former Smoker     Quit date: 1983     Years since quittin.9    Smokeless tobacco: Never Used   Substance Use Topics    Alcohol use: No                                Counseling given: Not Answered      Vital Signs (Current):   Vitals:    10/14/21 0130 10/14/21 0315 10/14/21 0830 10/14/21 1130   BP: (!) 156/88 (!) 150/73 127/64 (!) 154/73   Pulse: 86 84 80 84   Resp: 16 18 17 18   Temp: 97.6 °F (36.4 °C) 99.2 °F (37.3 °C) 97.8 °F (36.6 °C) 98.3 °F (36.8 °C)   TempSrc: Oral Oral Oral Oral   SpO2: 95% 95% 93% 98%   Weight: 169 lb 12.8 oz (77 kg)      Height:                                                  BP Readings from Last 3 Encounters:   10/14/21 (!) 154/73   10/14/21 (!) 99/54   10/12/21 118/76       NPO Status:                                                                                 BMI:   Wt Readings from Last 3 Encounters:   10/14/21 169 lb 12.8 oz (77 kg)   10/12/21 172 lb (78 kg)   10/01/21 178 lb 3.2 oz (80.8 kg)     Body mass index is 23.03 kg/m². CBC:   Lab Results   Component Value Date    WBC 9.3 10/14/2021    RBC 3.83 10/14/2021    HGB 12.6 10/14/2021    HCT 38.0 10/14/2021    MCV 99.2 10/14/2021     10/14/2021       CMP:   Lab Results   Component Value Date     10/14/2021    K 4.3 10/14/2021     10/14/2021    CO2 25 10/14/2021    BUN 13 10/14/2021    CREATININE 1.0 10/14/2021    LABGLOM 71 10/14/2021    GLUCOSE 115 10/14/2021    GLUCOSE 85 2012    PROT 7.6 2018    CALCIUM 9.1 10/14/2021    BILITOT 0.5 2018    ALKPHOS 125 2018    AST 29 2018    ALT 31 2018       POC Tests: No results for input(s): POCGLU, POCNA, POCK, POCCL, POCBUN, POCHEMO, POCHCT in the last 72 hours.     Coags: No results found for: PROTIME, INR, APTT    HCG (If Applicable): No results found for: PREGTESTUR, PREGSERUM, HCG, HCGQUANT     ABGs: No results found for: PHART, PO2ART, IKM2NUQ, WPS2RGW, BEART, [id number]     Type & Screen (If Applicable):  No results found for: LABABO, LABRH    Drug/Infectious Status (If Applicable):  No results found for: HIV, HEPCAB    COVID-19 Screening (If Applicable): No results found for: COVID19        Anesthesia Evaluation   no history of anesthetic complications:   Airway: Mallampati: II  TM distance: >3 FB   Neck ROM: full  Mouth opening: > = 3 FB Dental:          Pulmonary:normal exam        (-) COPD and asthma                           Cardiovascular:Negative CV ROS  Exercise tolerance: good (>4 METS),   (+) hypertension:, hyperlipidemia    (-) past MI and CAD                Neuro/Psych:      (-) seizures and CVA           GI/Hepatic/Renal:        (-) GERD, liver disease and no renal disease       Endo/Other:        (-) diabetes mellitus, hypothyroidism, hyperthyroidism               Abdominal:             Vascular:     - DVT. Other Findings:             Anesthesia Plan      general     ASA 2     (PIV. Additional access can be obtained after induction if needed. Standard ASA monitors. IV/PO opioids and other adjuncts as needed for pain control. PACU post op for recovery. Possible anesthetics complications were discussed with the patient, including but not limited to: PONV, damage to the airway and surrounding structures (teeth, lips, gums, tongue, etc.), adverse reactions to medicine, cardiac complications (MI, CHF, arrhythmias, etc.), respiratory complications (post-op ventilation, respiratory failure, etc.), neurologic complications (nerve damage, stroke, seizure), and death. The patient was given the opportunity to ask questions and all questions were answered to the patient's satisfaction. The patient is in agreement with the anesthetic plan.  )  Induction: intravenous. Anesthetic plan and risks discussed with patient. Plan discussed with CRNA.                   Nayan Keen DO   10/14/2021

## 2021-10-15 ENCOUNTER — TELEPHONE (OUTPATIENT)
Dept: UROLOGY | Age: 86
End: 2021-10-15

## 2021-10-15 VITALS
RESPIRATION RATE: 18 BRPM | OXYGEN SATURATION: 96 % | HEIGHT: 72 IN | SYSTOLIC BLOOD PRESSURE: 140 MMHG | BODY MASS INDEX: 23 KG/M2 | TEMPERATURE: 98 F | WEIGHT: 169.8 LBS | DIASTOLIC BLOOD PRESSURE: 79 MMHG | HEART RATE: 80 BPM

## 2021-10-15 PROCEDURE — 99238 HOSP IP/OBS DSCHRG MGMT 30/<: CPT | Performed by: INTERNAL MEDICINE

## 2021-10-15 PROCEDURE — 6360000002 HC RX W HCPCS: Performed by: NURSE PRACTITIONER

## 2021-10-15 PROCEDURE — 6370000000 HC RX 637 (ALT 250 FOR IP): Performed by: PEDIATRICS

## 2021-10-15 PROCEDURE — 51798 US URINE CAPACITY MEASURE: CPT

## 2021-10-15 PROCEDURE — 2580000003 HC RX 258: Performed by: PEDIATRICS

## 2021-10-15 PROCEDURE — 2580000003 HC RX 258: Performed by: NURSE PRACTITIONER

## 2021-10-15 PROCEDURE — 6370000000 HC RX 637 (ALT 250 FOR IP): Performed by: INTERNAL MEDICINE

## 2021-10-15 PROCEDURE — 99232 SBSQ HOSP IP/OBS MODERATE 35: CPT | Performed by: UROLOGY

## 2021-10-15 RX ORDER — AMOXICILLIN 500 MG/1
500 CAPSULE ORAL EVERY 8 HOURS SCHEDULED
Qty: 15 CAPSULE | Refills: 0 | Status: SHIPPED | OUTPATIENT
Start: 2021-10-15 | End: 2021-10-20

## 2021-10-15 RX ORDER — AMOXICILLIN 500 MG/1
500 CAPSULE ORAL EVERY 8 HOURS SCHEDULED
Status: DISCONTINUED | OUTPATIENT
Start: 2021-10-15 | End: 2021-10-15 | Stop reason: HOSPADM

## 2021-10-15 RX ADMIN — TAMSULOSIN HYDROCHLORIDE 0.4 MG: 0.4 CAPSULE ORAL at 08:56

## 2021-10-15 RX ADMIN — CEFTRIAXONE SODIUM 1000 MG: 1 INJECTION, POWDER, FOR SOLUTION INTRAMUSCULAR; INTRAVENOUS at 00:30

## 2021-10-15 RX ADMIN — AMOXICILLIN 500 MG: 500 CAPSULE ORAL at 18:26

## 2021-10-15 RX ADMIN — LISINOPRIL 10 MG: 10 TABLET ORAL at 08:56

## 2021-10-15 RX ADMIN — AMLODIPINE BESYLATE 10 MG: 10 TABLET ORAL at 08:56

## 2021-10-15 RX ADMIN — SODIUM CHLORIDE, PRESERVATIVE FREE 10 ML: 5 INJECTION INTRAVENOUS at 08:57

## 2021-10-15 RX ADMIN — AMOXICILLIN 500 MG: 500 CAPSULE ORAL at 09:00

## 2021-10-15 ASSESSMENT — PAIN SCALES - GENERAL
PAINLEVEL_OUTOF10: 0
PAINLEVEL_OUTOF10: 0

## 2021-10-15 NOTE — PROGRESS NOTES
MARIKA Zimmer - CNP  Urology Progress Note    Subjective: Karen Jeff is a 80 y.o. male. s/p CYSTOSCOPY EVACUATION OF CLOTS, EVACUATION OF BLADDER STONE, CHANNEL TURP  on 10/13/2021 - 10/14/2021    His/Her current Diet is: ADULT DIET; Regular. Since the previous note, the patient reports the following:  No acute issues overnight. No fevers or chills. No nausea or vomiting. No chest pain or shortness of breath. No calf pain. Pain Controlled. Ambulating. Tolerating PO Diet. CBI clear at slow rate, will clamp and evaluate. Possible VT this afternoon    Vitals and Labs:  Patient Vitals for the past 24 hrs:   BP Temp Temp src Pulse Resp SpO2   10/15/21 0849 (!) 154/72 97.6 °F (36.4 °C) Oral 73 18 97 %   10/15/21 0427 (!) 142/67 97.6 °F (36.4 °C) Oral 80 18 96 %   10/14/21 2030 118/61 97.7 °F (36.5 °C) Oral 72 18 94 %   10/14/21 1623 129/67 -- -- 74 18 96 %   10/14/21 1600 (!) 146/68 -- -- 89 18 94 %   10/14/21 1532 (!) 148/67 97.9 °F (36.6 °C) Oral 90 18 96 %   10/14/21 1500 (!) 147/70 -- -- 93 21 96 %   10/14/21 1455 (!) 151/73 -- -- 96 20 95 %   10/14/21 1450 (!) 153/74 -- -- 97 21 96 %   10/14/21 1445 (!) 154/76 -- -- 100 25 95 %   10/14/21 1440 (!) 159/73 -- -- 100 19 97 %   10/14/21 1435 (!) 155/76 -- -- 100 18 96 %   10/14/21 1430 (!) 157/75 -- -- 101 29 98 %   10/14/21 1425 (!) 151/74 -- -- 101 24 98 %   10/14/21 1420 (!) 159/74 -- -- 100 21 94 %   10/14/21 1415 132/69 -- -- 94 20 96 %   10/14/21 1412 132/69 96.9 °F (36.1 °C) Temporal 100 19 96 %   10/14/21 1130 (!) 154/73 98.3 °F (36.8 °C) Oral 84 18 98 %     I/O last 3 completed shifts: In: 9648 [P.O.:600;  I.V.:497]  Out: 3525 [OPWCR:2602]    Recent Labs     10/13/21  1905 10/14/21  0551   WBC 9.6 9.3   HGB 12.6* 12.6*   HCT 37.3* 38.0*   MCV 96.9* 99.2*   * 418*     Recent Labs     10/13/21  1905 10/14/21  0551   * 138   K 4.1 4.3   CL 92* 103   CO2 23 25   BUN 17 13   CREATININE 1.0 1.0       No results for chronic cystitis. Radiation seeds are present in the prostate gland. Liver demonstrates multiple cysts. Largest currently is posterior right lobe measuring 7 cm. The left lobe cyst now measures 5 cm previously 12.5 cm. Smaller partially exophytic right lobe cyst measuring 2 cm stable Gallbladder is distended. Mild dilatation of the common bile duct measuring 8 mm. No gallstones are seen. No para cholecystic edema. 5 mm low-density lesion tail of pancreas image 28 series 6. Pancreas is otherwise unremarkable. Hyperplasia of the left adrenal gland which is stable. Right adrenal gland is normal. Aorta is nonaneurysmal. No retroperitoneal or central mesenteric lymphadenopathy. No pelvic or inguinal lymphadenopathy. No bowel obstruction. Uncomplicated diverticulosis is present. Normal appendix is present. MSK No suspicious bone lesions are seen. 1. Chronic stable bilateral peripelvic cysts. 2. Bladder has an irregular wall has bladder diverticular lesions which were present on the prior exam. Gas is present in the bladder likely from placement of Garduno catheter. Bladder wall is prominently enhancing and cystitis would be a consideration. 3. No acute abdominal or pelvic abnormalities identified. Numerous chronic findings are detailed in the above report. **This report has been created using voice recognition software. It may contain minor errors which are inherent in voice recognition technology. ** Final report electronically signed by Dr. Temitope Hill on 10/14/2021 11:41 AM      Impression:    Patient Active Problem List   Diagnosis    Hyperlipidemia    Hypertension    History of prostate cancer    Encounter for colonoscopy due to history of adenomatous colonic polyps    Lateral rectus palsy, left    Hematuria       s/p CYSTOSCOPY EVACUATION OF CLOTS, EVACUATION OF BLADDER STONE, CHANNEL TURP  on 10/13/2021 - 10/14/2021    Impression/Plan:  Orlene Annas hematuria with clot retention - CBI slow rate, will clamp. Possible VT this afternoon  BPH - on Flomax  Prostate cancer  - Treated with IMRT and brachytherapy in May of 2005.  Most recent PSA 2015, was undetectable       Torrence Gottron, APRN - CNP  10:46 AM 10/15/2021    Addendum 1230  Urine is yellow  Ok to remove ortega for void trial  Ok for discharge once voids

## 2021-10-15 NOTE — CARE COORDINATION
Patient is planned for discharge today after he voids. 10/15/21, 2:49 PM EDT    Patient goals/plan/ treatment preferences discussed by  and . Patient goals/plan/ treatment preferences reviewed with patient/ family. Patient/ family verbalize understanding of discharge plan and are in agreement with goal/plan/treatment preferences. Understanding was demonstrated using the teach back method. AVS provided by RN at time of discharge, which includes all necessary medical information pertaining to the patients current course of illness, treatment, post-discharge goals of care, and treatment preferences.         IMM Letter  IMM Letter given to Patient/Family/Significant other/Guardian/POA/by[de-identified] staff  IMM Letter date given[de-identified] 10/13/21  IMM Letter time given[de-identified] 8997

## 2021-10-15 NOTE — DISCHARGE SUMMARY
Discharge Summary    Patient:  Colt Pearson  YOB: 1935    MRN: [id number]   Acct: [de-identified]    Primary Care Physician: Ángel Boykin MD    Admit date:  10/13/2021    Discharge date:  10/15/2021 10.15.2021      Discharge Diagnoses:   Acute urinary retention  Principal Problem:    Acute urinary retention  Active Problems:    Hyperlipidemia    Hypertension    History of prostate cancer    Hematuria    Acute cystitis with hematuria  Resolved Problems:    * No resolved hospital problems. *        Admitted for: (HPI) acute urinary retention    Hospital Course: this is a nice gentleman referred to Urology as an outpt for urinary retention and a ortega was inserted. He developed blood clots and his ortega obstructed; he was directed to ER where CBI was initiated and he was admitted. He continued CBI in hospital until clear. He grew Proteus from the urine, the same organism as outpt. He was continued on ampicillin. He underwent cystoscopy (report not available). His ortega was removed prior to discharge and he was able to void with little residual.  He was discharged and will follow up with Urology. Consultants:  Urology    Discharge Medications:       Medication List      START taking these medications    amoxicillin 500 MG capsule  Commonly known as: AMOXIL  Take 1 capsule by mouth every 8 hours for 5 days        CONTINUE taking these medications    amLODIPine 10 MG tablet  Commonly known as: NORVASC  TAKE 1 TABLET EVERY DAY     CALCIUM 1000 + D PO     lidocaine 2 % jelly  Commonly known as: XYLOCAINE  Apply topically as needed. lisinopril 10 MG tablet  Commonly known as: PRINIVIL;ZESTRIL  TAKE 1 TABLET TWICE DAILY     MULTIPLE VITAMIN PO     Prevagen 10 MG Caps  Generic drug: Apoaequorin     tamsulosin 0.4 MG capsule  Commonly known as: FLOMAX  Take 1 capsule by mouth daily     traZODone 50 MG tablet  Commonly known as: DESYREL  25 to 50 mg.  Nightly  prn sleep     TYLENOL EXTRA STRENGTH PO     UNABLE TO FIND        STOP taking these medications    amoxicillin-clavulanate 875-125 MG per tablet  Commonly known as: AUGMENTIN     aspirin 81 MG EC tablet     potassium chloride 20 MEQ extended release tablet  Commonly known as: KLOR-CON M           Where to Get Your Medications      These medications were sent [facility] [address] [facility] [place] [facility] [address]    Phone: [phone]   · amoxicillin 500 MG capsule           Physical Exam:    Vitals:  Vitals:    10/14/21 2030 10/15/21 0427 10/15/21 0849 10/15/21 1733   BP: 118/61 (!) 142/67 (!) 154/72 (!) 140/79   Pulse: 72 80 73 80   Resp: 18 18 18 18   Temp: 97.7 °F (36.5 °C) 97.6 °F (36.4 °C) 97.6 °F (36.4 °C) 98 °F (36.7 °C)   TempSrc: Oral Oral Oral Oral   SpO2: 94% 96% 97% 96%   Weight:       Height:         Weight: Weight: 169 lb 12.8 oz (77 kg)     24 hour intake/output:    Intake/Output Summary (Last 24 hours) at 10/16/2021 1424  Last data filed at 10/15/2021 1735  Gross per 24 hour   Intake --   Output 400 ml   Net -400 ml       General appearance - alert, well appearing, and in no distress  Chest - clear to auscultation, no wheezes, rales or rhonchi, symmetric air entry  Heart - normal rate, regular rhythm, normal S1, S2, no murmurs, rubs, clicks or gallops  Abdomen - soft, nontender, nondistended, no masses or organomegaly  Obese: No; Protuberant: No   Neurological - alert, oriented, normal speech, no focal findings or movement disorder noted  Extremities - peripheral pulses normal, no pedal edema, no clubbing or cyanosis  Skin - normal coloration and turgor, no rashes, no suspicious skin lesions noted    Procedures:    cystoscopy      Labs can be found in the Lab tab of Chart Review    Diet:  No diet orders on file    Activity:  Activity as tolerated (Patient may move about with assist as indicated or with supervision.)    Follow-up:  in the next few weeks with Kathia Castellanos MD,  in 2 weeks with Urology    Disposition: home    Condition: Stable      Time Spent: 25 minutes    Electronically signed by Kathia Castellanos MD on 10/16/2021 at 2:24 PM    Discharging Hospitalist

## 2021-10-15 NOTE — PROCEDURES
Bladder scan was completed by ELADIO Scanlon at 1710. The residual amount of 126ml was resulted to [employer].

## 2021-10-16 PROBLEM — N30.01 ACUTE CYSTITIS WITH HEMATURIA: Status: ACTIVE | Noted: 2021-10-16

## 2021-10-16 PROBLEM — R33.8 ACUTE URINARY RETENTION: Status: ACTIVE | Noted: 2021-10-16

## 2021-10-18 ENCOUNTER — TELEPHONE (OUTPATIENT)
Dept: INTERNAL MEDICINE CLINIC | Age: 86
End: 2021-10-18

## 2021-10-18 NOTE — TELEPHONE ENCOUNTER
----- Message from [facility] (DANNIE PEÑA) sent at 10/18/2021 10:19 AM EDT -----  Subject: Hospital Follow Up    QUESTIONS  What hospital was the Patient Discharged from? [facility]  Date of Discharge? 2021-10-15  Discharge Location? Home  Reason for hospitalization as patient stated? UTI   What question does the patient have, if applicable?   ---------------------------------------------------------------------------  --------------  CALL BACK INFO  What is the best way for the office to contact you? OK to leave message on   voicemail  Preferred Call Back Phone Number? [phone]  ---------------------------------------------------------------------------  --------------  SCRIPT ANSWERS  Relationship to Patient? Self  (Patient requests to see provider urgently. )? No  (Has the patient been discharged from the hospital within 2 business days   AND does not have a Telephone Encounter  Follow Up From [address] [place] [address]   documented in [address]?)? Yes  Do you have any questions for your primary care provider that need to be   answered prior to your appointment? (Use RN Triage if question pertains to   anything on the red flag list)? No  (Patient needs follow up visit after hospital discharge) Book first   available appointment within 7 days OF DISCHARGE, if no appt, proceed to   book the next available time slot within 14 days OF DISCHARGE AND Send   Message to Provider. [address] Follow Up appointment cannot be booked   beyond 14 Days and should result in a Message to Provider. ?  Yes

## 2021-10-20 NOTE — OP NOTE
Operative Note      Patient: Hardik Becker  YOB: 1935  MRN: [id number]    Date of Procedure: 10/14/2021    Pre-Op Diagnosis: gross hematuria    Post-Op Diagnosis: Same       Procedure(s):  CYSTOSCOPY EVACUATION OF CLOTS, EVACUATION OF BLADDER STONE, CHANNEL TURP     Surgeon(s):  Adriano Dodd MD    Assistant:   * No surgical staff found *    Anesthesia: General    Estimated Blood Loss (mL): Minimal    Complications: None    Specimens:   ID Type Source Tests Collected by Time Destination   A : PROSTATE TISSUE Tissue Prostate SURGICAL PATHOLOGY Adriano Dodd MD 10/14/2021 1403        Implants:  * No implants in log *      Drains:   [REMOVED] Urethral Catheter Triple-lumen 20 fr (Removed)   Catheter Indications Urinary retention (acute or chronic), continuous bladder irrigation or bladder outlet obstruction 10/13/21 2048   Site Assessment Swelling;Red 10/13/21 2048   Urine Color Pink 10/14/21 0907   Urine Appearance Other (Comment) 10/14/21 0907   Output (mL) 1100 mL 10/14/21 0907       [REMOVED] Urethral Catheter Triple-lumen 22 fr (Removed)   Catheter Indications Perioperative use for selected surgical procedures 10/14/21 1503   Site Assessment Red; Swelling 10/14/21 1503   Urine Color Other (Comment) 10/14/21 1503   Urine Appearance Clear 10/14/21 1503   Output (mL) 1200 mL 10/14/21 1413       Findings: Moderate to severe trabeculations, bladder stone, somewhat obstructing prostate    Detailed Description of Procedure:   Patient was brought back to the operating room laid in the supine position general anesthesia was inducted. Timeout was performed. I placed a cystoscope in the patient's urethra into the bladder. I used a catheter tip syringe with the scope to remove all bladder clots. There is no obvious tumor within the bladder. There is some bladder stones at the floor the bladder this was evacuated and removed.   I then switched to the resectoscope and performed a channel resection of the prostatic urethra to open up his urethra as there is some degree of obstruction. Hemostasis was obtained. There is no active bleeders at the end of the case. Bladder chips were evacuated and sent for pathology. Three-way Garduno catheter was replaced and irrigation was started. Plan is to wean the irrigation off and remove the catheter and do a voiding trial prior to discharge.     Electronically signed by Danna Naranjo M.D, MD on 10/20/2021 at 5:43 PM

## 2021-10-21 ASSESSMENT — ENCOUNTER SYMPTOMS
ABDOMINAL PAIN: 0
EYE REDNESS: 0
COUGH: 0
BACK PAIN: 0
RHINORRHEA: 0
VOMITING: 0
NAUSEA: 0
CHEST TIGHTNESS: 0

## 2021-10-21 NOTE — ED PROVIDER NOTES
[facility] Emergency Department    CHIEF COMPLAINT       Chief Complaint   Patient presents with    Hematuria       Nurses Notes reviewed and I agree except as noted in the HPI. HISTORY OF PRESENT ILLNESS    Juan Meza bee 80 y.o. male who presents to the ED for evaluation of hematuria. The patient went to urology yesterday due to inability to urinate 2/2 swelling of the penis. He has a ortega in. Today he again couldn't get anything out so he went to the office. They flushed his catheter and it was clogged with clots. They sent the patient here for CBI. Patient is feeling a little better since being flushed. HPI was provided by the patient    REVIEW OF SYSTEMS     Review of Systems   Constitutional: Negative for chills, fatigue and fever. HENT: Negative for congestion, ear discharge, ear pain, postnasal drip and rhinorrhea. Eyes: Negative for redness. Respiratory: Negative for cough and chest tightness. Cardiovascular: Negative for chest pain and leg swelling. Gastrointestinal: Negative for abdominal pain, nausea and vomiting. Genitourinary: Positive for difficulty urinating and penile swelling. Negative for dysuria, enuresis, flank pain and hematuria. Musculoskeletal: Negative for back pain and joint swelling. Skin: Negative for rash. Neurological: Negative for dizziness, light-headedness, numbness and headaches. Psychiatric/Behavioral: Negative for agitation, behavioral problems and confusion. All other systems negative except as noted. PAST MEDICAL HISTORY     Past Medical History:   Diagnosis Date    Hyperlipidemia     Hypertension     Prostate cancer ([facility] [address]        SURGICALHISTORY      has a past surgical history that includes hernia repair; Hemorrhoid surgery (1970); Prostate surgery (2005); Tonsillectomy; Colonoscopy; pr colonoscopy flx dx w/collj spec when pfrmd (Left, 6/11/2018); and Cystoscopy (Left, 10/14/2021).     CURRENT MEDICATIONS Discharge Medication List as of 10/15/2021  6:20 PM      CONTINUE these medications which have NOT CHANGED    Details   tamsulosin (FLOMAX) 0.4 MG capsule Take 1 capsule by mouth daily, Disp-90 capsule, R-3Normal      Acetaminophen (TYLENOL EXTRA STRENGTH PO) Take by mouthHistorical Med      lidocaine (XYLOCAINE) 2 % jelly Apply topically as needed. , Disp-1 each, R-0, Normal      traZODone (DESYREL) 50 MG tablet 25 to 50 mg. Nightly  prn sleep, Disp-15 tablet, R-0Normal      Calcium Carb-Cholecalciferol (CALCIUM 1000 + D PO) Take 1 tablet by mouth dailyHistorical Med      amLODIPine (NORVASC) 10 MG tablet TAKE 1 TABLET EVERY DAY, Disp-90 tablet, R-3Normal      lisinopril (PRINIVIL;ZESTRIL) 10 MG tablet TAKE 1 TABLET TWICE DAILY, Disp-180 tablet, R-3Normal      Apoaequorin (PREVAGEN) 10 MG CAPS Take 10 mg by mouth dailyHistorical Med      UNABLE TO FIND Kefir 8 0z dailyHistorical Med      MULTIPLE VITAMIN PO Take by mouth dailyHistorical Med             ALLERGIES     has No Known Allergies. FAMILY HISTORY     He indicated that his mother is . He indicated that his father is . family history includes Diabetes in his father.     SOCIAL HISTORY       Social History     Socioeconomic History    Marital status:      Spouse name: Not on file    Number of children: Not on file    Years of education: Not on file    Highest education level: Not on file   Occupational History    Not on file   Tobacco Use    Smoking status: Former Smoker     Quit date: 1983     Years since quittin.9    Smokeless tobacco: Never Used   Vaping Use    Vaping Use: Never used   Substance and Sexual Activity    Alcohol use: No    Drug use: No    Sexual activity: Not on file   Other Topics Concern    Not on file   Social History Narrative    Not on file     Social Determinants of Health     Financial Resource Strain:     Difficulty of Paying Living Expenses:    Food Insecurity:     Worried About Running Out of Food in the Last Year:    [address] in the Last Year:    Transportation Needs:     Lack of Transportation (Medical):  Lack of Transportation (Non-Medical):    Physical Activity:     Days of Exercise per Week:     Minutes of Exercise per Session:    Stress:     Feeling of Stress :    Social Connections:     Frequency of Communication with Friends and Family:     Frequency of Social Gatherings with Friends and Family:     Attends Sikh Services:     Active Member of Clubs or Organizations:     Attends Club or Organization Meetings:     Marital Status:    Intimate Partner Violence:     Fear of Current or Ex-Partner:     Emotionally Abused:     Physically Abused:     Sexually Abused:        PHYSICAL EXAM     INITIAL VITALS:  height is 6' (1.829 m) and weight is 169 lb 12.8 oz (77 kg). His oral temperature is 98 °F (36.7 °C). His blood pressure is 140/79 (abnormal) and his pulse is 80. His respiration is 18 and oxygen saturation is 96%. Physical Exam  Constitutional:       Appearance: Normal appearance. He is well-developed. He is not ill-appearing. HENT:      Head: Normocephalic and atraumatic. Nose: Nose normal.      Mouth/Throat:      Mouth: Mucous membranes are moist.      Pharynx: Oropharynx is clear. Eyes:      Conjunctiva/sclera: Conjunctivae normal.   Cardiovascular:      Rate and Rhythm: Normal rate. Pulses: Normal pulses. Pulmonary:      Effort: Pulmonary effort is normal.   Abdominal:      Palpations: Abdomen is soft. Genitourinary:     Comments: Garduno catheter in place draining pink urine  Musculoskeletal:         General: Normal range of motion. Cervical back: Normal range of motion. Skin:     General: Skin is warm and dry. Capillary Refill: Capillary refill takes less than 2 seconds. Neurological:      General: No focal deficit present. Mental Status: He is alert and oriented to person, place, and time.    Psychiatric: Behavior: Behavior normal.         DIFFERENTIAL DIAGNOSIS:   Urinary retention, hematuria, cystitis    DIAGNOSTIC RESULTS     EKG: All EKG's are interpreted by the Emergency Department Physician who eithersigns or Co-signs this chart in the absence of a cardiologist.        RADIOLOGY: non-plainfilm images(s) such as CT, Ultrasound and MRI are read by the radiologist.  Plain radiographic images are visualized and preliminarily interpreted by the emergency physician unless otherwise stated below.         LABS:   Labs Reviewed   CBC WITH AUTO DIFFERENTIAL - Abnormal; Notable for the following components:       Result Value    RBC 3.85 (*)     Hemoglobin 12.6 (*)     Hematocrit 37.3 (*)     MCV 96.9 (*)     RDW-SD 46.9 (*)     Platelets 857 (*)     MPV 9.0 (*)     All other components within normal limits   BASIC METABOLIC PANEL - Abnormal; Notable for the following components:    Sodium 130 (*)     Chloride 92 (*)     Glucose 109 (*)     All other components within normal limits   GLOMERULAR FILTRATION RATE, ESTIMATED - Abnormal; Notable for the following components:    Est, Glom Filt Rate 71 (*)     All other components within normal limits   OSMOLALITY - Abnormal; Notable for the following components:    Osmolality Calc 262.9 (*)     All other components within normal limits   BASIC METABOLIC PANEL - Abnormal; Notable for the following components:    Glucose 115 (*)     All other components within normal limits   CBC WITH AUTO DIFFERENTIAL - Abnormal; Notable for the following components:    RBC 3.83 (*)     Hemoglobin 12.6 (*)     Hematocrit 38.0 (*)     MCV 99.2 (*)     RDW-SD 48.2 (*)     Platelets 331 (*)     MPV 9.0 (*)     Lymphocytes Absolute 0.9 (*)     All other components within normal limits   GLOMERULAR FILTRATION RATE, ESTIMATED - Abnormal; Notable for the following components:    Est, Glom Filt Rate 71 (*)     All other components within normal limits   URINE RT REFLEX TO CULTURE       EMERGENCY DEPARTMENT COURSE:   Vitals:    Vitals:    10/14/21 2030 10/15/21 0427 10/15/21 0849 10/15/21 1733   BP: 118/61 (!) 142/67 (!) 154/72 (!) 140/79   Pulse: 72 80 73 80   Resp: 18 18 18 18   Temp: 97.7 °F (36.5 °C) 97.6 °F (36.4 °C) 97.6 °F (36.4 °C) 98 °F (36.7 °C)   TempSrc: Oral Oral Oral Oral   SpO2: 94% 96% 97% 96%   Weight:       Height:                                      MDM    Patient was seen in the ER for urinary retention 2/2 hematuria. Appropriate testing is ordered and reviewed. Patient has a three way catheter placed and CBI is initiated. I discussed with DR. Yue Burr and he agrees to admit. Medications   oxyCODONE-acetaminophen (PERCOCET) 5-325 MG per tablet 1 tablet (has no administration in time range)   ondansetron (ZOFRAN) injection 4 mg (has no administration in time range)   promethazine (PHENERGAN) injection 6.25 mg (has no administration in time range)   diphenhydrAMINE (BENADRYL) injection 12.5 mg (has no administration in time range)   opium-belladonna (B&O SUPPRETTES) 16.2-30 MG suppository 30 mg (30 mg Rectal Given 10/13/21 6114)   iopamidol (ISOVUE-370) 76 % injection 80 mL (80 mLs IntraVENous Given 10/14/21 1057)         Patient was seen independently by myself. The patient's final impression and disposition and plan was determined by myself. Strict return precautions and follow up instructions were discussed with the patient prior to discharge, with which the patient agrees. Physical assessment findings, diagnostic testing(s) if applicable, and vital signs reviewed with patient/patient representative. Questions answered. Medications asdirected, including OTC medications for supportive care. Education provided on medications. Differential diagnosis(s) discussed with patient/patient representative. Home care/self care instructions reviewed withpatient/patient representative. Patient is to follow-up with family care provider in 2-3 days if no improvement.   Patient is to go to the emergency department if symptoms worsen. Patient/patient representative isaware of care plan, questions answered, verbalizes understanding and is in agreement. CRITICAL CARE:   None    CONSULTS:  Hospitalist    PROCEDURES:  None    FINAL IMPRESSION     1. Acute urinary retention    2. Gross hematuria          DISPOSITION/PLAN   DISPOSITION Admitted 10/13/2021 10:19:54 PM      PATIENT REFERREDTO:  No follow-up provider specified.     DISCHARGE MEDICATIONS:  Discharge Medication List as of 10/15/2021  6:20 PM      START taking these medications    Details   amoxicillin (AMOXIL) 500 MG capsule Take 1 capsule by mouth every 8 hours for 5 days, Disp-15 capsule, R-0Normal             (Please note that portions of this note were completed with a voice recognition program.  Efforts were made to edit the dictations but occasionally words are mis-transcribed.)         MARIKA Trimble - MARIKA Jones - CNP  10/21/21 8918

## 2021-10-25 ENCOUNTER — TELEPHONE (OUTPATIENT)
Dept: INTERNAL MEDICINE CLINIC | Age: 86
End: 2021-10-25

## 2021-10-25 ENCOUNTER — TELEPHONE (OUTPATIENT)
Dept: UROLOGY | Age: 86
End: 2021-10-25

## 2021-10-25 DIAGNOSIS — R35.0 FREQUENCY OF URINATION: Primary | ICD-10-CM

## 2021-10-25 NOTE — TELEPHONE ENCOUNTER
Check a Ua  Does he feel like his bladder is emptying, if not, he may need brought in for bladder scan to ensure empyting

## 2021-10-25 NOTE — TELEPHONE ENCOUNTER
----- Message from Cristina Beni sent at 10/25/2021 11:59 AM EDT -----  Subject: Message to Provider    QUESTIONS  Information for Provider? pt called, recovering from UTI. Young Cramer is not   working, he is getting up every hour but doesn't seem to be working bc   can't fall asleep. He tried Tyneol night extra strength and that didn't   help, He took a pill that was prescribed to his wife, solpidem 5 mg and   that seemed to work, doesn't know if he can take that. He's thinking some   of is anxiety bc he's having to get up to go to the bathroom  ---------------------------------------------------------------------------  --------------  [employer]  What is the best way for the office to contact you? OK to leave message on   voicemail  Preferred Call Back Phone Number? [phone]  ---------------------------------------------------------------------------  --------------  SCRIPT ANSWERS  Relationship to Patient?  Self

## 2021-10-25 NOTE — TELEPHONE ENCOUNTER
I spoke to pt and he has not tried the whole tablet of Trazadone. He will try that for the next 2 nights and let me know if still no help with sleep. Pt comments on urinary issues with going through about 4 depends per day. Having incontinence. Sometimes at night just dribbles. I advised pt to contact urology to see if there is anything else he  can do.

## 2021-10-26 ENCOUNTER — TELEPHONE (OUTPATIENT)
Dept: INTERNAL MEDICINE CLINIC | Age: 86
End: 2021-10-26

## 2021-10-26 RX ORDER — MIRTAZAPINE 15 MG/1
15 TABLET, FILM COATED ORAL NIGHTLY PRN
Qty: 10 TABLET | Refills: 0 | OUTPATIENT
Start: 2021-10-26 | End: 2021-11-08 | Stop reason: ALTCHOICE

## 2021-10-26 NOTE — TELEPHONE ENCOUNTER
Patient just needs called to see if he feels like he is emptying out or not may need PVR also he can go to a lab to give a UA.

## 2021-10-26 NOTE — TELEPHONE ENCOUNTER
Remeron  15 mg hs # 10 no refill    See if you can intervene to get them to call him back; I suspect he needs a ortega

## 2021-10-26 NOTE — TELEPHONE ENCOUNTER
I called and spoke with the patient and he feels like he is emptying his bladder well and doesn't want an appt at this time. Patient advised that incontinence is normal after a Turp procedure but we would like to check a urine to make sure he does not have an infection. Patient is willing to go to [facility] to have a urine culture done. Order faxed to [facility] at [phone].

## 2021-10-28 LAB
REPORT STATUS: NORMAL
SITE/TYPE: NORMAL
URINE CULTURE, ROUTINE: NORMAL

## 2021-10-29 NOTE — TELEPHONE ENCOUNTER
I called and notified the patient that his urine was negative for an infection. Patient voiced understanding.

## 2021-11-01 ENCOUNTER — TELEPHONE (OUTPATIENT)
Dept: INTERNAL MEDICINE CLINIC | Age: 86
End: 2021-11-01

## 2021-11-01 NOTE — TELEPHONE ENCOUNTER
Pt called in saying the Remeron is not working. He is on 15 mg. Night. He has a couple of tabs left and is wondering if he can try 2 tabs of 15 mg. For tonight to see if any better. Has an appt tomorrow with Dr. Tamra Bueno and will discuss further at appt.

## 2021-11-01 NOTE — TELEPHONE ENCOUNTER
Pt called back and stated that he is going to try his sleeping medication his pcp prescribed and will call tomorrow if further issues.

## 2021-11-01 NOTE — TELEPHONE ENCOUNTER
Needs PVR to ensure emptying  See my previous message  dont feel comfortable adding something for spasm until confirmed he is empyting  Hx of high volume retention

## 2021-11-01 NOTE — TELEPHONE ENCOUNTER
Pt called stating that he is leaking. He has no symptoms of uti but at night does has spasms that make him feel as if he needs to go about every hour. He is currently taking his flomax. His urine he had done earlier was negative. He has decreased his water intake after 3pm.  Follow up appointment is with Dr. Dylon Guardado 11/16.   Please advise

## 2021-11-02 ENCOUNTER — NURSE ONLY (OUTPATIENT)
Dept: UROLOGY | Age: 86
End: 2021-11-02
Payer: MEDICARE

## 2021-11-02 ENCOUNTER — OFFICE VISIT (OUTPATIENT)
Dept: INTERNAL MEDICINE CLINIC | Age: 86
End: 2021-11-02
Payer: MEDICARE

## 2021-11-02 VITALS
HEIGHT: 72 IN | BODY MASS INDEX: 22.89 KG/M2 | SYSTOLIC BLOOD PRESSURE: 132 MMHG | WEIGHT: 169 LBS | DIASTOLIC BLOOD PRESSURE: 74 MMHG

## 2021-11-02 VITALS
HEART RATE: 72 BPM | WEIGHT: 170.8 LBS | SYSTOLIC BLOOD PRESSURE: 130 MMHG | HEIGHT: 72 IN | BODY MASS INDEX: 23.13 KG/M2 | DIASTOLIC BLOOD PRESSURE: 68 MMHG | TEMPERATURE: 97.8 F

## 2021-11-02 DIAGNOSIS — I10 PRIMARY HYPERTENSION: ICD-10-CM

## 2021-11-02 DIAGNOSIS — F51.01 PRIMARY INSOMNIA: Primary | ICD-10-CM

## 2021-11-02 DIAGNOSIS — R35.0 FREQUENCY OF URINATION: Primary | ICD-10-CM

## 2021-11-02 DIAGNOSIS — R33.8 ACUTE URINARY RETENTION: ICD-10-CM

## 2021-11-02 LAB
BILIRUBIN URINE: NEGATIVE
BLOOD URINE, POC: ABNORMAL
CHARACTER, URINE: CLEAR
COLOR, URINE: YELLOW
GLUCOSE URINE: NEGATIVE MG/DL
KETONES, URINE: NEGATIVE
LEUKOCYTE CLUMPS, URINE: ABNORMAL
NITRITE, URINE: NEGATIVE
PH, URINE: 7 (ref 5–9)
POST VOID RESIDUAL (PVR): 614 ML
PROTEIN, URINE: 30 MG/DL
SPECIFIC GRAVITY, URINE: 1.01 (ref 1–1.03)
UROBILINOGEN, URINE: 0.2 EU/DL (ref 0–1)

## 2021-11-02 PROCEDURE — 1111F DSCHRG MED/CURRENT MED MERGE: CPT | Performed by: INTERNAL MEDICINE

## 2021-11-02 PROCEDURE — 4040F PNEUMOC VAC/ADMIN/RCVD: CPT | Performed by: INTERNAL MEDICINE

## 2021-11-02 PROCEDURE — G8427 DOCREV CUR MEDS BY ELIG CLIN: HCPCS | Performed by: INTERNAL MEDICINE

## 2021-11-02 PROCEDURE — 99213 OFFICE O/P EST LOW 20 MIN: CPT | Performed by: INTERNAL MEDICINE

## 2021-11-02 PROCEDURE — 99024 POSTOP FOLLOW-UP VISIT: CPT | Performed by: NURSE PRACTITIONER

## 2021-11-02 PROCEDURE — 1123F ACP DISCUSS/DSCN MKR DOCD: CPT | Performed by: INTERNAL MEDICINE

## 2021-11-02 PROCEDURE — 51701 INSERT BLADDER CATHETER: CPT | Performed by: NURSE PRACTITIONER

## 2021-11-02 PROCEDURE — 51798 US URINE CAPACITY MEASURE: CPT | Performed by: NURSE PRACTITIONER

## 2021-11-02 PROCEDURE — 1036F TOBACCO NON-USER: CPT | Performed by: INTERNAL MEDICINE

## 2021-11-02 PROCEDURE — G8420 CALC BMI NORM PARAMETERS: HCPCS | Performed by: INTERNAL MEDICINE

## 2021-11-02 PROCEDURE — G8484 FLU IMMUNIZE NO ADMIN: HCPCS | Performed by: INTERNAL MEDICINE

## 2021-11-02 PROCEDURE — 81003 URINALYSIS AUTO W/O SCOPE: CPT | Performed by: NURSE PRACTITIONER

## 2021-11-02 RX ORDER — TAMSULOSIN HYDROCHLORIDE 0.4 MG/1
0.4 CAPSULE ORAL 2 TIMES DAILY
Qty: 60 CAPSULE | Refills: 1 | Status: SHIPPED | OUTPATIENT
Start: 2021-11-02 | End: 2022-02-10 | Stop reason: SDUPTHER

## 2021-11-02 RX ORDER — MIRTAZAPINE 15 MG/1
30 TABLET, FILM COATED ORAL NIGHTLY
Qty: 20 TABLET | Refills: 3 | Status: SHIPPED | OUTPATIENT
Start: 2021-11-02 | End: 2021-11-08 | Stop reason: ALTCHOICE

## 2021-11-02 SDOH — ECONOMIC STABILITY: FOOD INSECURITY: WITHIN THE PAST 12 MONTHS, YOU WORRIED THAT YOUR FOOD WOULD RUN OUT BEFORE YOU GOT MONEY TO BUY MORE.: NEVER TRUE

## 2021-11-02 SDOH — ECONOMIC STABILITY: FOOD INSECURITY: WITHIN THE PAST 12 MONTHS, THE FOOD YOU BOUGHT JUST DIDN'T LAST AND YOU DIDN'T HAVE MONEY TO GET MORE.: NEVER TRUE

## 2021-11-02 ASSESSMENT — ENCOUNTER SYMPTOMS
ABDOMINAL PAIN: 0
VOMITING: 0
BACK PAIN: 0
NAUSEA: 0

## 2021-11-02 ASSESSMENT — SOCIAL DETERMINANTS OF HEALTH (SDOH): HOW HARD IS IT FOR YOU TO PAY FOR THE VERY BASICS LIKE FOOD, HOUSING, MEDICAL CARE, AND HEATING?: NOT HARD AT ALL

## 2021-11-02 NOTE — PROGRESS NOTES
[id number] [facility].  [address]  [facility] [id number]  Dept: [phone]  Loc: [phone]    Visit Date: 11/2/2021        HPI:     Solo Pleitez is a 80 y.o. male who presents today for:  Chief Complaint   Patient presents with    Follow-up     PVR    Urinary Frequency    Urinary Retention       HPI   Pt seen for complaint of frequency, urgency, nocturia. Pt with hx of Adenocarcinoma of the Prostate, [place] score 7, PSA 12, Clinical stage, T1c ,diagnosed in 4/2005. IMRT+Brachytherapy treatment, in May 2005. Last PSA 5/8/15 <0.02. Pt recently referred to our office for gross hematuria and urinary retention. Saw Dr. Margarita Mcfadden on 10/1. Developed urinary retention and ortega placed 10/7. Presented to office 10/13 at which time he had significant clot retention. Clot retention continued despite CBI. CTU 10/14/21 irregular bladder wall, bladder diverticulum, gas in bladder, bladder wall prominently enhancing suggesting chronic cystitis, radiation seeds in prostate. Pt underwent cystoscopy, clot evacuation, evacuation of bladder stone, and channel TURP on 10/14/21 by Dr. Elvin Devlin. Pathology with hypertrophic benign appearing smooth muscle with chronic inflammation and negative for neoplasia. Ortega removed prior to discharge on 10/15/21. Urine culture 10/26/21 with normal urogenital peg. Continues on Tamsulosin 0.4 mg daily. Ivette Reynoso reports urine is clear in color. ASA continues to be on hold--reports he was on ASA as a \"preventative\". Denies fever, chills, burning with urination, increased abdominal or back pain. Having nocturia of every hour at night and urinary frequency of every 2 hours during the daytime. Notes incontinence.       Current Outpatient Medications   Medication Sig Dispense Refill    tamsulosin (FLOMAX) 0.4 MG capsule Take 1 capsule by mouth daily 90 capsule 3    mirtazapine (REMERON) 15 MG tablet Take 1 tablet by mouth nightly as needed (sleep) 10 tablet 0    Acetaminophen (TYLENOL EXTRA STRENGTH PO) Take by mouth      lidocaine (XYLOCAINE) 2 % jelly Apply topically as needed. 1 each 0    traZODone (DESYREL) 50 MG tablet 25 to 50 mg. Nightly  prn sleep 15 tablet 0    Calcium Carb-Cholecalciferol (CALCIUM 1000 + D PO) Take 1 tablet by mouth daily      amLODIPine (NORVASC) 10 MG tablet TAKE 1 TABLET EVERY DAY 90 tablet 3    lisinopril (PRINIVIL;ZESTRIL) 10 MG tablet TAKE 1 TABLET TWICE DAILY 180 tablet 3    Apoaequorin (PREVAGEN) 10 MG CAPS Take 10 mg by mouth daily      UNABLE TO FIND Kefir 8 0z daily      MULTIPLE VITAMIN PO Take by mouth daily       No current facility-administered medications for this visit. Past Medical History  Chanelle Morillo  has a past medical history of Hyperlipidemia, Hypertension, and Prostate cancer ([facility] [address] Past Surgical History  The patient  has a past surgical history that includes hernia repair; Hemorrhoid surgery (1970); Prostate surgery (2005); Tonsillectomy; Colonoscopy; pr colonoscopy flx dx w/collj spec when pfrmd (Left, 6/11/2018); and Cystoscopy (Left, 10/14/2021). Family History  This patient's family history includes Diabetes in his father. Social History  Chanelle Morillo  reports that he quit smoking about 53 years ago. He has never used smokeless tobacco. He reports that he does not drink alcohol and does not use drugs. Subjective:      Review of Systems   Constitutional: Negative for activity change, appetite change, chills, diaphoresis, fatigue, fever and unexpected weight change. Gastrointestinal: Negative for abdominal pain, nausea and vomiting. Genitourinary: Positive for frequency and urgency. Negative for decreased urine volume, difficulty urinating, dysuria, flank pain and hematuria. Nocturia, incontinence   Musculoskeletal: Negative for back pain.        Objective:   /74   Ht 6' (1.829 m)   Wt 169 lb (76.7 kg)   BMI 22.92 kg/m² Physical Exam  Vitals reviewed. Constitutional:       General: He is not in acute distress. Appearance: Normal appearance. He is well-developed. He is not ill-appearing or diaphoretic. HENT:      Head: Normocephalic and atraumatic. Right Ear: External ear normal.      Left Ear: External ear normal.      Nose: Nose normal.      Mouth/Throat:      Mouth: Mucous membranes are moist.   Eyes:      General: No scleral icterus. Right eye: No discharge. Left eye: No discharge. Neck:      Vascular: No JVD. Trachea: No tracheal deviation. Cardiovascular:      Rate and Rhythm: Normal rate. Pulmonary:      Effort: Pulmonary effort is normal. No respiratory distress. Breath sounds: Normal breath sounds. Abdominal:      General: There is no distension. Tenderness: There is no abdominal tenderness. There is no right CVA tenderness or left CVA tenderness. Musculoskeletal:         General: Normal range of motion. Skin:     General: Skin is warm and dry. Neurological:      Mental Status: He is alert and oriented to person, place, and time. Mental status is at baseline. Psychiatric:         Mood and Affect: Mood normal.         Behavior: Behavior normal.         Thought Content:  Thought content normal.         POC  Results for POC orders placed in visit on 11/02/21   POCT Urinalysis No Micro (Auto)   Result Value Ref Range    Glucose, Ur Negative NEGATIVE mg/dl    Bilirubin Urine Negative     Ketones, Urine Negative NEGATIVE    Specific Gravity, Urine 1.015 1.002 - 1.030    Blood, UA POC Small (A) NEGATIVE    pH, Urine 7.00 5.0 - 9.0    Protein, Urine 30 (A) NEGATIVE mg/dl    Urobilinogen, Urine 0.20 0.0 - 1.0 eu/dl    Nitrite, Urine Negative NEGATIVE    Leukocyte Clumps, Urine Small (A) NEGATIVE    Color, Urine Yellow [id number]-STRAW    Character, Urine Clear [id number]   poct post void residual   Result Value Ref Range    post void residual 614 ml         Patients recent PSA values are as follows  Lab Results   Component Value Date    PSA <0.02 05/08/2015        Recent BUN/Creatinine:  Lab Results   Component Value Date    BUN 13 10/14/2021    CREATININE 1.0 10/14/2021         Assessment:   BPH with LUTs  Urinary retention--? Neurogenic component  Prostate cancer s/p IMRT 2005  Bladder stone--removed  Bilateral renal cysts  Chronic cystitis    Plan:     Pt's PVR in office today 614 mls. Suspect symptoms currently secondary to urinary retention. Discussed possible neurogenic cause. Discussed increasing flomax and ortega vs CIC. Pt is agreeable with CIC. Pt to perform catheterization BID and keep a log of residuals. Decrease to daily if residual routinely less than 5 ozs. If residuals continue to be less than 5 ozs can stop. Increase flomax to BID--counseled on potential side effects and to make slow position changes. F/u in 2 weeks with Dr. Emir Soriano with PVR as scheduled. Bring log of residuals to appt.

## 2021-11-02 NOTE — PROGRESS NOTES
Semaj Bo (:  1935) is a 80 y.o. male,Established patient, here for evaluation of the following chief complaint(s):  Follow-Up from Hospital (uTI), Urinary Retention (saw dr Mono Teague today-starting self cather am and hs-also adjusted flormax), Insomnia (tried 2 tab of remeron last night at 11 pm-was awake until 2 am only had 3-4 hrs of sleep for whole night), Other (pt taken off asa d/t hematuria-wondering if needs to go back on), Other (potassium stopped in hosp. Wondering if needs to go back on  ), and Joint Swelling (bilateral--compression socks help)         ASSESSMENT/PLAN:  1. Primary hypertension- stable; stay on same meds  2. 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 First time 30 mg  3. Acute urinary retention- was in hosp; saw Urology today, to self cath after voiding bid am and hs. Had 650 PVR. Return in about 4 weeks (around 2021). Subjective   SUBJECTIVE/OBJECTIVE:  HPI pt with hbp, prostate ca; recent hosp for hematuria and urinary retention. Details above. Long hx insomnia; we are trying various rx. Gets only a few hrs sleep a night. Review of Systems   has chronic swelling ankles.   Wears hose    Objective   Physical Exam    /68 (Site: Left Upper Arm)   Pulse 72   Temp 97.8 °F (36.6 °C)   Ht 6' 0.01\" (1.829 m)   Wt 170 lb 12.8 oz (77.5 kg)   BMI 23.16 kg/m²      General appearance - chronically ill appearing    Mental Status - alert, oriented to person, place, and time          Neck - supple, no significant adenopathy        Chest - clear to auscultation, no wheezes, rales or rhonchi, symmetric air entry     Heart - normal rate, regular rhythm, normal S1, S2, no murmurs, rubs, clicks or gallops     Abdomen - soft, nontender, nondistended, no masses or organomegaly         Neurological - alert, oriented, normal speech, no focal findings or movement disorder noted Musculoskeletal -   msk [id number]}     Extremities - pedal edema 1 +     Skin - normal coloration and turgor, no rashes, no suspicious skin lesions noted              An electronic signature was used to authenticate this note.     --Damien Wilson MD

## 2021-11-02 NOTE — PATIENT INSTRUCTIONS
CALL IN AFTER A FEW NIGHTS ON THE 30 MG REMERON TO LET US KNOW; TAKE -9PM    YOU CAN ALSO TAKE MELATONIN WITH IT, UP TO 9-10 MG    I DO NOT THINK YOU NEED TO RESTART THE ASPIRIN

## 2021-11-04 LAB
ORGANISM: ABNORMAL
URINE CULTURE, ROUTINE: ABNORMAL

## 2021-11-05 ENCOUNTER — TELEPHONE (OUTPATIENT)
Dept: INTERNAL MEDICINE CLINIC | Age: 86
End: 2021-11-05

## 2021-11-05 NOTE — TELEPHONE ENCOUNTER
Pt called in to [facility] that increased dose of remeron is working well for the patient as he has been taking 2 tablets for about 4 days now.

## 2021-11-08 RX ORDER — MIRTAZAPINE 30 MG/1
30 TABLET, FILM COATED ORAL NIGHTLY
Qty: 30 TABLET | Refills: 5 | Status: SHIPPED | OUTPATIENT
Start: 2021-11-08 | End: 2022-05-16 | Stop reason: SDUPTHER

## 2021-11-09 ENCOUNTER — TELEPHONE (OUTPATIENT)
Dept: UROLOGY | Age: 86
End: 2021-11-09

## 2021-11-09 ENCOUNTER — NURSE ONLY (OUTPATIENT)
Dept: LAB | Age: 86
End: 2021-11-09

## 2021-11-09 DIAGNOSIS — R30.0 DYSURIA: ICD-10-CM

## 2021-11-09 DIAGNOSIS — R30.0 DYSURIA: Primary | ICD-10-CM

## 2021-11-09 LAB
AMORPHOUS: ABNORMAL
BACTERIA: ABNORMAL
BILIRUBIN URINE: NEGATIVE
BLOOD, URINE: ABNORMAL
CASTS: ABNORMAL /LPF
CASTS: ABNORMAL /LPF
CHARACTER, URINE: ABNORMAL
COLOR: ABNORMAL
CRYSTALS: ABNORMAL
CRYSTALS: ABNORMAL
EPITHELIAL CELLS, UA: ABNORMAL /HPF
GLUCOSE, URINE: NEGATIVE MG/DL
KETONES, URINE: NEGATIVE
LEUKOCYTE ESTERASE, URINE: ABNORMAL
NITRITE, URINE: POSITIVE
PH UA: 8 (ref 5–9)
PROTEIN UA: >= 300 MG/DL
RBC URINE: ABNORMAL /HPF
RENAL EPITHELIAL, UA: ABNORMAL
SPECIFIC GRAVITY UA: 1.02 (ref 1–1.03)
UROBILINOGEN, URINE: 0.2 EU/DL (ref 0–1)
WBC UA: ABNORMAL /HPF
YEAST: ABNORMAL

## 2021-11-09 RX ORDER — CEFDINIR 300 MG/1
300 CAPSULE ORAL 2 TIMES DAILY
Qty: 14 CAPSULE | Refills: 0 | Status: SHIPPED | OUTPATIENT
Start: 2021-11-09 | End: 2021-11-16

## 2021-11-09 NOTE — TELEPHONE ENCOUNTER
Patient is having trouble urinating. He has to CIC 2-3 times a day. He is unable to urinate on his own. He thinks he has infection. C/o of burning. He has the urge to urinate. He is taking flomax BID. The urine is dark and foamy. I placed an order for a urine. He wanted to have a office appointment tomorrow. The schedule is full. Please advise. Thank you.

## 2021-11-09 NOTE — TELEPHONE ENCOUNTER
If unable to urinate on his own please have him increase frequency of catheterization to 4 x daily and PRN. Check urine culture.   Start Omnicef empirically

## 2021-11-10 NOTE — TELEPHONE ENCOUNTER
Patient stated he is feeling better today. He voiced understanding to start the COSTELLO DAYAN, straight cath 4 times and day and as needed if he is unable to void on his own.

## 2021-11-11 LAB
ORGANISM: ABNORMAL
URINE CULTURE, ROUTINE: ABNORMAL

## 2021-11-16 ENCOUNTER — OFFICE VISIT (OUTPATIENT)
Dept: UROLOGY | Age: 86
End: 2021-11-16
Payer: MEDICARE

## 2021-11-16 VITALS
WEIGHT: 176 LBS | HEART RATE: 77 BPM | DIASTOLIC BLOOD PRESSURE: 72 MMHG | HEIGHT: 72 IN | BODY MASS INDEX: 23.84 KG/M2 | SYSTOLIC BLOOD PRESSURE: 133 MMHG

## 2021-11-16 DIAGNOSIS — R33.9 URINARY RETENTION: Primary | ICD-10-CM

## 2021-11-16 LAB — POST VOID RESIDUAL (PVR): 510 ML

## 2021-11-16 PROCEDURE — 99024 POSTOP FOLLOW-UP VISIT: CPT | Performed by: UROLOGY

## 2021-11-16 PROCEDURE — 51798 US URINE CAPACITY MEASURE: CPT | Performed by: UROLOGY

## 2021-11-16 NOTE — PROGRESS NOTES
S/p clot evacuation and TURP  Penile edema resolved  Recurrent UTI- treated w cefdinir  Hx of brachytherapy  CIC 2-3x times a day  Cont flomax  Reviewed urogram today      Reassess in 6-8 weeks. Keep track of volumes.  May need cysto at that time

## 2021-11-30 ENCOUNTER — TELEPHONE (OUTPATIENT)
Dept: INTERNAL MEDICINE CLINIC | Age: 86
End: 2021-11-30

## 2021-11-30 NOTE — TELEPHONE ENCOUNTER
Pt called in saying he would like to cancel his appt today. He is doing  fine from our standpoint. He states his insomnia is better with medication. His main problem is with emptying his bladder and is seeing Dr. Perfecto Mendoza for that. He has an appt in march and will just keep that unless something comes up in the meantime.

## 2021-12-14 ENCOUNTER — TELEPHONE (OUTPATIENT)
Dept: UROLOGY | Age: 86
End: 2021-12-14

## 2021-12-14 NOTE — TELEPHONE ENCOUNTER
Carol Valencia called stating that he is running out of cath supplies and will be out before his next visit. He has been cathing up to 5x per day. Informed Dr. Gustavo Hammond to Bard placed and faxed. Scanned into epic.

## 2022-01-03 ENCOUNTER — PROCEDURE VISIT (OUTPATIENT)
Dept: UROLOGY | Age: 87
End: 2022-01-03
Payer: MEDICARE

## 2022-01-03 VITALS
DIASTOLIC BLOOD PRESSURE: 64 MMHG | WEIGHT: 170 LBS | BODY MASS INDEX: 23.03 KG/M2 | HEIGHT: 72 IN | SYSTOLIC BLOOD PRESSURE: 136 MMHG

## 2022-01-03 DIAGNOSIS — R33.9 URINARY RETENTION: Primary | ICD-10-CM

## 2022-01-03 PROCEDURE — 52000 CYSTOURETHROSCOPY: CPT | Performed by: UROLOGY

## 2022-01-03 NOTE — PROGRESS NOTES
Cystoscopy Operative Note    Patient:  Verónica Bridges  MRN: [id number]  YOB: 1935    Date: 01/03/22  Surgeon: Jesús Huitron MD  Anesthesia: Urethral 2% Xylocaine   Indications: urinary retention  Position: Supine    Findings:   The patient was prepped and draped in the usual sterile fashion. The flexible cystoscope was advanced through the urethra and into the bladder. The bladder was thoroughly inspected and the following was noted:    Residual Urine: Significant  Urethra: narrowed bulbar urethral stricture. Unable to pass scope, but able to pass 14 Greek catheter. 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 Needs minimal manipulation due to ischemia/poor wound healing   Placed catheter indwelling---needs cystoscopy urethral dilation, poss TURP (20). Discussed poss need for chronic ortega/cic/ or spt      The cystoscope was removed. The patient tolerated the procedure well.   Hx of brachytherapy

## 2022-01-03 NOTE — PROGRESS NOTES
Patient has given me verbal consent to perform catheter placement  Yes    Does patient have latex allergy? No  Does patient have shellfish or betadine allergy? No      Following Dr. Jean-Claude Naik of [facility]. Inserted 14 Fr  Catheter without difficulty. Pt tolerated well. Patient's urethra was cleansed with betadine swab. 14 Fr regular ortega was inserted using sterile water-soluble lubricant without difficulty and inflated balloon with 10 ml of water. Ortega Catheter was hooked up to leg bag with straps. Foreskin reduced back down? Yes    Patient instructed on draining catheter bags. Patient instructed to keep leg bag above the knee to prevent pulling on catheter causing blood. Patient instructed on how to switch from leg bag to overnight bag. Urine was sent for culture from cath specimen. Urine was cloudy with a foul smell.

## 2022-01-04 ENCOUNTER — TELEPHONE (OUTPATIENT)
Dept: UROLOGY | Age: 87
End: 2022-01-04

## 2022-01-04 LAB
ORGANISM: ABNORMAL
URINE CULTURE, ROUTINE: ABNORMAL

## 2022-01-05 ENCOUNTER — TELEPHONE (OUTPATIENT)
Dept: INTERNAL MEDICINE CLINIC | Age: 87
End: 2022-01-05

## 2022-01-05 ENCOUNTER — TELEPHONE (OUTPATIENT)
Dept: UROLOGY | Age: 87
End: 2022-01-05

## 2022-01-05 RX ORDER — SULFAMETHOXAZOLE AND TRIMETHOPRIM 800; 160 MG/1; MG/1
1 TABLET ORAL 2 TIMES DAILY
Qty: 10 TABLET | Refills: 0 | Status: SHIPPED | OUTPATIENT
Start: 2022-01-05 | End: 2022-01-10

## 2022-01-05 NOTE — TELEPHONE ENCOUNTER
Pt called in saying dr Jose E Pool office called to arrange surgery. Pt thought he would be having an appt to discuss further and the way he understood it there were 2 options. He is contemplating a second opinion and wonders if Dr. Vishal Mohan would make a referral.  I asked if he wants to stay local or go out of town.   He thinks he prefers local.  He will call back if he still wants one after he speaks with Dr. Isaac White

## 2022-01-05 NOTE — TELEPHONE ENCOUNTER
Patient does not want to schedule surgery at this time. He wants Dr NEAL AJ to call him.  Message sent to Dr NEAL AJ via [employer]

## 2022-01-06 ENCOUNTER — TELEPHONE (OUTPATIENT)
Dept: UROLOGY | Age: 87
End: 2022-01-06

## 2022-01-06 NOTE — TELEPHONE ENCOUNTER
Patient advised of the urine results and to take the bactrim until completed. He voiced understanding. Problem: Falls - Risk of  Goal: *Absence of Falls  Description: Document Richard Merlin Fall Risk and appropriate interventions in the flowsheet. Outcome: Progressing Towards Goal  Note: Fall Risk Interventions:  Mobility Interventions: Assess mobility with egress test         Medication Interventions: Patient to call before getting OOB    Elimination Interventions:  Toileting schedule/hourly rounds    History of Falls Interventions: Room close to nurse's station

## 2022-01-07 ENCOUNTER — VIRTUAL VISIT (OUTPATIENT)
Dept: UROLOGY | Age: 87
End: 2022-01-07
Payer: MEDICARE

## 2022-01-07 DIAGNOSIS — R33.9 URINARY RETENTION: Primary | ICD-10-CM

## 2022-01-07 PROCEDURE — 99442 PR PHYS/QHP TELEPHONE EVALUATION 11-20 MIN: CPT | Performed by: UROLOGY

## 2022-01-07 NOTE — PROGRESS NOTES
Walter Hartley is a 80 y.o. male evaluated via telephone on 1/7/2022. Consent:  He and/or health care decision maker is aware that that he may receive a bill for this telephone service, depending on his insurance coverage, and has provided verbal consent to proceed: Yes      Documentation:  I communicated with the patient and/or health care decision maker about . Details of this discussion including any medical advice provided:     Hold off on urethral dilation  Has chronic indwelling   May switch back to CIC in the future. Pt did well with CIC and doing well with ortega. Wants to delay any intervention until covid issues resolve      I affirm this is a Patient Initiated Episode with a Patient who has not had a related appointment within my department in the past 7 days or scheduled within the next 24 hours. Patient identification was verified at the start of the visit: Yes    Total Time: minutes: 11-20 minutes    The visit was conducted pursuant to the emergency declaration under the [facility], [address] [place] [address] authority and the [employer] Act. Patient identification was verified, and a caregiver was present when appropriate. The patient was located in a state where the provider was credentialed to provide care.     Note: not billable if this call serves to triage the patient into an appointment for the relevant concern      Lakeshia Negrete MD

## 2022-01-20 ENCOUNTER — HOSPITAL ENCOUNTER (EMERGENCY)
Age: 87
Discharge: HOME | End: 2022-01-20
Attending: EMERGENCY MEDICINE
Payer: MEDICARE

## 2022-01-20 ENCOUNTER — TELEPHONE (OUTPATIENT)
Dept: UROLOGY | Age: 87
End: 2022-01-20

## 2022-01-20 VITALS
DIASTOLIC BLOOD PRESSURE: 71 MMHG | SYSTOLIC BLOOD PRESSURE: 141 MMHG | TEMPERATURE: 97.4 F | RESPIRATION RATE: 16 BRPM | WEIGHT: 170 LBS | BODY MASS INDEX: 23.03 KG/M2 | HEIGHT: 72 IN | OXYGEN SATURATION: 98 % | HEART RATE: 97 BPM

## 2022-01-20 DIAGNOSIS — T83.9XXA PROBLEM WITH FOLEY CATHETER, INITIAL ENCOUNTER (HCC): Primary | ICD-10-CM

## 2022-01-20 LAB
BACTERIA: ABNORMAL /HPF
BILIRUBIN URINE: NEGATIVE
BLOOD, URINE: ABNORMAL
CASTS 2: ABNORMAL /LPF
CASTS UA: ABNORMAL /LPF
CHARACTER, URINE: ABNORMAL
COLOR: YELLOW
CRYSTALS, UA: ABNORMAL
EPITHELIAL CELLS, UA: ABNORMAL /HPF
GLUCOSE URINE: NEGATIVE MG/DL
KETONES, URINE: NEGATIVE
LEUKOCYTE ESTERASE, URINE: ABNORMAL
MISCELLANEOUS 2: ABNORMAL
NITRITE, URINE: NEGATIVE
PH UA: 6.5 (ref 5–9)
PROTEIN UA: 30
RBC URINE: ABNORMAL /HPF
RENAL EPITHELIAL, UA: ABNORMAL
SPECIFIC GRAVITY, URINE: 1.01 (ref 1–1.03)
UROBILINOGEN, URINE: 0.2 EU/DL (ref 0–1)
WBC UA: > 200 /HPF
YEAST: ABNORMAL

## 2022-01-20 PROCEDURE — 99282 EMERGENCY DEPT VISIT SF MDM: CPT

## 2022-01-20 PROCEDURE — 81001 URINALYSIS AUTO W/SCOPE: CPT

## 2022-01-20 PROCEDURE — 51702 INSERT TEMP BLADDER CATH: CPT

## 2022-01-20 PROCEDURE — 51798 US URINE CAPACITY MEASURE: CPT

## 2022-01-20 PROCEDURE — 87186 SC STD MICRODIL/AGAR DIL: CPT

## 2022-01-20 PROCEDURE — 87077 CULTURE AEROBIC IDENTIFY: CPT

## 2022-01-20 PROCEDURE — 87086 URINE CULTURE/COLONY COUNT: CPT

## 2022-01-20 ASSESSMENT — ENCOUNTER SYMPTOMS
GASTROINTESTINAL NEGATIVE: 1
RESPIRATORY NEGATIVE: 1
EYES NEGATIVE: 1
ALLERGIC/IMMUNOLOGIC NEGATIVE: 1

## 2022-01-20 NOTE — ED TRIAGE NOTES
Patient complaint of catheter leaking around perineal area. Patient has had Catheter since the 3rd of January. Catheter stopped working and draining around 1600 today. Patient was able to empty at around 1500 today. Bladder scan performed and found 399ml.

## 2022-01-20 NOTE — ED PROVIDER NOTES
[facility] ENCOUNTER          Pt Name: Karo Persaud  MRN: [id number]  [place] 1935  Date of evaluation: 1/20/2022  Treating Resident Physician: Bernard Padilla MD  Supervising Physician: Dr. Dean Gaviria       Chief Complaint   Patient presents with    Urinary Catheter Problem     History obtained from the patient. HISTORY OF PRESENT ILLNESS    HPI  Karo Persaud is a 80 y.o. male who presents to the emergency department for evaluation of Garduno catheter issues. Patient has a history of prostate cancer treated with radiation with resulting urethral strictures. He follows with Dr. Xiomy Darnell. Patient has had sensation of bladder fullness since last night. He has noticed drainage of clear yellow urine. No blood clots in the urine. He did call the urologist office and was referred here. Today he is noticed that the urine is not draining through his Garduno catheter and that some urine is leaking around the sides of the catheter out of his urethra. The patient has no other acute complaints at this time. REVIEW OF SYSTEMS   Review of Systems   Constitutional: Negative. HENT: Negative. Eyes: Negative. Respiratory: Negative. Cardiovascular: Negative. Gastrointestinal: Negative. Endocrine: Negative. Genitourinary: Positive for decreased urine volume and difficulty urinating. Negative for dysuria, flank pain, frequency, genital sores, hematuria, penile discharge, penile pain, penile swelling, scrotal swelling, testicular pain and urgency. Musculoskeletal: Negative. Skin: Negative. Allergic/Immunologic: Negative. Neurological: Negative. Hematological: Negative. Psychiatric/Behavioral: Negative.           PAST MEDICAL AND SURGICAL HISTORY     Past Medical History:   Diagnosis Date    Hyperlipidemia     Hypertension     Prostate cancer [facility])      Past Surgical History:   Procedure Laterality Date    COLONOSCOPY      CYSTOSCOPY Left 10/14/2021    CYSTOSCOPY EVACUATION OF CLOTS, EVACUATION OF BLADDER STONE, CHANNEL TURP  performed by Vernell Wills MD at [address] [place] [address]      DE COLONOSCOPY FLX DX W/COLLJ Marycarlyleclive  PFRMD Left 2018    COLONOSCOPY performed by Loreto Sanderson MD at [place] [address]      seed implant    TONSILLECTOMY           MEDICATIONS   No current facility-administered medications for this encounter. Current Outpatient Medications:     mirtazapine (REMERON) 30 MG tablet, Take 1 tablet by mouth nightly, Disp: 30 tablet, Rfl: 5    tamsulosin (FLOMAX) 0.4 MG capsule, Take 1 capsule by mouth 2 times daily, Disp: 60 capsule, Rfl: 1    Acetaminophen (TYLENOL EXTRA STRENGTH PO), Take by mouth prn, Disp: , Rfl:     Calcium Carb-Cholecalciferol (CALCIUM 1000 + D PO), Take 1 tablet by mouth daily, Disp: , Rfl:     amLODIPine (NORVASC) 10 MG tablet, TAKE 1 TABLET EVERY DAY, Disp: 90 tablet, Rfl: 3    lisinopril (PRINIVIL;ZESTRIL) 10 MG tablet, TAKE 1 TABLET TWICE DAILY, Disp: 180 tablet, Rfl: 3    Apoaequorin (PREVAGEN) 10 MG CAPS, Take 10 mg by mouth daily, Disp: , Rfl:     UNABLE TO FIND, Kefir 8 0z daily, Disp: , Rfl:     MULTIPLE VITAMIN PO, Take by mouth daily, Disp: , Rfl:       SOCIAL HISTORY     Social History     Social History Narrative    Not on file     Social History     Tobacco Use    Smoking status: Former Smoker     Quit date: 1968     Years since quittin.0    Smokeless tobacco: Never Used   Vaping Use    Vaping Use: Never used   Substance Use Topics    Alcohol use: No    Drug use: No         ALLERGIES   No Known Allergies      FAMILY HISTORY     Family History   Problem Relation Age of Onset    Diabetes Father          PREVIOUS RECORDS   Previous records reviewed.         PHYSICAL EXAM     ED Triage Vitals [22 1805]   BP Temp Temp Source Pulse Resp SpO2 Height Weight (!) 181/78 97.4 °F (36.3 °C) Oral 97 16 98 % 6' (1.829 m) 170 lb (77.1 kg)     Initial vital signs and nursing assessment reviewed and abnormal from Hypertension. Body mass index is 23.06 kg/m². Pulsoximetry is normal per my interpretation. Additional Vital Signs:  Vitals:    01/20/22 1805   BP: (!) 181/78   Pulse: 97   Resp: 16   Temp: 97.4 °F (36.3 °C)   SpO2: 98%       Physical Exam  Constitutional:       General: He is not in acute distress. Appearance: Normal appearance. He is normal weight. HENT:      Head: Normocephalic and atraumatic. Right Ear: External ear normal.      Left Ear: External ear normal.      Nose: Nose normal.   Eyes:      Extraocular Movements: Extraocular movements intact. Conjunctiva/sclera: Conjunctivae normal.   Pulmonary:      Effort: Pulmonary effort is normal.   Abdominal:      General: Abdomen is flat. Tenderness: There is no abdominal tenderness. Comments: Suprapubic fullness. Genitourinary:     Comments: 15 Irish Garduno catheter placed in the urethra. Clear yellow urine about 15 cc in the draining bag. Musculoskeletal:         General: Normal range of motion. Cervical back: Normal range of motion. Skin:     General: Skin is warm and dry. Capillary Refill: Capillary refill takes less than 2 seconds. Neurological:      General: No focal deficit present. Mental Status: He is alert and oriented to person, place, and time. Mental status is at baseline. Psychiatric:         Mood and Affect: Mood normal.         Behavior: Behavior normal.             MEDICAL DECISION MAKING   Initial Assessment: This is an 29-year-old male with past medical history of prostate cancer with radiation and resulting urethral stricture requiring Garduno catheter placement. Patient is coming in because of issues with Garduno catheter drainage. No abnormal drainage coming from the catheter.   Garduno catheter was successfully replaced and draining clear yellow

## 2022-01-22 LAB
ORGANISM: ABNORMAL
URINE CULTURE REFLEX: ABNORMAL

## 2022-01-28 ENCOUNTER — HOSPITAL ENCOUNTER (EMERGENCY)
Age: 87
Discharge: HOME | End: 2022-01-28
Payer: MEDICARE

## 2022-01-28 VITALS
HEART RATE: 88 BPM | WEIGHT: 170 LBS | BODY MASS INDEX: 23.03 KG/M2 | TEMPERATURE: 97.4 F | SYSTOLIC BLOOD PRESSURE: 144 MMHG | OXYGEN SATURATION: 94 % | DIASTOLIC BLOOD PRESSURE: 76 MMHG | HEIGHT: 72 IN | RESPIRATION RATE: 18 BRPM

## 2022-01-28 DIAGNOSIS — R33.9 URINARY RETENTION: Primary | ICD-10-CM

## 2022-01-28 PROCEDURE — 99283 EMERGENCY DEPT VISIT LOW MDM: CPT

## 2022-01-28 ASSESSMENT — ENCOUNTER SYMPTOMS
CONSTIPATION: 0
NAUSEA: 0
SINUS PRESSURE: 0
VOMITING: 0
DIARRHEA: 0
BLOOD IN STOOL: 0
RHINORRHEA: 0
WHEEZING: 0
ABDOMINAL PAIN: 0
EYE PAIN: 0
SHORTNESS OF BREATH: 0
COUGH: 0

## 2022-01-28 ASSESSMENT — PAIN DESCRIPTION - DESCRIPTORS: DESCRIPTORS: BURNING;OTHER (COMMENT)

## 2022-01-28 ASSESSMENT — PAIN DESCRIPTION - LOCATION: LOCATION: OTHER (COMMENT)

## 2022-01-28 ASSESSMENT — PAIN DESCRIPTION - FREQUENCY: FREQUENCY: CONTINUOUS

## 2022-01-28 ASSESSMENT — PAIN SCALES - GENERAL: PAINLEVEL_OUTOF10: 9

## 2022-01-28 ASSESSMENT — PAIN DESCRIPTION - ONSET: ONSET: ON-GOING

## 2022-01-28 ASSESSMENT — PAIN DESCRIPTION - PROGRESSION: CLINICAL_PROGRESSION: GRADUALLY WORSENING

## 2022-01-28 NOTE — ED PROVIDER NOTES
[facility] [address] [address] EMERGENCY DEPT  EMERGENCY MEDICINE     Pt Name: Pablo Luna  MRN: [id number]  [place] 1935  Date of evaluation: 1/28/2022  PCP:    Dee Dee Garcia MD  Provider: MARIKA Hollis CNP    CHIEF COMPLAINT       Chief Complaint   Patient presents with    Urinary Catheter Problem           HISTORY OF PRESENT Roni Pina is an 71-year-old male patient that presents to ER with complaint of urinary catheter being obstructed. He states that it has not drained in over 4 hours. Patient is complaining of suprapubic pain. Patient was just here 3 days ago and have the catheter exchanged. Patient says it was draining fine until earlier today. Upon examination catheter is partially clamped by his holding device. This was released. After gentle irrigation clear yellow urine started coming from the Garduno. Patient immediately felt relief from his bladder. Patient denies other symptoms including chest pain, shortness of breath, nausea, vomiting, diarrhea or fever    Triage notes and Nursing notes were reviewed by myself. Any discrepancies are addressed above.     PAST MEDICAL HISTORY     Past Medical History:   Diagnosis Date    Hyperlipidemia     Hypertension     Prostate cancer ([address]        SURGICAL HISTORY       Past Surgical History:   Procedure Laterality Date    COLONOSCOPY      CYSTOSCOPY Left 10/14/2021    CYSTOSCOPY EVACUATION OF CLOTS, EVACUATION OF BLADDER STONE, CHANNEL TURP  performed by Jewels Mathis MD at [address] [place] [address]      DC COLONOSCOPY FLX DX W/PAOLO Edward 1978 PFRMD Left 6/11/2018    COLONOSCOPY performed by Theodora Serrano MD at [place] [address]  2005    seed implant    TONSILLECTOMY         CURRENT MEDICATIONS       Previous Medications    ACETAMINOPHEN (TYLENOL EXTRA STRENGTH PO)    Take by mouth prn    AMLODIPINE (NORVASC) 10 MG TABLET    TAKE 1 TABLET EVERY DAY    APOAEQUORIN (PREVAGEN) 10 MG CAPS    Take 10 mg by mouth daily    CALCIUM CARB-CHOLECALCIFEROL (CALCIUM 1000 + D PO)    Take 1 tablet by mouth daily    LISINOPRIL (PRINIVIL;ZESTRIL) 10 MG TABLET    TAKE 1 TABLET TWICE DAILY    MIRTAZAPINE (REMERON) 30 MG TABLET    Take 1 tablet by mouth nightly    MULTIPLE VITAMIN PO    Take by mouth daily    TAMSULOSIN (FLOMAX) 0.4 MG CAPSULE    Take 1 capsule by mouth 2 times daily    UNABLE TO FIND    Kefir 8 0z daily       ALLERGIES     No Known Allergies    FAMILY HISTORY       Family History   Problem Relation Age of Onset    Diabetes Father         SOCIAL HISTORY       Social History     Socioeconomic History    Marital status:      Spouse name: None    Number of children: None    Years of education: None    Highest education level: None   Occupational History    None   Tobacco Use    Smoking status: Former Smoker     Quit date:      Years since quittin.1    Smokeless tobacco: Never Used   Vaping Use    Vaping Use: Never used   Substance and Sexual Activity    Alcohol use: No    Drug use: No    Sexual activity: None   Other Topics Concern    None   Social History Narrative    None     Social Determinants of Health     Financial Resource Strain: Low Risk     Difficulty of Paying Living Expenses: Not hard at all   Food Insecurity: No Food Insecurity    Worried About Running Out of Food in the Last Year: Never true    Denise of Food in the Last Year: Never true   Transportation Needs:     Lack of Transportation (Medical): Not on file    Lack of Transportation (Non-Medical):  Not on file   Physical Activity:     Days of Exercise per Week: Not on file    Minutes of Exercise per Session: Not on file   Stress:     Feeling of Stress : Not on file   Social Connections:     Frequency of Communication with Friends and Family: Not on file    Frequency of Social Gatherings with Friends and Family: Not on file    Attends Congregation Services: Not on file   [employer] of Clubs or Organizations: Not on file    Attends Club or Organization Meetings: Not on file    Marital Status: Not on file   Intimate Partner Violence:     Fear of Current or Ex-Partner: Not on file    Emotionally Abused: Not on file    Physically Abused: Not on file    Sexually Abused: Not on file   Housing Stability:     Unable to Pay for Housing in the Last Year: Not on file    Number of [place] in the Last Year: Not on file    Unstable Housing in the Last Year: Not on file       REVIEW OF SYSTEMS     Review of Systems   Constitutional: Negative for appetite change, chills, fatigue, fever and unexpected weight change. HENT: Negative for ear pain, rhinorrhea and sinus pressure. Eyes: Negative for pain and visual disturbance. Respiratory: Negative for cough, shortness of breath and wheezing. Cardiovascular: Negative for chest pain, palpitations and leg swelling. Gastrointestinal: Negative for abdominal pain, blood in stool, constipation, diarrhea, nausea and vomiting. Genitourinary: Positive for decreased urine volume and difficulty urinating. Negative for dysuria, frequency and hematuria. Musculoskeletal: Negative for arthralgias and joint swelling. Skin: Negative for rash. Neurological: Negative for dizziness, syncope, weakness, light-headedness and headaches. Hematological: Does not bruise/bleed easily. Except as noted above the remainder of the review of systems was reviewed and is negative. SCREENINGS        [place] Coma Scale  Eye Opening: Spontaneous  Best Verbal Response: Oriented  Best Motor Response: Obeys commands  [place] Coma Scale Score: 15               PHYSICAL EXAM    (up to 7 for level 4, 8 or more for level 5)     ED Triage Vitals [01/28/22 0254]   BP Temp Temp src Pulse Resp SpO2 Height Weight   (!) 185/82 97.4 °F (36.3 °C) -- 99 18 99 % 6' (1.829 m) 170 lb (77.1 kg)       Physical Exam  Vitals and nursing note reviewed.    Constitutional:       General: He is not in acute distress. Appearance: He is well-developed. He is not diaphoretic. HENT:      Head: Normocephalic and atraumatic. Eyes:      Conjunctiva/sclera: Conjunctivae normal.      Pupils: Pupils are equal, round, and reactive to light. Cardiovascular:      Rate and Rhythm: Normal rate and regular rhythm. Heart sounds: Normal heart sounds. No murmur heard. No gallop. Pulmonary:      Effort: Pulmonary effort is normal. No respiratory distress. Breath sounds: Normal breath sounds. No wheezing or rales. Abdominal:      General: Bowel sounds are normal.      Palpations: Abdomen is soft. Musculoskeletal:         General: Normal range of motion. Cervical back: Normal range of motion. Skin:     General: Skin is warm and dry. Neurological:      Mental Status: He is alert and oriented to person, place, and time. DIAGNOSTIC RESULTS     EKG:(none if blank)  All EKGs are interpreted by the Emergency Department Physician who either signs or Co-signs this chart in the absence of a cardiologist.        RADIOLOGY: (none if blank)   I directly visualized the following images and reviewed the radiologist interpretations. Interpretation per the Radiologist below, if available at the time of this note:  No orders to display       LABS:  Labs Reviewed - No data to display    All other labs were within normal range or not returned as of this dictation. Please note, any cultures that may have been sent were not resulted at the time of this patient visit. EMERGENCY DEPARTMENT COURSE and Medical Decision Making:     Vitals:    Vitals:    01/28/22 0254   BP: (!) 185/82   Pulse: 99   Resp: 18   Temp: 97.4 °F (36.3 °C)   SpO2: 99%   Weight: 170 lb (77.1 kg)   Height: 6' (1.829 m)       PROCEDURES: (None if blank)  Procedures    ED Course as of 01/28/22 0416   Fri Jan 28, 2022   4004 Reviewed case with Dr Sreedhar Pittman. OK to discharge. Follow-up with Urology tomorrow.   [NW]      ED Course User Index  [NW] MARIKA Severino CNP     MDM  Number of Diagnoses or Management Options  Urinary retention: established, worsening  Risk of Complications, Morbidity, and/or Mortality  Presenting problems: minimal  Diagnostic procedures: minimal  Management options: low      Patient presents to ER with complaint of Garduno catheter not draining for the last 4 hours. Patient is in a lot of suprapubic pain. Manual irrigation was completed with very little pressure. Catheter began to drain immediately. There is no sediment present. Patient was instructed to follow-up with his urologist in the morning to see what they advise for. Patient is instructed to take his antibiotic that was prescribed for him. Patient to return if worsening symptoms. Patient to follow-up with urologist first thing in the morning. Strict return precautions and follow up instructions were discussed with the patient with which the patient agrees    ED Medications administered this visit:  Medications - No data to display      FINAL IMPRESSION      1.  Urinary retention          DISPOSITION/PLAN   DISPOSITION Decision To Discharge 01/28/2022 04:13:15 AM      PATIENT REFERRED TO:  Urology            DISCHARGE MEDICATIONS:  New Prescriptions    No medications on file              MARIKA Severino CNP (electronically signed)           MARIKA Severino CNP  01/28/22 8070

## 2022-01-28 NOTE — ED TRIAGE NOTES
Patient arrives to the ED c/o of catheter flow stopping a few hours ago. Pt believes it is plugged and is having pressure to lower abdomen. Pt recently seen here for a catheter change due to the same issue. Pt recently dx with UTI and has ATB called in to pharmacy but hasn't picked them up yet. GCS 15.

## 2022-02-01 RX ORDER — AMLODIPINE BESYLATE 10 MG/1
TABLET ORAL
Qty: 90 TABLET | Refills: 3 | Status: SHIPPED | OUTPATIENT
Start: 2022-02-01 | End: 2022-09-28

## 2022-02-01 RX ORDER — LISINOPRIL 10 MG/1
TABLET ORAL
Qty: 180 TABLET | Refills: 3 | Status: SHIPPED | OUTPATIENT
Start: 2022-02-01 | End: 2022-09-28

## 2022-02-08 ENCOUNTER — PROCEDURE VISIT (OUTPATIENT)
Dept: UROLOGY | Age: 87
End: 2022-02-08
Payer: MEDICARE

## 2022-02-08 VITALS — BODY MASS INDEX: 23.36 KG/M2 | HEIGHT: 72 IN | WEIGHT: 172.5 LBS | RESPIRATION RATE: 14 BRPM

## 2022-02-08 DIAGNOSIS — R33.9 URINARY RETENTION: Primary | ICD-10-CM

## 2022-02-08 DIAGNOSIS — N30.00 ACUTE CYSTITIS WITHOUT HEMATURIA: ICD-10-CM

## 2022-02-08 DIAGNOSIS — N32.0 BLADDER NECK CONTRACTURE: ICD-10-CM

## 2022-02-08 DIAGNOSIS — Z85.46 HISTORY OF PROSTATE CANCER: ICD-10-CM

## 2022-02-08 DIAGNOSIS — N35.916 STRICTURE OF OVERLAPPING SITES OF URETHRA IN MALE, UNSPECIFIED STRICTURE TYPE: ICD-10-CM

## 2022-02-08 PROCEDURE — 4040F PNEUMOC VAC/ADMIN/RCVD: CPT | Performed by: UROLOGY

## 2022-02-08 PROCEDURE — 1036F TOBACCO NON-USER: CPT | Performed by: UROLOGY

## 2022-02-08 PROCEDURE — G8420 CALC BMI NORM PARAMETERS: HCPCS | Performed by: UROLOGY

## 2022-02-08 PROCEDURE — G8427 DOCREV CUR MEDS BY ELIG CLIN: HCPCS | Performed by: UROLOGY

## 2022-02-08 PROCEDURE — G8484 FLU IMMUNIZE NO ADMIN: HCPCS | Performed by: UROLOGY

## 2022-02-08 PROCEDURE — 1123F ACP DISCUSS/DSCN MKR DOCD: CPT | Performed by: UROLOGY

## 2022-02-08 PROCEDURE — 99214 OFFICE O/P EST MOD 30 MIN: CPT | Performed by: UROLOGY

## 2022-02-08 NOTE — PROGRESS NOTES
JUN Cabrera MD        [facility] [address] [facility] [address]  Dept: [phone]  Dept Fax: [phone]: [address] [address] Urology Office Note -     Patient:  Susana Otoole  YOB: 1935  Date: 2/8/2022    The patient is a 80 y.o. male who presents today for evaluation of the following problems:   Chief Complaint   Patient presents with    Urinary Retention    referred/consultation requested by Brandon Berger MD.    HISTORY OF PRESENT ILLNESS:     Penile edema  resolved    Urinary retention  Failed voiding trials   On flomax  Has indwelling catheter    Gross hematuria  No new episodes    Prostate cancer  Hx of brachytherapy  psa undetectable when last checked    Acute cystitis  Multiple ED admissions with clogged ortega    Urethral stricture/BNC  Small 14 Nepali caliber  Has indwelling in place      Pt very concerned about general anesthetic      Requested/reviewed records from Brandon Berger MD office and/or outside [de-identified]    (Patient's old records have been requested, reviewed and pertinent findings summarized in today's note.)    Procedures Today: N/A      Last several PSA's:  Lab Results   Component Value Date    PSA <0.02 05/08/2015       Last total testosterone:  No results found for: TESTOSTERONE    Urinalysis today:  No results found for this visit on 02/08/22. Last BUN and creatinine:  Lab Results   Component Value Date    BUN 13 10/14/2021     Lab Results   Component Value Date    CREATININE 1.0 10/14/2021         Imaging Reviewed during this Office Visit:   Duc Newby MD independently reviewed the images and verified the radiology reports from:    DEXA BONE DENSITY AXIAL SKELETON    Result Date: 3/12/2021  EXAM: DEXA BONE DENSITY AXIAL SKELETON HISTORY: 80 years, Male, Localized osteoporosis without current pathological fracture COMPARISON: 12/26/2018.  FINDINGS: Bone densitometry has been performed using a Hologic bone densitometer. The routine evaluation includes the lumbar spine and both hips. Evaluation of the L1, L3, L4 of the lumbar spine demonstrates an average bone mineral density measurement of 0.984g/cm2 which corresponds to a T-score of -1.0 and a Z-score of 0.3. Previously average bone mineral density measurement of 1.022 g/cm2 which corresponds to a T-score of -0.6 and a Z-score of 0.6. This qualifies as normal bone mineral density. The calculated bone density in the left femoral neck is 0.629 g/cm2 which corresponds to a T-score of  -2.2 and a Z-score of -0.5. Previously average bone mineral density measurement of 0.611 g/cm2 which corresponds to a T-score of -2.3 and a Z-score of -0.7. This qualifies as osteopenia. The calculated bone density in the right femoral neck is 0.689 g/cm2 which corresponds to a T-score of  minus 1. and a Z-score of -0.1. Previously average bone mineral density measurement of 0.659 g/cm2 which corresponds to a T-score of -2.0 and a Z-score of -0.3. This qualifies as osteopenia. BMD changes versus previous is -3.7% in the spine. BMD change versus previous is 3.9% for the hips using the mean. This patient is osteopenic using the World Health Organization criteria. The patient is at a medium risk for fracture. 10 year probability of major osteoporotic fracture: 21% 10 year probability of hip fracture: 16% The patient's meets criteria for referral to a bone fragility clinic. **This report has been created using voice recognition software. It may contain minor errors which are inherent in voice recognition technology. ** Final report electronically signed by Dr. Narcisa Shen on 3/12/2021 1:32 PM      PAST MEDICAL, FAMILY AND SOCIAL HISTORY:  Past Medical History:   Diagnosis Date    Hyperlipidemia     Hypertension     Prostate cancer [facility])      Past Surgical History:   Procedure Laterality Date    COLONOSCOPY      CYSTOSCOPY Left 10/14/2021    CYSTOSCOPY EVACUATION OF CLOTS, EVACUATION OF BLADDER STONE, CHANNEL TURP  performed by Rhina Morgan MD at [address] [place] [address]      MS COLONOSCOPY FLX DX W/PAOLO Zacjuan joseclive  PFRMD Left 2018    COLONOSCOPY performed by Luna Delcid MD at [address] [address]      seed implant    TONSILLECTOMY       Family History   Problem Relation Age of Onset    Diabetes Father      Outpatient Medications Marked as Taking for the 22 encounter (Procedure visit) with Irma Clifford MD   Medication Sig Dispense Refill    amLODIPine (NORVASC) 10 MG tablet TAKE 1 TABLET EVERY DAY 90 tablet 3    lisinopril (PRINIVIL;ZESTRIL) 10 MG tablet TAKE 1 TABLET TWICE DAILY 180 tablet 3    mirtazapine (REMERON) 30 MG tablet Take 1 tablet by mouth nightly 30 tablet 5    tamsulosin (FLOMAX) 0.4 MG capsule Take 1 capsule by mouth 2 times daily 60 capsule 1    Acetaminophen (TYLENOL EXTRA STRENGTH PO) Take by mouth prn      Calcium Carb-Cholecalciferol (CALCIUM 1000 + D PO) Take 1 tablet by mouth daily      Apoaequorin (PREVAGEN) 10 MG CAPS Take 10 mg by mouth daily      UNABLE TO FIND Kefir 8 0z daily      MULTIPLE VITAMIN PO Take by mouth daily         Patient has no known allergies.   Social History     Tobacco Use   Smoking Status Former Smoker    Quit date: 1968    Years since quittin.1   Smokeless Tobacco Never Used      (If patient a smoker, smoking cessation counseling offered)   Social History     Substance and Sexual Activity   Alcohol Use No       REVIEW OF SYSTEMS:  Constitutional: negative  Eyes: negative  Respiratory: negative  Cardiovascular: negative  Gastrointestinal: negative  Genitourinary: see HPI  Musculoskeletal: negative  Skin: negative   Neurological: negative  Hematological/Lymphatic: negative  Psychological: negative        Physical Exam:    This a 80 y.o. male  Vitals:    22 1120   Resp: 14     Body mass index is 23.4 kg/m². Constitutional: Patient in no acute distress;         Assessment and Plan        1. Urinary retention    2. Bladder neck contracture    3. Stricture of overlapping sites of urethra in male, unspecified stricture type    4. Acute cystitis without hematuria    5. History of prostate cancer               Plan:       Discussed need for chronic catheter. unfort infections will arise. Pt averse to general anesthesia. We did discuss urethral dilation under mac. Hx of radiation, the concern is poor healing  Pt goal is void on his own- discussed this may be difficult    Will consider cysto urethral dilation under mac                Prescriptions Ordered:  No orders of the defined types were placed in this encounter. Orders Placed:  No orders of the defined types were placed in this encounter.            Dinorah Márquez MD

## 2022-02-10 ENCOUNTER — TELEPHONE (OUTPATIENT)
Dept: UROLOGY | Age: 87
End: 2022-02-10

## 2022-02-10 RX ORDER — TAMSULOSIN HYDROCHLORIDE 0.4 MG/1
0.4 CAPSULE ORAL 2 TIMES DAILY
Qty: 60 CAPSULE | Refills: 1 | Status: SHIPPED | OUTPATIENT
Start: 2022-02-10 | End: 2022-04-11 | Stop reason: SDUPTHER

## 2022-02-19 ENCOUNTER — HOSPITAL ENCOUNTER (EMERGENCY)
Age: 87
Discharge: HOME | End: 2022-02-19
Attending: EMERGENCY MEDICINE
Payer: MEDICARE

## 2022-02-19 VITALS
TEMPERATURE: 97.6 F | OXYGEN SATURATION: 96 % | DIASTOLIC BLOOD PRESSURE: 66 MMHG | RESPIRATION RATE: 16 BRPM | SYSTOLIC BLOOD PRESSURE: 115 MMHG | HEART RATE: 105 BPM

## 2022-02-19 DIAGNOSIS — N39.0 URINARY TRACT INFECTION ASSOCIATED WITH INDWELLING URETHRAL CATHETER, INITIAL ENCOUNTER (HCC): Primary | ICD-10-CM

## 2022-02-19 DIAGNOSIS — T83.511A URINARY TRACT INFECTION ASSOCIATED WITH INDWELLING URETHRAL CATHETER, INITIAL ENCOUNTER (HCC): Primary | ICD-10-CM

## 2022-02-19 LAB
BACTERIA: ABNORMAL /HPF
BILIRUBIN URINE: NEGATIVE
BLOOD, URINE: ABNORMAL
CASTS 2: ABNORMAL /LPF
CASTS UA: ABNORMAL /LPF
CHARACTER, URINE: ABNORMAL
COLOR: YELLOW
CRYSTALS, UA: ABNORMAL
EPITHELIAL CELLS, UA: ABNORMAL /HPF
GLUCOSE URINE: NEGATIVE MG/DL
KETONES, URINE: NEGATIVE
LEUKOCYTE ESTERASE, URINE: ABNORMAL
MISCELLANEOUS 2: ABNORMAL
NITRITE, URINE: NEGATIVE
PH UA: 6.5 (ref 5–9)
PROTEIN UA: 100
RBC URINE: ABNORMAL /HPF
RENAL EPITHELIAL, UA: ABNORMAL
SPECIFIC GRAVITY, URINE: 1.01 (ref 1–1.03)
UROBILINOGEN, URINE: 0.2 EU/DL (ref 0–1)
WBC UA: > 200 /HPF
YEAST: ABNORMAL

## 2022-02-19 PROCEDURE — 99283 EMERGENCY DEPT VISIT LOW MDM: CPT

## 2022-02-19 PROCEDURE — 51798 US URINE CAPACITY MEASURE: CPT

## 2022-02-19 PROCEDURE — 87186 SC STD MICRODIL/AGAR DIL: CPT

## 2022-02-19 PROCEDURE — 87077 CULTURE AEROBIC IDENTIFY: CPT

## 2022-02-19 PROCEDURE — 51702 INSERT TEMP BLADDER CATH: CPT

## 2022-02-19 PROCEDURE — 81001 URINALYSIS AUTO W/SCOPE: CPT

## 2022-02-19 PROCEDURE — 87086 URINE CULTURE/COLONY COUNT: CPT

## 2022-02-19 RX ORDER — LEVOFLOXACIN 500 MG/1
500 TABLET, FILM COATED ORAL 2 TIMES DAILY
Qty: 14 TABLET | Refills: 0 | Status: SHIPPED | OUTPATIENT
Start: 2022-02-19 | End: 2022-02-26

## 2022-02-19 ASSESSMENT — ENCOUNTER SYMPTOMS
RHINORRHEA: 0
WHEEZING: 0
EYE DISCHARGE: 0
VOMITING: 0
NAUSEA: 0
ABDOMINAL DISTENTION: 0
ABDOMINAL PAIN: 1
EYE PAIN: 0
SORE THROAT: 0
COUGH: 0
DIARRHEA: 0
SHORTNESS OF BREATH: 0

## 2022-02-19 ASSESSMENT — PAIN DESCRIPTION - LOCATION: LOCATION: ABDOMEN

## 2022-02-19 ASSESSMENT — PAIN - FUNCTIONAL ASSESSMENT: PAIN_FUNCTIONAL_ASSESSMENT: 0-10

## 2022-02-19 ASSESSMENT — PAIN DESCRIPTION - DESCRIPTORS: DESCRIPTORS: PRESSURE

## 2022-02-19 ASSESSMENT — PAIN SCALES - GENERAL: PAINLEVEL_OUTOF10: 6

## 2022-02-19 ASSESSMENT — PAIN DESCRIPTION - PAIN TYPE: TYPE: ACUTE PAIN

## 2022-02-19 ASSESSMENT — PAIN DESCRIPTION - ORIENTATION: ORIENTATION: LOWER

## 2022-02-19 NOTE — ED NOTES
Bladder scan completed, 430 ml noted in bladder. Garduno cath changed, cloudy yellow urine returned. Pt chino well.       Pillo Padronr, RN  02/19/22 0972

## 2022-02-19 NOTE — ED TRIAGE NOTES
Pt presents to the ED ambulatory from [facility]ige c/o a urinary catheter problem. Pt states that his catheter hasnt drained since 2-3pm. Pt states that he had his ortega placed approx 3 weeks ago. Pt is alert and oriented, respirations are equal and unlabored.

## 2022-02-19 NOTE — ED PROVIDER NOTES
[place] ENCOUNTER          Pt Name: Orma Rinne  MRN: [id number]  [place] 1935  Date of evaluation: 2/19/2022  Treating Resident Physician: Joe Ceja MD  Supervising Physician: Armida Hagen DO    CHIEF COMPLAINT       Chief Complaint   Patient presents with    Urinary Catheter Problem     History obtained from the patient. HISTORY OF PRESENT ILLNESS    HPI  Orma Rinne is a 80 y.o. male who presents to the emergency department for evaluation of urinary catheter problem. Patient states that at 02 100 his catheter stopped draining and he feels pressure to his bladder now. Patient states that he has had similar symptoms to this in the past requiring catheter being changed. Patient states the pain as pressure that is a 7 out of 10 on pain scale to bladder area. Patient denies any fever, vomiting, or diarrhea. The patient has no other acute complaints at this time. REVIEW OF SYSTEMS   Review of Systems   Constitutional: Negative for fatigue and fever. HENT: Negative for congestion, ear pain, rhinorrhea and sore throat. Eyes: Negative for pain and discharge. Respiratory: Negative for cough, shortness of breath and wheezing. Cardiovascular: Negative for chest pain, palpitations and leg swelling. Gastrointestinal: Positive for abdominal pain. Negative for abdominal distention, diarrhea, nausea and vomiting. Genitourinary: Positive for difficulty urinating. Negative for flank pain and frequency. Musculoskeletal: Negative for arthralgias. Neurological: Negative for dizziness, tremors, syncope, weakness and numbness.          PAST MEDICAL AND SURGICAL HISTORY     Past Medical History:   Diagnosis Date    Hyperlipidemia     Hypertension     Prostate cancer [facility])      Past Surgical History:   Procedure Laterality Date    COLONOSCOPY      CYSTOSCOPY Left 10/14/2021    CYSTOSCOPY EVACUATION OF CLOTS, EVACUATION OF BLADDER STONE, CHANNEL TURP  performed by Peri Davis MD at [address] [place] [address]      MT COLONOSCOPY FLX DX W/COLLJ Wagner  PFRMD Left 2018    COLONOSCOPY performed by Pablo Enrique MD at [place] [address]      seed implant    TONSILLECTOMY           MEDICATIONS   No current facility-administered medications for this encounter. Current Outpatient Medications:     levoFLOXacin (LEVAQUIN) 500 MG tablet, Take 1 tablet by mouth in the morning and at bedtime for 7 days, Disp: 14 tablet, Rfl: 0    tamsulosin (FLOMAX) 0.4 MG capsule, Take 1 capsule by mouth 2 times daily, Disp: 60 capsule, Rfl: 1    amLODIPine (NORVASC) 10 MG tablet, TAKE 1 TABLET EVERY DAY, Disp: 90 tablet, Rfl: 3    lisinopril (PRINIVIL;ZESTRIL) 10 MG tablet, TAKE 1 TABLET TWICE DAILY, Disp: 180 tablet, Rfl: 3    mirtazapine (REMERON) 30 MG tablet, Take 1 tablet by mouth nightly, Disp: 30 tablet, Rfl: 5    Acetaminophen (TYLENOL EXTRA STRENGTH PO), Take by mouth prn, Disp: , Rfl:     Calcium Carb-Cholecalciferol (CALCIUM 1000 + D PO), Take 1 tablet by mouth daily, Disp: , Rfl:     Apoaequorin (PREVAGEN) 10 MG CAPS, Take 10 mg by mouth daily, Disp: , Rfl:     UNABLE TO FIND, Kefir 8 0z daily, Disp: , Rfl:     MULTIPLE VITAMIN PO, Take by mouth daily, Disp: , Rfl:       SOCIAL HISTORY     Social History     Social History Narrative    Not on file     Social History     Tobacco Use    Smoking status: Former Smoker     Quit date:      Years since quittin.1    Smokeless tobacco: Never Used   Vaping Use    Vaping Use: Never used   Substance Use Topics    Alcohol use: No    Drug use: No         ALLERGIES   No Known Allergies      FAMILY HISTORY     Family History   Problem Relation Age of Onset    Diabetes Father          PREVIOUS RECORDS   Previous records reviewed:  Today    PHYSICAL EXAM     ED Triage Vitals [22 0355]   BP Temp Temp complication secondary to BPH. Plan:   Urinalysis with reflex to culture. Change Garduno catheter        ED RESULTS   Laboratory results:  Labs Reviewed   URINE WITH REFLEXED MICRO - Abnormal; Notable for the following components:       Result Value    Blood, Urine LARGE (*)     Protein,  (*)     Leukocyte Esterase, Urine LARGE (*)     Character, Urine TURBID (*)     All other components within normal limits   CULTURE, REFLEXED, URINE    Narrative:     Source: urine, clean catch       Site: clean void          Current Antibiotics: not stated       Radiologic studies results:  No orders to display       ED Medications administered this visit: Medications - No data to display      ED COURSE      Patient had significant white sediment and urinary catheter. Patient required urinary catheter exchange. Patient tolerated well. Patient urine was sent for analysis and noted to have UTI at this time. Based on previous cultures patient will be started on Levaquin and current cultures are pending at this time. Patient will be discharged home with precautions. Strict return precautions and follow up instructions were discussed with the patient prior to discharge, with which the patient agrees. MEDICATION CHANGES     Discharge Medication List as of 2/19/2022  5:12 AM      START taking these medications    Details   levoFLOXacin (LEVAQUIN) 500 MG tablet Take 1 tablet by mouth in the morning and at bedtime for 7 days, Disp-14 tablet, R-0Normal               FINAL DISPOSITION     Final diagnoses:   Urinary tract infection associated with indwelling urethral catheter, initial encounter ([facility] [address]     Condition: condition: good  Dispo: Discharge to home      This transcription was electronically signed. Parts of this transcriptions may have been dictated by use of voice recognition software and electronically transcribed, and parts may have been transcribed with the assistance of an ED scribe.  The transcription may contain errors not detected in proofreading. Please refer to my supervising physician's documentation if my documentation differs.     Electronically Signed: Eliu Urrutia MD, 02/19/22, 5:33 AM       Eliu Urrutia MD  Resident  02/19/22 [id number]

## 2022-02-21 ENCOUNTER — TELEPHONE (OUTPATIENT)
Dept: UROLOGY | Age: 87
End: 2022-02-21

## 2022-02-21 NOTE — TELEPHONE ENCOUNTER
Patient wanted urine results reviewed. He was put on levaquin in the ER.  The ortega was occluded and changed on 02/19/2022

## 2022-02-22 ENCOUNTER — TELEPHONE (OUTPATIENT)
Dept: INTERNAL MEDICINE CLINIC | Age: 87
End: 2022-02-22

## 2022-02-22 LAB
ORGANISM: ABNORMAL
ORGANISM: ABNORMAL
URINE CULTURE REFLEX: ABNORMAL
URINE CULTURE REFLEX: ABNORMAL

## 2022-02-22 NOTE — TELEPHONE ENCOUNTER
Pt notified and script will be sent over for amoxicillin to [facility] on [address]

## 2022-02-22 NOTE — TELEPHONE ENCOUNTER
I called in script to [facility] on [address]. Pt knows to take this in addition to what ER gave him.

## 2022-02-22 NOTE — TELEPHONE ENCOUNTER
----- Message from Quang Mehta MD sent at 2/22/2022  7:32 AM EST -----  Tell him he is growing 2 different organisms; one of which is sensitive to his current antibiotic, one not; add amoxicillin 500 q8h x 5 days

## 2022-02-23 RX ORDER — AMOXICILLIN 500 MG/1
500 CAPSULE ORAL EVERY 8 HOURS
Qty: 15 CAPSULE | Refills: 0 | OUTPATIENT
Start: 2022-02-23 | End: 2022-02-28

## 2022-02-23 NOTE — TELEPHONE ENCOUNTER
Patient stated DR Fredricka Mcardle added amoxicillin to the levaquin he is taking. The urine grew a second bacteria.

## 2022-02-25 NOTE — ED PROVIDER NOTES
[address]    Pt Name: Rocky Vivar  MRN: [id number]  [place] 1935        I personally saw and examined the patient. I have reviewed and agree with the Resident findings, including all diagnostic interpretations and treatment plans as written. I was present for the key portion of any procedures performed and the inclusive time noted in any critical care statement. History: This patient was seen with Alma Delia Gore, resident physician. This is an 80-year-old male with a chronic indwelling Garduno catheter. Apparently obstructed and presented with urinary retention. The urine looks like mud. We have replaced the catheter sent the urine. We are adding on additional coverage with Levaquin. Patient felt better following catheter replacement.                 Kaiden Dixon, DO  02/25/22 7402

## 2022-03-01 ENCOUNTER — OFFICE VISIT (OUTPATIENT)
Dept: INTERNAL MEDICINE CLINIC | Age: 87
End: 2022-03-01
Payer: MEDICARE

## 2022-03-01 ENCOUNTER — NURSE ONLY (OUTPATIENT)
Dept: LAB | Age: 87
End: 2022-03-01

## 2022-03-01 VITALS
TEMPERATURE: 97.4 F | SYSTOLIC BLOOD PRESSURE: 132 MMHG | WEIGHT: 174 LBS | DIASTOLIC BLOOD PRESSURE: 64 MMHG | HEIGHT: 72 IN | BODY MASS INDEX: 23.57 KG/M2

## 2022-03-01 DIAGNOSIS — I10 PRIMARY HYPERTENSION: Primary | ICD-10-CM

## 2022-03-01 DIAGNOSIS — I10 PRIMARY HYPERTENSION: ICD-10-CM

## 2022-03-01 DIAGNOSIS — B35.1 ONYCHOMYCOSIS: ICD-10-CM

## 2022-03-01 DIAGNOSIS — R33.8 ACUTE URINARY RETENTION: ICD-10-CM

## 2022-03-01 LAB
ANION GAP SERPL CALCULATED.3IONS-SCNC: 9 MEQ/L (ref 8–16)
BUN BLDV-MCNC: 18 MG/DL (ref 7–22)
CALCIUM SERPL-MCNC: 9.6 MG/DL (ref 8.5–10.5)
CHLORIDE BLD-SCNC: 101 MEQ/L (ref 98–111)
CO2: 29 MEQ/L (ref 23–33)
CREAT SERPL-MCNC: 0.8 MG/DL (ref 0.4–1.2)
GFR SERPL CREATININE-BSD FRML MDRD: > 90 ML/MIN/1.73M2
GLUCOSE BLD-MCNC: 111 MG/DL (ref 70–108)
POTASSIUM SERPL-SCNC: 4.1 MEQ/L (ref 3.5–5.2)
SODIUM BLD-SCNC: 139 MEQ/L (ref 135–145)

## 2022-03-01 PROCEDURE — G8427 DOCREV CUR MEDS BY ELIG CLIN: HCPCS | Performed by: INTERNAL MEDICINE

## 2022-03-01 PROCEDURE — G8484 FLU IMMUNIZE NO ADMIN: HCPCS | Performed by: INTERNAL MEDICINE

## 2022-03-01 PROCEDURE — 4040F PNEUMOC VAC/ADMIN/RCVD: CPT | Performed by: INTERNAL MEDICINE

## 2022-03-01 PROCEDURE — 93000 ELECTROCARDIOGRAM COMPLETE: CPT | Performed by: INTERNAL MEDICINE

## 2022-03-01 PROCEDURE — 1036F TOBACCO NON-USER: CPT | Performed by: INTERNAL MEDICINE

## 2022-03-01 PROCEDURE — G8420 CALC BMI NORM PARAMETERS: HCPCS | Performed by: INTERNAL MEDICINE

## 2022-03-01 PROCEDURE — 99213 OFFICE O/P EST LOW 20 MIN: CPT | Performed by: INTERNAL MEDICINE

## 2022-03-01 PROCEDURE — 1123F ACP DISCUSS/DSCN MKR DOCD: CPT | Performed by: INTERNAL MEDICINE

## 2022-03-01 RX ORDER — PHENOL 1.4 %
AEROSOL, SPRAY (ML) MUCOUS MEMBRANE NIGHTLY
COMMUNITY

## 2022-03-01 NOTE — PROGRESS NOTES
Amanda Garcia (:  1935) is a 80 y.o. male,Established patient, here for evaluation of the following chief complaint(s): Other (big toenail is very thick-pt having hard time cutting-maybe wants to have podiatry try to cut thinks he has a fungus), Other (still has a ortega), Hypertension, and Other (arms shake or jerk when holding something)         ASSESSMENT/PLAN:  1. Primary hypertension- controlled; check labs; ekg reviewed; nsr, pvc  -     EKG 12 Lead  -     Basic Metabolic Panel; Future  2. Onychomycosis- nails long; refer podiatry  -     External Referral To Podiatry  3. Acute urinary retention- has chronic ortega; to see Urology; they discussed chronic ortega vs self cath vs another procedure to enlarge stricture with self cath once daily      Return in about 6 months (around 2022). Subjective   SUBJECTIVE/OBJECTIVE:  HPI pt with hbp, othewise good health; had hx prostate ca, lately urinary retention; had ortega plug up several times with urgent replacement. He exercises on reg basis with walking    Review of Systems    Twelve point ROS completed and found to be negative except as noted above.       Objective   Physical Exam    /64 (Site: Left Upper Arm)   Temp 97.4 °F (36.3 °C)   Ht 6' 0.01\" (1.829 m)   Wt 174 lb (78.9 kg)   BMI 23.59 kg/m²       General appearance - alert, well appearing, and in no distress    Mental Status - alert, oriented to person, place, and time          Neck - supple, no significant adenopathy        Chest - clear to auscultation, no wheezes, rales or rhonchi, symmetric air entry     Heart - normal rate, regular rhythm, normal S1, S2, no murmurs, rubs, clicks or gallops     Abdomen - soft, nontender, nondistended, no masses or organomegaly         Neurological - alert, oriented, normal speech, no focal findings or movement disorder noted     Musculoskeletal -   msk exam:[id number]}     Extremities - toenails great toes very long; thick; feet slight edema     Skin - normal coloration and turgor, no rashes, no suspicious skin lesions noted            An electronic signature was used to authenticate this note.     --Renee Ratliff MD

## 2022-03-02 ENCOUNTER — TELEPHONE (OUTPATIENT)
Dept: INTERNAL MEDICINE CLINIC | Age: 87
End: 2022-03-02

## 2022-03-11 ENCOUNTER — NURSE ONLY (OUTPATIENT)
Dept: UROLOGY | Age: 87
End: 2022-03-11
Payer: MEDICARE

## 2022-03-11 DIAGNOSIS — R33.9 URINARY RETENTION: ICD-10-CM

## 2022-03-11 PROCEDURE — 51702 INSERT TEMP BLADDER CATH: CPT | Performed by: UROLOGY

## 2022-03-11 NOTE — PATIENT INSTRUCTIONS
Catheter Care:  1. Use a leg bag during the day and a larger drainage bag at night. See instructions that have been placed with your urinary supplies. 2. The best way to clean the ortega is with soap and water in the shower. 3. Placement of Vaseline ointment around the tip of the penis where the catheter inserts will help prevent irritation. Additional Ortega Catheter Care information:  The tube draining your urine is called a Ortega Catheter. The catheter is held in place by a balloon inside your bladder. Urine will drain continuously from the bladder into a bag. At night, you can wear a larger drainage bag to collect the urine. Keep this bag in a container, such as a plastic waste basket, in case of accidental leakage. During the day, you may wear a smaller leg bag that fits onto your outer thigh. This smaller bag is discrete and can be worn under loose fitting clothes. The smaller bag will fill quickly and should be emptied every few hours depending on the urinary output  . Note: It is not unusual for urine to leak around the Ortega catheter. If leakage is severe, or if leakage is accompanied by spasms in the bladder (a sharp pain/pressure in the pubic area followed by urinary leakage), a bladder antispasmodic medication may be called into your pharmacy. Please contact the office during normal business hours if you would like this called. Please be aware that such antispasmodics can be constipating, so a stool softener is recommended as well. To empty the leg bag, first wash your hands. Remove the cap on the bottom of the leg bag if there is one (some brands do not have this cap), and then slide the valve to empty. When completed, close the valve, replace the cap (if your brand of bag had one) and wash your hands. Follow the same directions to drain the larger bag. To change from one bag to another, first wash your hands.  Empty the bag; carefully separate the catheter tubing from the tubing on the bag (note the two different colors that can help you distinguish where the tubing separates). Next, connect the catheter to the new drainage tubing. Try not to touch the tip of the catheter or the tip of the drainage tubing If possible. When finished, wash your hands again. Taping the catheter to your thigh will lessen the discomfort from the catheter. Use adhesive or \"silk-like\" tape (not paper tape) that is 3 inches wide. Switching to a different thigh each day will prevent skin blistering. To clean the urinary collection bag, use warm soapy water, rinse clear, then clean with a solution of one tablespoon of vinegar to one quart of water and again rinse clear.  Be sure to leave the drainage spout open while hanging the bag to dry

## 2022-03-11 NOTE — PROGRESS NOTES
Patient has given me verbal consent to perform catheter change Yes    Does patient have latex allergy? No  Does patient have shellfish or betadine allergy? No      Following Dr. Molina [place] plan of care. 16 Fr Catheter changed without difficulty. Once balloon was deflated, removed catheter without difficulty. Cleansed urethral opening with betadine swab. 16 Fr Coude ortega was inserted using sterile water-soluble lubricant without difficulty. Catheter was flushed with 70cc water ensuring return and inflated balloon with 10 ml of water. Ortega Catheter was hooked up to leg bag with straps. Foreskin reduced back down? Yes    Pt had white, thick discharge around his penis when I pulled the foreskin back. Advised him to clean once a day with warm soap and water. Patient instructed on how to drain catheter bags. If patient was given a leg bag, patient was instructed to keep bag above the knee to prevent pulling on catheter. Pt notified if catheter is pulled on may cause pain and bleeding. Patient was also instructed on how to switch from leg bag to overnight bag. Supplies were provided by office    Bard order given?  No

## 2022-03-19 ENCOUNTER — HOSPITAL ENCOUNTER (EMERGENCY)
Age: 87
Discharge: HOME | End: 2022-03-19
Attending: EMERGENCY MEDICINE
Payer: MEDICARE

## 2022-03-19 VITALS
RESPIRATION RATE: 16 BRPM | OXYGEN SATURATION: 95 % | TEMPERATURE: 97.8 F | DIASTOLIC BLOOD PRESSURE: 82 MMHG | HEART RATE: 86 BPM | SYSTOLIC BLOOD PRESSURE: 146 MMHG

## 2022-03-19 DIAGNOSIS — N39.0 URINARY TRACT INFECTION ASSOCIATED WITH CATHETERIZATION OF URINARY TRACT, UNSPECIFIED INDWELLING URINARY CATHETER TYPE, INITIAL ENCOUNTER (HCC): Primary | ICD-10-CM

## 2022-03-19 DIAGNOSIS — T83.511A URINARY TRACT INFECTION ASSOCIATED WITH CATHETERIZATION OF URINARY TRACT, UNSPECIFIED INDWELLING URINARY CATHETER TYPE, INITIAL ENCOUNTER (HCC): Primary | ICD-10-CM

## 2022-03-19 DIAGNOSIS — N13.9 URINARY OBSTRUCTION: ICD-10-CM

## 2022-03-19 LAB
ANION GAP SERPL CALCULATED.3IONS-SCNC: 11 MEQ/L (ref 8–16)
BACTERIA: ABNORMAL /HPF
BASOPHILS # BLD: 0.2 %
BASOPHILS ABSOLUTE: 0 THOU/MM3 (ref 0–0.1)
BILIRUBIN URINE: NEGATIVE
BLOOD, URINE: ABNORMAL
BUN BLDV-MCNC: 17 MG/DL (ref 7–22)
CALCIUM SERPL-MCNC: 9.4 MG/DL (ref 8.5–10.5)
CASTS 2: ABNORMAL /LPF
CASTS UA: ABNORMAL /LPF
CHARACTER, URINE: ABNORMAL
CHLORIDE BLD-SCNC: 102 MEQ/L (ref 98–111)
CO2: 25 MEQ/L (ref 23–33)
COLOR: YELLOW
CREAT SERPL-MCNC: 0.8 MG/DL (ref 0.4–1.2)
CRYSTALS, UA: ABNORMAL
EOSINOPHIL # BLD: 0.2 %
EOSINOPHILS ABSOLUTE: 0 THOU/MM3 (ref 0–0.4)
EPITHELIAL CELLS, UA: ABNORMAL /HPF
ERYTHROCYTE [DISTWIDTH] IN BLOOD BY AUTOMATED COUNT: 14.3 % (ref 11.5–14.5)
ERYTHROCYTE [DISTWIDTH] IN BLOOD BY AUTOMATED COUNT: 48 FL (ref 35–45)
GFR SERPL CREATININE-BSD FRML MDRD: > 90 ML/MIN/1.73M2
GLUCOSE BLD-MCNC: 125 MG/DL (ref 70–108)
GLUCOSE URINE: NEGATIVE MG/DL
HCT VFR BLD CALC: 37.4 % (ref 42–52)
HEMOGLOBIN: 12.4 GM/DL (ref 14–18)
IMMATURE GRANS (ABS): 0.03 THOU/MM3 (ref 0–0.07)
IMMATURE GRANULOCYTES: 0.3 %
KETONES, URINE: NEGATIVE
LACTIC ACID, SEPSIS: 1.3 MMOL/L (ref 0.5–1.9)
LEUKOCYTE ESTERASE, URINE: ABNORMAL
LYMPHOCYTES # BLD: 6.9 %
LYMPHOCYTES ABSOLUTE: 0.6 THOU/MM3 (ref 1–4.8)
MCH RBC QN AUTO: 30.5 PG (ref 26–33)
MCHC RBC AUTO-ENTMCNC: 33.2 GM/DL (ref 32.2–35.5)
MCV RBC AUTO: 91.9 FL (ref 80–94)
MISCELLANEOUS 2: ABNORMAL
MONOCYTES # BLD: 11.7 %
MONOCYTES ABSOLUTE: 1 THOU/MM3 (ref 0.4–1.3)
NITRITE, URINE: NEGATIVE
NUCLEATED RED BLOOD CELLS: 0 /100 WBC
OSMOLALITY CALCULATION: 278.7 MOSMOL/KG (ref 275–300)
PH UA: 7 (ref 5–9)
PLATELET # BLD: 396 THOU/MM3 (ref 130–400)
PMV BLD AUTO: 8.5 FL (ref 9.4–12.4)
POTASSIUM SERPL-SCNC: 3.5 MEQ/L (ref 3.5–5.2)
PROCALCITONIN: 0.09 NG/ML (ref 0.01–0.09)
PROTEIN UA: ABNORMAL
RBC # BLD: 4.07 MILL/MM3 (ref 4.7–6.1)
RBC URINE: ABNORMAL /HPF
RENAL EPITHELIAL, UA: ABNORMAL
SEG NEUTROPHILS: 80.7 %
SEGMENTED NEUTROPHILS ABSOLUTE COUNT: 7.1 THOU/MM3 (ref 1.8–7.7)
SODIUM BLD-SCNC: 138 MEQ/L (ref 135–145)
SPECIFIC GRAVITY, URINE: 1.01 (ref 1–1.03)
UROBILINOGEN, URINE: 0.2 EU/DL (ref 0–1)
WBC # BLD: 8.8 THOU/MM3 (ref 4.8–10.8)
WBC UA: > 200 /HPF
YEAST: ABNORMAL

## 2022-03-19 PROCEDURE — 36415 COLL VENOUS BLD VENIPUNCTURE: CPT

## 2022-03-19 PROCEDURE — 51702 INSERT TEMP BLADDER CATH: CPT

## 2022-03-19 PROCEDURE — 99282 EMERGENCY DEPT VISIT SF MDM: CPT

## 2022-03-19 PROCEDURE — 87077 CULTURE AEROBIC IDENTIFY: CPT

## 2022-03-19 PROCEDURE — 87040 BLOOD CULTURE FOR BACTERIA: CPT

## 2022-03-19 PROCEDURE — 80048 BASIC METABOLIC PNL TOTAL CA: CPT

## 2022-03-19 PROCEDURE — 87186 SC STD MICRODIL/AGAR DIL: CPT

## 2022-03-19 PROCEDURE — 81001 URINALYSIS AUTO W/SCOPE: CPT

## 2022-03-19 PROCEDURE — 85025 COMPLETE CBC W/AUTO DIFF WBC: CPT

## 2022-03-19 PROCEDURE — 84145 PROCALCITONIN (PCT): CPT

## 2022-03-19 PROCEDURE — 83605 ASSAY OF LACTIC ACID: CPT

## 2022-03-19 PROCEDURE — 87086 URINE CULTURE/COLONY COUNT: CPT

## 2022-03-19 RX ORDER — CEPHALEXIN 500 MG/1
500 CAPSULE ORAL 2 TIMES DAILY
Qty: 14 CAPSULE | Refills: 0 | Status: SHIPPED | OUTPATIENT
Start: 2022-03-19 | End: 2022-03-26

## 2022-03-19 ASSESSMENT — ENCOUNTER SYMPTOMS
EYES NEGATIVE: 1
ABDOMINAL PAIN: 1
NAUSEA: 0
RESPIRATORY NEGATIVE: 1
ALLERGIC/IMMUNOLOGIC NEGATIVE: 1

## 2022-03-19 ASSESSMENT — PAIN DESCRIPTION - LOCATION: LOCATION: PENIS

## 2022-03-19 ASSESSMENT — PAIN DESCRIPTION - FREQUENCY: FREQUENCY: CONTINUOUS

## 2022-03-19 ASSESSMENT — PAIN SCALES - GENERAL: PAINLEVEL_OUTOF10: 4

## 2022-03-19 ASSESSMENT — PAIN - FUNCTIONAL ASSESSMENT: PAIN_FUNCTIONAL_ASSESSMENT: 0-10

## 2022-03-19 ASSESSMENT — PAIN DESCRIPTION - DESCRIPTORS: DESCRIPTORS: BURNING

## 2022-03-19 ASSESSMENT — PAIN DESCRIPTION - PAIN TYPE: TYPE: ACUTE PAIN

## 2022-03-19 NOTE — ED NOTES
Patient to ED for obstructed ortega catheter. New catheter placed 1 week ago. Patient states bladder stopped draining today.  Patient having burning at the meatus      Shelly Frias RN  03/19/22 3747

## 2022-03-19 NOTE — ED NOTES
Garduno catheter replaced with a 16Fr coude. Patient tolerated well.  Urine return noted, yellow and cloudy      Ying Oseguera RN  03/19/22 4929

## 2022-03-19 NOTE — ED PROVIDER NOTES
Raissa ENCOUNTER          Pt Name: Christiano Jin  MRN: [id number]  [place] 1935  Date of evaluation: 3/19/2022  Treating Resident Physician: Pastora Wallis MD  Supervising Physician: Dr. Cuba Paz       Chief Complaint   Patient presents with    Urinary Catheter Problem     History obtained from the patient. HISTORY OF PRESENT ILLNESS    HPI  Christiano Jin is a 80 y.o. male who presents to the emergency department for evaluation of urinary retention. He has a h/o prostate cancer s/p radiation with chronic indwelling catheter 2/2 urethral strictures. States that he has decreased output from the ortega and has had increasing sediment in the urine. Also feels some burning in his urethra. Feeling increasing suprapubic fullness. Denies fever, flank pain, N/V, fevers, confusion, AMS. The patient has no other acute complaints at this time. REVIEW OF SYSTEMS   Review of Systems   Constitutional: Negative. HENT: Negative. Eyes: Negative. Respiratory: Negative. Cardiovascular: Negative. Gastrointestinal: Positive for abdominal pain. Negative for nausea. Endocrine: Negative. Genitourinary: Positive for difficulty urinating and dysuria. Negative for flank pain, frequency, genital sores, hematuria, penile discharge, penile pain, testicular pain and urgency. Musculoskeletal: Negative. Skin: Negative. Allergic/Immunologic: Negative. Neurological: Negative. Hematological: Negative. Psychiatric/Behavioral: Negative.           PAST MEDICAL AND SURGICAL HISTORY     Past Medical History:   Diagnosis Date    Hyperlipidemia     Hypertension     Prostate cancer [facility])      Past Surgical History:   Procedure Laterality Date    COLONOSCOPY      CYSTOSCOPY Left 10/14/2021    CYSTOSCOPY EVACUATION OF CLOTS, EVACUATION OF BLADDER STONE, CHANNEL TURP  performed by Jane Johnson MD at [facility] OR    HEMORRHOID SURGERY      HERNIA REPAIR      MN COLONOSCOPY FLX DX W/COLLJ SPEC WHEN PFRMD Left 2018    COLONOSCOPY performed by Luna Delcid MD at [place] [address]      seed implant    TONSILLECTOMY           MEDICATIONS   No current facility-administered medications for this encounter. Current Outpatient Medications:     cephALEXin (KEFLEX) 500 MG capsule, Take 1 capsule by mouth 2 times daily for 7 days, Disp: 14 capsule, Rfl: 0    Melatonin 10 MG TABS, Take by mouth at bedtime, Disp: , Rfl:     tamsulosin (FLOMAX) 0.4 MG capsule, Take 1 capsule by mouth 2 times daily, Disp: 60 capsule, Rfl: 1    amLODIPine (NORVASC) 10 MG tablet, TAKE 1 TABLET EVERY DAY, Disp: 90 tablet, Rfl: 3    lisinopril (PRINIVIL;ZESTRIL) 10 MG tablet, TAKE 1 TABLET TWICE DAILY, Disp: 180 tablet, Rfl: 3    mirtazapine (REMERON) 30 MG tablet, Take 1 tablet by mouth nightly, Disp: 30 tablet, Rfl: 5    Acetaminophen (TYLENOL EXTRA STRENGTH PO), Take by mouth prn, Disp: , Rfl:     Calcium Carb-Cholecalciferol (CALCIUM 1000 + D PO), Take 1 tablet by mouth daily, Disp: , Rfl:     Apoaequorin (PREVAGEN) 10 MG CAPS, Take 10 mg by mouth daily, Disp: , Rfl:     UNABLE TO FIND, Kefir 8 0z daily, Disp: , Rfl:     MULTIPLE VITAMIN PO, Take by mouth daily, Disp: , Rfl:       SOCIAL HISTORY     Social History     Social History Narrative    Not on file     Social History     Tobacco Use    Smoking status: Former Smoker     Quit date: 1968     Years since quittin.2    Smokeless tobacco: Never Used   Vaping Use    Vaping Use: Never used   Substance Use Topics    Alcohol use: No    Drug use: No         ALLERGIES   No Known Allergies      FAMILY HISTORY     Family History   Problem Relation Age of Onset    Diabetes Father          PREVIOUS RECORDS   Previous records reviewed.         PHYSICAL EXAM     ED Triage Vitals [22 1005]   BP Temp Temp src Pulse Resp SpO2 Height Weight   (!) 168/106 -- -- 113 20 96 % -- --     Initial vital signs and nursing assessment reviewed and abnormal from Tachycardia. There is no height or weight on file to calculate BMI. Pulsoximetry is normal per my interpretation. Additional Vital Signs:  Vitals:    03/19/22 1114   BP: (!) 146/82   Pulse: 86   Resp: 16   Temp: 97.8 °F (36.6 °C)   SpO2: 95%       Physical Exam  Constitutional:       General: He is not in acute distress. Appearance: Normal appearance. He is normal weight. HENT:      Head: Normocephalic and atraumatic. Right Ear: External ear normal.      Left Ear: External ear normal.      Nose: Nose normal.      Mouth/Throat:      Mouth: Mucous membranes are moist.      Pharynx: Oropharynx is clear. Eyes:      Extraocular Movements: Extraocular movements intact. Conjunctiva/sclera: Conjunctivae normal.   Cardiovascular:      Rate and Rhythm: Normal rate and regular rhythm. Pulses: Normal pulses. Heart sounds: Normal heart sounds. Pulmonary:      Effort: Pulmonary effort is normal.      Breath sounds: Normal breath sounds. Abdominal:      Palpations: Abdomen is soft. Genitourinary:     Penis: Normal.       Comments: Garduno catheter in place connected to a bag which contains yellow urine with some sediment. Musculoskeletal:         General: Normal range of motion. Cervical back: Normal range of motion. Skin:     General: Skin is warm and dry. Capillary Refill: Capillary refill takes less than 2 seconds. Neurological:      General: No focal deficit present. Mental Status: He is alert and oriented to person, place, and time. Psychiatric:         Mood and Affect: Mood normal.         Behavior: Behavior normal.             MEDICAL DECISION MAKING   Initial Assessment:  This is an 68-year-old male with past medical history of prostate cancer status post radiation with urethral strictures and chronic indwelling Garduno presenting with decreased drainage from the Garduno and suprapubic fullness. Likely some obstruction in the Garduno. Also some concern for UTI given UA findings. This is a complicated case of UTI due to patient being a male with chronic indwelling catheter. Afebrile, vital signs stable. Tachycardic on initial presentation but no longer tachycardic. I suspect that this was secondary due to discomfort from urinary retention. No white count. No gross electrolyte abnormalities. No metabolic acidosis present. No CVA tenderness. Does not appear toxic. 1. Complicated UTI  2. Acute urinary retention. Plan:    [facility].   [facility]   UA   CBC, BMP   Discharge home with course of antibiotics   Follow-up with urology        ED RESULTS   Laboratory results:  Labs Reviewed   URINE WITH REFLEXED MICRO - Abnormal; Notable for the following components:       Result Value    Blood, Urine LARGE (*)     Protein, UA TRACE (*)     Leukocyte Esterase, Urine LARGE (*)     Character, Urine TURBID (*)     All other components within normal limits   CBC WITH AUTO DIFFERENTIAL - Abnormal; Notable for the following components:    RBC 4.07 (*)     Hemoglobin 12.4 (*)     Hematocrit 37.4 (*)     RDW-SD 48.0 (*)     MPV 8.5 (*)     Lymphocytes Absolute 0.6 (*)     All other components within normal limits   BASIC METABOLIC PANEL - Abnormal; Notable for the following components:    Glucose 125 (*)     All other components within normal limits   CULTURE, REFLEXED, URINE    Narrative:     Source: urine, clean catch       Site:           Current Antibiotics: none   CULTURE, BLOOD 1   CULTURE, BLOOD 2   LACTATE, SEPSIS   PROCALCITONIN   ANION GAP   GLOMERULAR FILTRATION RATE, ESTIMATED   OSMOLALITY       Radiologic studies results:  No orders to display       ED Medications administered this visit: Medications - No data to display      ED COURSE     ED Course as of 03/19/22 1231   Sat Mar 19, 2022   1209 CBC with Auto Differential(!):    WBC 8.8   RBC 4.07(!) Hemoglobin Quant 12.4(!)   Hematocrit 37.4(!)   MCV 91.9   MCH 30.5   MCHC 33.2   RDW-CV 14.3   RDW-SD 48.0(!)   Platelet Count 272   MPV 8.5(!)   Seg Neutrophils 80.7   Lymphocytes 6.9   Monocytes 11.7   Eosinophils 0.2   Basophils 0.2   Immature Granulocytes 0.3   Segs Absolute 7.1   Lymphocytes Absolute 0.6(!)   Monocytes Absolute 1.0   Eosinophils Absolute 0.0   Basophils Absolute 0.0   Immature Grans (Abs) 0.03   Nucleated Red Blood Cells 0  No white count, mild normocytic anemia. [JT]   1209 Urine with Reflexed Micro(!):    Glucose, UA NEGATIVE   Bilirubin, Urine NEGATIVE   Ketones, Urine NEGATIVE   Specific Gravity, Urine 1.010   Blood, Urine LARGE(!)   pH, UA 7.0   Protein, UA TRACE(!)   Urobilinogen, Urine 0.2   Nitrite, Urine NEGATIVE   Leukocyte Esterase, Urine LARGE(!)   Color, UA YELLOW   Character, Urine TURBID(!)   RBC, UA 10-15   WBC, UA > 200   Epithelial Cells, UA NONE SEEN   Bacteria, UA MODERATE   Casts UA NONE SEEN   Crystals, UA NONE SEEN   Renal Epithelial, UA NONE SEEN   Yeast, Urine MOD BUDDING   CASTS 2 NONE SEEN   MISCELLANEOUS 2 NONE SEEN  UA showing signs of UTI. Garduno catheter exchanged. [JT]      ED Course User Index  [JT] Mick Barker MD     Strict return precautions and follow up instructions were discussed with the patient prior to discharge, with which the patient agrees. MEDICATION CHANGES     New Prescriptions    CEPHALEXIN (KEFLEX) 500 MG CAPSULE    Take 1 capsule by mouth 2 times daily for 7 days         FINAL DISPOSITION     Final diagnoses:   Urinary tract infection associated with catheterization of urinary tract, unspecified indwelling urinary catheter type, initial encounter [facility])   Urinary obstruction     Condition: condition: fair  Dispo: Discharge to home      This transcription was electronically signed.  Parts of this transcriptions may have been dictated by use of voice recognition software and electronically transcribed, and parts may have been transcribed with the assistance of an ED scribe. The transcription may contain errors not detected in proofreading. Please refer to my supervising physician's documentation if my documentation differs.     Electronically Signed: Mick Barker MD, 03/19/22, 12:31 PM          Mick Barker MD  Resident  03/19/22 9487

## 2022-03-19 NOTE — ED PROVIDER NOTES
[address] [facility] [address] [address] EMERGENCY DEPT  ED Attending Physician Attestation     I performed a history and physical examination of the patient and discussed management with the Resident. I reviewed the Resident's note and agree with the documented findings and plan of care. Any areas of disagreement are noted on the chart. I was personally present for the key portions of any procedures and have documented in the chart those procedures where I was not present during the key portions. I have reviewed the emergency nurses triage note and agree with the chief complaint, past medical history, past surgical history, allergies, medications, social and family history as documented unless otherwise noted below. Well-appearing nontoxic 42-year-old male with indwelling catheter placed a week ago  Presents with urinary retention/plugged Garduno catheter. No nausea vomiting, no flank pain or back pain, some lower abdominal pain, no fevers. HEENT WNL  Neck supple  Chest clear HRR  Abdomen soft nontender nondistended, this is After the Garduno was irrigated and is now draining; no CVA tenderness  Genitourinary otherwise unremarkable  Lower extremities no edema  Neuro awake and alert    Clinically, no evidence of pyonephritis, sepsis, or any intra-abdominal infection. Patient feels better after Garduno catheter placement. Labs are unremarkable.   He is stable for discharge    Josh Rodriguez [facility]  Attending Emergency Physician       Ryan Crooks, DO  03/19/22 9920

## 2022-03-19 NOTE — ED NOTES
Urine sample obtained and sent to lab.  Patients bladder irrigated until clear     Lakhwinder Hanna RN  03/19/22 7095

## 2022-03-21 ENCOUNTER — TELEPHONE (OUTPATIENT)
Dept: UROLOGY | Age: 87
End: 2022-03-21

## 2022-03-21 ENCOUNTER — TELEPHONE (OUTPATIENT)
Dept: INTERNAL MEDICINE CLINIC | Age: 87
End: 2022-03-21

## 2022-03-21 LAB
ORGANISM: ABNORMAL
URINE CULTURE REFLEX: ABNORMAL

## 2022-03-21 NOTE — TELEPHONE ENCOUNTER
Pt was in ER for UTI. He is following up with Dr Keron Doherty regarding this. I advised him we really do not need to see as long as Dr. Keron Doherty following up. I advised him can call if something else crops up. He was in agreement with that plan.

## 2022-03-21 NOTE — TELEPHONE ENCOUNTER
Patient was in the ER on 03/19/2022. The ortega was occluded and had a UTI. He was prescribed Keflex. He wanted his urine results reviewed. He feels much better and the ortega is patent.

## 2022-03-23 NOTE — PROGRESS NOTES
Pharmacy Note  ED Culture Follow-up    Melania Mccabe is a 80 y.o. male. Allergies: Patient has no known allergies. Labs:  Lab Results   Component Value Date    BUN 17 03/19/2022    CREATININE 0.8 03/19/2022    WBC 8.8 03/19/2022     CrCl cannot be calculated (Unknown ideal weight.). Current antimicrobials:   Keflex 500 mg BID x 7 days. ASSESSMENT:  Micro results:   Urine culture: positive for Proteus mirabilis     PLAN:  Need for intervention:  Inform urology of culture result  Discussed with: RANDALL Wiley  Chosen treatment:    See telephone call with urology on 3/21/22 - Patient is feeling better and was told to finish Keflex. Will notify urology of culture results. Patient response:   No need to contact patient    Called/sent in prescription to: Not applicable    Please call with any questions.  Torie Roberts [facility], PharmD 8:05 PM 3/22/2022

## 2022-03-25 LAB
BLOOD CULTURE, ROUTINE: NORMAL
BLOOD CULTURE, ROUTINE: NORMAL

## 2022-03-30 ENCOUNTER — TELEPHONE (OUTPATIENT)
Dept: UROLOGY | Age: 87
End: 2022-03-30

## 2022-03-30 NOTE — TELEPHONE ENCOUNTER
Milton Amezcua from Dr Gala Nicholson office stated the tooth is infected and wanted to know from a urology standpoint when antibiotics should be started. They typically start antibiotics 3 days before or the day of the extraction for 7 day course. Please advise.  Thank you

## 2022-03-30 NOTE — TELEPHONE ENCOUNTER
Patient has an infected tooth and having an tooth extraction on 04/20/2022. The dentist told him it could be causing his UTI and was going to call this office for recommendation.     Dr Rock Luciano is his dentist [phone]

## 2022-04-01 NOTE — TELEPHONE ENCOUNTER
Johnna left at  [facility] office stating it is ok to start antibiotics per their usual protocol. Patient finished the keflex and feels fine now. If he develops problems or symptoms, he will call the office back.     Thank you

## 2022-04-01 NOTE — TELEPHONE ENCOUNTER
Is he still having sx of UTI  He was given Keflex was started on 3/19/22 for 7 DOT    Shanta Najera for starting antibiotics per usual procedural schedule

## 2022-04-08 ENCOUNTER — NURSE ONLY (OUTPATIENT)
Dept: UROLOGY | Age: 87
End: 2022-04-08
Payer: MEDICARE

## 2022-04-08 DIAGNOSIS — R33.9 URINARY RETENTION: ICD-10-CM

## 2022-04-08 PROCEDURE — 51702 INSERT TEMP BLADDER CATH: CPT | Performed by: UROLOGY

## 2022-04-10 LAB
ORGANISM: ABNORMAL
URINE CULTURE, ROUTINE: ABNORMAL
URINE CULTURE, ROUTINE: ABNORMAL

## 2022-04-11 RX ORDER — TAMSULOSIN HYDROCHLORIDE 0.4 MG/1
0.4 CAPSULE ORAL 2 TIMES DAILY
Qty: 60 CAPSULE | Refills: 1 | Status: SHIPPED | OUTPATIENT
Start: 2022-04-11 | End: 2022-06-10 | Stop reason: SDUPTHER

## 2022-04-15 ENCOUNTER — HOSPITAL ENCOUNTER (EMERGENCY)
Age: 87
Discharge: HOME | End: 2022-04-15
Payer: MEDICARE

## 2022-04-15 VITALS
TEMPERATURE: 97.6 F | SYSTOLIC BLOOD PRESSURE: 154 MMHG | OXYGEN SATURATION: 100 % | HEART RATE: 99 BPM | RESPIRATION RATE: 20 BRPM | DIASTOLIC BLOOD PRESSURE: 75 MMHG

## 2022-04-15 DIAGNOSIS — N39.0 URINARY TRACT INFECTION ASSOCIATED WITH INDWELLING URETHRAL CATHETER, INITIAL ENCOUNTER (HCC): Primary | ICD-10-CM

## 2022-04-15 DIAGNOSIS — T83.511A URINARY TRACT INFECTION ASSOCIATED WITH INDWELLING URETHRAL CATHETER, INITIAL ENCOUNTER (HCC): Primary | ICD-10-CM

## 2022-04-15 LAB
BACTERIA: ABNORMAL
BILIRUBIN URINE: NEGATIVE
BLOOD, URINE: ABNORMAL
CASTS: ABNORMAL /LPF
CASTS: ABNORMAL /LPF
CHARACTER, URINE: ABNORMAL
COLOR: YELLOW
CRYSTALS: ABNORMAL
EPITHELIAL CELLS, UA: ABNORMAL /HPF
GLUCOSE, URINE: NEGATIVE MG/DL
KETONES, URINE: NEGATIVE
LEUKOCYTE ESTERASE, URINE: ABNORMAL
MISCELLANEOUS LAB TEST RESULT: ABNORMAL
NITRITE, URINE: NEGATIVE
PH UA: 7 (ref 5–9)
PROTEIN UA: NEGATIVE MG/DL
RBC URINE: ABNORMAL /HPF
RENAL EPITHELIAL, UA: ABNORMAL
SPECIFIC GRAVITY UA: 1.01 (ref 1–1.03)
UROBILINOGEN, URINE: 0.2 EU/DL (ref 0–1)
WBC UA: > 200 /HPF
YEAST: ABNORMAL

## 2022-04-15 PROCEDURE — 81001 URINALYSIS AUTO W/SCOPE: CPT

## 2022-04-15 PROCEDURE — 87077 CULTURE AEROBIC IDENTIFY: CPT

## 2022-04-15 PROCEDURE — 51702 INSERT TEMP BLADDER CATH: CPT

## 2022-04-15 PROCEDURE — 87086 URINE CULTURE/COLONY COUNT: CPT

## 2022-04-15 PROCEDURE — 87186 SC STD MICRODIL/AGAR DIL: CPT

## 2022-04-15 PROCEDURE — 99282 EMERGENCY DEPT VISIT SF MDM: CPT

## 2022-04-15 RX ORDER — LEVOFLOXACIN 500 MG/1
500 TABLET, FILM COATED ORAL DAILY
Qty: 7 TABLET | Refills: 0 | Status: SHIPPED | OUTPATIENT
Start: 2022-04-15 | End: 2022-04-22

## 2022-04-15 ASSESSMENT — ENCOUNTER SYMPTOMS
VOMITING: 0
NAUSEA: 0
ABDOMINAL PAIN: 0

## 2022-04-15 ASSESSMENT — PAIN SCALES - GENERAL: PAINLEVEL_OUTOF10: 3

## 2022-04-15 ASSESSMENT — PAIN - FUNCTIONAL ASSESSMENT: PAIN_FUNCTIONAL_ASSESSMENT: 0-10

## 2022-04-15 ASSESSMENT — PAIN DESCRIPTION - PAIN TYPE: TYPE: ACUTE PAIN

## 2022-04-15 ASSESSMENT — PAIN DESCRIPTION - LOCATION: LOCATION: ABDOMEN

## 2022-04-15 ASSESSMENT — PAIN DESCRIPTION - DESCRIPTORS: DESCRIPTORS: TIGHTNESS

## 2022-04-15 NOTE — ED PROVIDER NOTES
[facility] Emergency Department    CHIEF COMPLAINT       Chief Complaint   Patient presents with    Urinary Catheter Problem       Nurses Notes reviewed and I agree except as noted in the HPI. HISTORY OF PRESENT ILLNESS    Gay Enrique is a 80 y.o. male who presents to the ED for evaluation of urinary catheter problem. Patient notes he has had several episodes of obstructed catheter in the past, most recently had his catheter replaced about a week ago in the urologist office. Notes he has a history of urinary retention associated with history of prostate cancer and scarring. He notes he frequently gets UTIs of the catheter. He denies any recent fevers chills, denies increase in bladder or urinary pain. He denies any other significant medical history in the past.        HPI was provided by the patient. REVIEW OF SYSTEMS     Review of Systems   Constitutional: Negative for activity change, chills and fever. Gastrointestinal: Negative for abdominal pain, nausea and vomiting. Genitourinary: Garduno catheter in place   Psychiatric/Behavioral: Negative for agitation and confusion. PAST MEDICAL HISTORY     Past Medical History:   Diagnosis Date    Hyperlipidemia     Hypertension     Prostate cancer ([facility] [address]        SURGICALHISTORY      has a past surgical history that includes hernia repair; Hemorrhoid surgery (1970); Prostate surgery (2005); Tonsillectomy; Colonoscopy; pr colonoscopy flx dx w/collj spec when pfrmd (Left, 6/11/2018); and Cystoscopy (Left, 10/14/2021).     CURRENT MEDICATIONS       Discharge Medication List as of 4/15/2022  9:25 AM      CONTINUE these medications which have NOT CHANGED    Details   tamsulosin (FLOMAX) 0.4 MG capsule Take 1 capsule by mouth 2 times daily, Disp-60 capsule, R-1Normal      Melatonin 10 MG TABS Take by mouth at bedtimeHistorical Med      amLODIPine (NORVASC) 10 MG tablet TAKE 1 TABLET EVERY DAY, Disp-90 tablet, R-3Normal      lisinopril (PRINIVIL;ZESTRIL) 10 MG tablet TAKE 1 TABLET TWICE DAILY, Disp-180 tablet, R-3Normal      mirtazapine (REMERON) 30 MG tablet Take 1 tablet by mouth nightly, Disp-30 tablet, R-5Normal      Acetaminophen (TYLENOL EXTRA STRENGTH PO) Take by mouth prnHistorical Med      Calcium Carb-Cholecalciferol (CALCIUM 1000 + D PO) Take 1 tablet by mouth dailyHistorical Med      Apoaequorin (PREVAGEN) 10 MG CAPS Take 10 mg by mouth dailyHistorical Med      UNABLE TO FIND Kefir 8 0z dailyHistorical Med      MULTIPLE VITAMIN PO Take by mouth dailyHistorical Med             ALLERGIES     has No Known Allergies. FAMILY HISTORY     He indicated that his mother is . He indicated that his father is . family history includes Diabetes in his father. SOCIAL HISTORY       Social History     Socioeconomic History    Marital status:      Spouse name: Not on file    Number of children: Not on file    Years of education: Not on file    Highest education level: Not on file   Occupational History    Not on file   Tobacco Use    Smoking status: Former Smoker     Quit date:      Years since quittin.4    Smokeless tobacco: Never Used   Vaping Use    Vaping Use: Never used   Substance and Sexual Activity    Alcohol use: No    Drug use: No    Sexual activity: Not on file   Other Topics Concern    Not on file   Social History Narrative    Not on file     Social Determinants of Health     Financial Resource Strain: Low Risk     Difficulty of Paying Living Expenses: Not hard at all   Food Insecurity: No Food Insecurity    Worried About [address] in the Last Year: Never true    [address] in the Last Year: Never true   Transportation Needs:     Lack of Transportation (Medical): Not on file    Lack of Transportation (Non-Medical):  Not on file   Physical Activity:     Days of Exercise per Week: Not on file    Minutes of Exercise per Session: Not on file   Stress:     Feeling of Stress : Not on file   Social Connections:     Frequency of Communication with Friends and Family: Not on file    Frequency of Social Gatherings with Friends and Family: Not on file    Attends Temple Services: Not on file    Active Member of Clubs or Organizations: Not on file    Attends Club or Organization Meetings: Not on file    Marital Status: Not on file   Intimate Partner Violence:     Fear of Current or Ex-Partner: Not on file    Emotionally Abused: Not on file    Physically Abused: Not on file    Sexually Abused: Not on file   Housing Stability:     Unable to Pay for Housing in the Last Year: Not on file    Number of [place] in the Last Year: Not on file    Unstable Housing in the Last Year: Not on file       PHYSICAL EXAM     INITIAL VITALS:  oral temperature is 97.6 °F (36.4 °C). His blood pressure is 154/75 (abnormal) and his pulse is 99. His respiration is 20 and oxygen saturation is 100%. Physical Exam  Vitals and nursing note reviewed. Constitutional:       Appearance: Normal appearance. He is normal weight. HENT:      Head: Normocephalic. Nose: Nose normal.      Mouth/Throat:      Mouth: Mucous membranes are moist.   Eyes:      Extraocular Movements: Extraocular movements intact. Cardiovascular:      Rate and Rhythm: Normal rate. Pulses: Normal pulses. Pulmonary:      Effort: Pulmonary effort is normal.   Abdominal:      General: Abdomen is flat. There is no distension. Palpations: Abdomen is soft. Tenderness: There is no abdominal tenderness. There is no right CVA tenderness or left CVA tenderness. Genitourinary:     Comments: Garduno catheter in place  Musculoskeletal:      Cervical back: Normal range of motion. Skin:     Capillary Refill: Capillary refill takes less than 2 seconds. Neurological:      General: No focal deficit present. Mental Status: He is alert.    Psychiatric:         Mood and Affect: Mood normal.         Behavior: Behavior normal.         DIFFERENTIAL DIAGNOSIS:   UTI, mucous plug, kinked catheter    DIAGNOSTIC RESULTS     RADIOLOGY: non-plainfilm images(s) such as CT, Ultrasound and MRI are read by the radiologist.  Plain radiographic images are visualized and preliminarily interpreted by the emergency physician unless otherwise stated below. No orders to display         LABS:   Labs Reviewed   URINALYSIS WITH MICROSCOPIC - Abnormal; Notable for the following components:       Result Value    Blood, Urine SMALL (*)     Leukocyte Esterase, Urine LARGE (*)     Character, Urine CLOUDY (*)     All other components within normal limits   CULTURE, URINE       EMERGENCY DEPARTMENT COURSE:   Vitals:    Vitals:    04/15/22 0817 04/15/22 0928   BP:  (!) 154/75   Pulse: 99    Resp: 20    Temp: 97.6 °F (36.4 °C)    TempSrc: Oral    SpO2: 100%        MDM  Patient was seen and evaluated in the emergency department, patient appears to be in no acute distress, vital signs as significant findings noted. Physical exam completed, no significant abnormality noted. Catheter was exchanged by nurse, noted cloudy urine, urine obtained, significant pyuria noted, will treat for UTI. Discussed findings with patient his minimal plan of care. Advise follow-up with urology for further evaluation. He verbalized understanding. Medications - No data to display    Patient was seenindependently by myself. The patient's final impression and disposition and plan was determined by myself. CRITICAL CARE:   None    CONSULTS:  None    PROCEDURES:  None    FINAL IMPRESSION     1.  Urinary tract infection associated with indwelling urethral catheter, initial encounter [facility])          DISPOSITION/PLAN   Patient discharged    PATIENT REFERREDTO:  [address] [facility]  [facility] [address]  Call   For follow up and evaluation      DISCHARGE MEDICATIONS:  Discharge Medication List as of 4/15/2022  9:25 AM      START taking these medications Details   levoFLOXacin (LEVAQUIN) 500 MG tablet Take 1 tablet by mouth daily for 7 days, Disp-7 tablet, R-0Normal             (Please note that portions of this note were completed with a voice recognition program.  Efforts were made to edit the dictations but occasionally words are mis-transcribed.)      Provider:  I personally performed the services described in the documentation,reviewed and edited the documentation which was dictated to the scribe in my presence, and it accurately records my words and actions.     Fredo Devries CNP 04/15/22 4:19 PM    Yoana Devries, APRN - CNP        [place], MARIKA - CNP  04/15/22 1392

## 2022-04-15 NOTE — ED NOTES
Patient to ED for an obstructed ortega catheter. Patient reports it was last changed a week ago. Patient states when it gets obsctructed he usually has a UTI. Patient denies fever or chills at this time  Ortega catheter replaced with 16fr coude. Patient tolerated well.  Urine yellow and cloud in character      Theodora Erwin RN  04/15/22 0663

## 2022-04-17 LAB
ORGANISM: ABNORMAL
URINE CULTURE, ROUTINE: ABNORMAL

## 2022-04-19 NOTE — PROGRESS NOTES
Pharmacy Note  ED Culture Follow-up    Alexis Whiting is a 80 y.o. male. Allergies: Patient has no known allergies. Labs:  Lab Results   Component Value Date    BUN 17 03/19/2022    CREATININE 0.8 03/19/2022    WBC 8.8 03/19/2022     CrCl cannot be calculated (Patient's most recent lab result is older than the maximum 10 days allowed. ). Current antimicrobials:   Levaquin 500 mg daily x 7 days (I)    ASSESSMENT:  Micro results:   Urine culture: positive for Proteus mirabilis (<10,000)     PLAN:  Need for intervention: No 2/2 CFU <10,000 and no urinary symptoms  Discussed with: Isidro Camilo PA-C  Chosen treatment:    Patient already on appropriate treatment as above    Patient response:   No need to contact patient    Called/sent in prescription to: Not applicable    Please call with any questions.  [address] [facility], PharmD 3:16 PM 4/19/2022

## 2022-05-04 ENCOUNTER — TELEPHONE (OUTPATIENT)
Dept: UROLOGY | Age: 87
End: 2022-05-04

## 2022-05-04 NOTE — TELEPHONE ENCOUNTER
Patient stated for the last week he has had pain when using the leg bag in the lower groin area that comes and goes. The catheter is draining after he stretches his leg and the pain resolves. He does not have problems when he has the night bag on. Patient advised to check the catheter to make sure there are no kinks in it. Patient advised to adjust the bag while wearing the leg bag so the catheter does not get kinked. Patient also advised may leave the overnight bag on during the day. He will call back for any other questions or problems.

## 2022-05-10 ENCOUNTER — OFFICE VISIT (OUTPATIENT)
Dept: UROLOGY | Age: 87
End: 2022-05-10
Payer: MEDICARE

## 2022-05-10 VITALS
WEIGHT: 174 LBS | HEIGHT: 72 IN | BODY MASS INDEX: 23.57 KG/M2 | SYSTOLIC BLOOD PRESSURE: 136 MMHG | DIASTOLIC BLOOD PRESSURE: 74 MMHG

## 2022-05-10 DIAGNOSIS — N39.0 RECURRENT UTI: ICD-10-CM

## 2022-05-10 DIAGNOSIS — N35.916 STRICTURE OF OVERLAPPING SITES OF URETHRA IN MALE, UNSPECIFIED STRICTURE TYPE: Primary | ICD-10-CM

## 2022-05-10 DIAGNOSIS — Z85.46 HISTORY OF PROSTATE CANCER: ICD-10-CM

## 2022-05-10 PROCEDURE — 1123F ACP DISCUSS/DSCN MKR DOCD: CPT | Performed by: UROLOGY

## 2022-05-10 PROCEDURE — 99214 OFFICE O/P EST MOD 30 MIN: CPT | Performed by: UROLOGY

## 2022-05-10 PROCEDURE — 1036F TOBACCO NON-USER: CPT | Performed by: UROLOGY

## 2022-05-10 PROCEDURE — 4040F PNEUMOC VAC/ADMIN/RCVD: CPT | Performed by: UROLOGY

## 2022-05-10 PROCEDURE — G8427 DOCREV CUR MEDS BY ELIG CLIN: HCPCS | Performed by: UROLOGY

## 2022-05-10 PROCEDURE — 51702 INSERT TEMP BLADDER CATH: CPT | Performed by: UROLOGY

## 2022-05-10 PROCEDURE — G8420 CALC BMI NORM PARAMETERS: HCPCS | Performed by: UROLOGY

## 2022-05-10 NOTE — PROGRESS NOTES
[facility] [address] [id number]  Dept: [phone]  Dept Fax: [phone]: [address] [address] Urology Office Note -     Patient:  Ethan Alves  YOB: 1935  Date: 5/10/2022    The patient is a 80 y.o. male who presents today for evaluation of the following problems:   Chief Complaint   Patient presents with    Follow-up     ortega change and OV with Dr Des Caceres today     referred/consultation requested by Remi Weeks MD.    HISTORY OF PRESENT ILLNESS:       Bulbar urethral stricture  Failed voiding trials   Has indwelling catheter  Stricture has been dilated in the past  Hx of brachytherapy    Gross hematuria  Several episodes-- blood clots  Scheduled for cystoscopy and CT urogram    Prostate cancer  Hx of brachytherapy  Needs a PSA      Requested/reviewed records from Remi Weeks MD office and/or outside [de-identified]    (Patient's old records have been requested, reviewed and pertinent findings summarized in today's note.)    Procedures Today: N/A      Last several PSA's:  Lab Results   Component Value Date    PSA <0.02 05/08/2015       Last total testosterone:  No results found for: TESTOSTERONE    Urinalysis today:  No results found for this visit on 05/10/22. Last BUN and creatinine:  Lab Results   Component Value Date    BUN 17 03/19/2022     Lab Results   Component Value Date    CREATININE 0.8 03/19/2022         Imaging Reviewed during this Office Visit:   Julieta Ramirez MD independently reviewed the images and verified the radiology reports from:    DEXA BONE DENSITY AXIAL SKELETON    Result Date: 3/12/2021  EXAM: DEXA BONE DENSITY AXIAL SKELETON HISTORY: 80 years, Male, Localized osteoporosis without current pathological fracture COMPARISON: 12/26/2018. FINDINGS: Bone densitometry has been performed using a Hologic bone densitometer.  The routine evaluation includes the lumbar spine and both hips. Evaluation of the L1, L3, L4 of the lumbar spine demonstrates an average bone mineral density measurement of 0.984g/cm2 which corresponds to a T-score of -1.0 and a Z-score of 0.3. Previously average bone mineral density measurement of 1.022 g/cm2 which corresponds to a T-score of -0.6 and a Z-score of 0.6. This qualifies as normal bone mineral density. The calculated bone density in the left femoral neck is 0.629 g/cm2 which corresponds to a T-score of  -2.2 and a Z-score of -0.5. Previously average bone mineral density measurement of 0.611 g/cm2 which corresponds to a T-score of -2.3 and a Z-score of -0.7. This qualifies as osteopenia. The calculated bone density in the right femoral neck is 0.689 g/cm2 which corresponds to a T-score of  minus 1. and a Z-score of -0.1. Previously average bone mineral density measurement of 0.659 g/cm2 which corresponds to a T-score of -2.0 and a Z-score of -0.3. This qualifies as osteopenia. BMD changes versus previous is -3.7% in the spine. BMD change versus previous is 3.9% for the hips using the mean. This patient is osteopenic using the World Health Organization criteria. The patient is at a medium risk for fracture. 10 year probability of major osteoporotic fracture: 21% 10 year probability of hip fracture: 16% The patient's meets criteria for referral to a bone fragility clinic. **This report has been created using voice recognition software. It may contain minor errors which are inherent in voice recognition technology. ** Final report electronically signed by Dr. Cindy Phillip on 3/12/2021 1:32 PM      PAST MEDICAL, FAMILY AND SOCIAL HISTORY:  Past Medical History:   Diagnosis Date    Hyperlipidemia     Hypertension     Prostate cancer [facility])      Past Surgical History:   Procedure Laterality Date    COLONOSCOPY      CYSTOSCOPY Left 10/14/2021    CYSTOSCOPY EVACUATION OF CLOTS, EVACUATION OF BLADDER STONE, CHANNEL TURP  performed by Teja Rosenthal MD at [address] [place] [address]      SC COLONOSCOPY FLX DX W/COLLJ [facility] WHEN PFRMD Left 2018    COLONOSCOPY performed by Leslee Rankin MD at [place] [address]      seed implant    TONSILLECTOMY       Family History   Problem Relation Age of Onset    Diabetes Father      No outpatient medications have been marked as taking for the 5/10/22 encounter (Office Visit) with Gladys Albert MD.       Patient has no known allergies. Social History     Tobacco Use   Smoking Status Former Smoker    Quit date: 1968    Years since quittin.3   Smokeless Tobacco Never Used      (If patient a smoker, smoking cessation counseling offered)   Social History     Substance and Sexual Activity   Alcohol Use No       REVIEW OF SYSTEMS:  Constitutional: negative  Eyes: negative  Respiratory: negative  Cardiovascular: negative  Gastrointestinal: negative  Genitourinary: see HPI  Musculoskeletal: negative  Skin: negative   Neurological: negative  Hematological/Lymphatic: negative  Psychological: negative        Physical Exam:    This a 80 y.o. male  Vitals:    05/10/22 1127   BP: 136/74     Body mass index is 23.6 kg/m². Constitutional: Patient in no acute distress;         Assessment and Plan        1. Stricture of overlapping sites of urethra in male, unspecified stricture type    2. Recurrent UTI    3. History of prostate cancer               Plan:         Bulbar stricture- secondary to radiation. Has been dilated in past. Gets monthly catheter changes  Gross hematuria- secondary to radiation. No recent hematuria  Recurrent uti- treated last month. Overall stable  Hx of prostate cancer- s/p brachytherapy. Needs PSA       Catheter change monthly  Did offer gen surg referral for poss new hernia on right side--not bothering pt so he will hold off  F/u in six months.            Prescriptions Ordered:  No orders of the defined types were placed in this encounter. Orders Placed:  No orders of the defined types were placed in this encounter.            Candice Ramachandran MD

## 2022-05-10 NOTE — PROGRESS NOTES
Patient has given me verbal consent to perform catheter change Yes    Does patient have latex allergy? No  Does patient have shellfish or betadine allergy? No      Following Dr. Evaristo Shaffer of [facility]. 16 Fr coude Catheter changed without difficulty. Once balloon was deflated, removed catheter without difficulty. Cleansed urethral opening with betadine swab. 16 Fr Coude ortega was inserted using sterile water-soluble lubricant without difficulty. Catheter was flushed with 50cc water ensuring return and inflated balloon with 10 ml of water. Ortega Catheter was hooked up to leg bag with straps. Foreskin reduced back down? N/A    Patient instructed on how to drain catheter bags. If patient was given a leg bag, patient was instructed to keep bag above the knee to prevent pulling on catheter. Pt notified if catheter is pulled on may cause pain and bleeding. Patient was also instructed on how to switch from leg bag to overnight bag. Supplies were provided by office    Bard order given?  No

## 2022-05-11 ENCOUNTER — TELEPHONE (OUTPATIENT)
Dept: INTERNAL MEDICINE CLINIC | Age: 87
End: 2022-05-11

## 2022-05-11 NOTE — TELEPHONE ENCOUNTER
Pt called in saying he has been having some rt sided groin pain. Since about January. At times it gets pretty intense. Can have it 2-3 times per day or sometimes just once a day. If he slouches way down and put his hand on the area he can get relief. He has a catheter and wear a bag during the day where it is strapped to his thigh and at night uses  A bad strapped to the bed. He states he never has this with laying down but it comes on with sitting or walking. He had an appt with Dr. Courtney Sarkar yesterday and he mention it do him and he looked at area but never really examined him and he advised him he had a hernia and offered referral to surgeon  Pt wanted to see Dr Liudmila Bundy for exam.  I gave him an appt for 5/16/22 and advised pt if pain becomes severe in the meantime he should go to ER. He verbalizes understanding.

## 2022-05-16 ENCOUNTER — OFFICE VISIT (OUTPATIENT)
Dept: INTERNAL MEDICINE CLINIC | Age: 87
End: 2022-05-16
Payer: MEDICARE

## 2022-05-16 VITALS
SYSTOLIC BLOOD PRESSURE: 170 MMHG | TEMPERATURE: 98.2 F | HEIGHT: 72 IN | WEIGHT: 170.8 LBS | DIASTOLIC BLOOD PRESSURE: 66 MMHG | HEART RATE: 76 BPM | BODY MASS INDEX: 23.13 KG/M2

## 2022-05-16 DIAGNOSIS — K40.90 INGUINAL HERNIA, RIGHT: Primary | ICD-10-CM

## 2022-05-16 PROCEDURE — 99212 OFFICE O/P EST SF 10 MIN: CPT | Performed by: INTERNAL MEDICINE

## 2022-05-16 PROCEDURE — 1036F TOBACCO NON-USER: CPT | Performed by: INTERNAL MEDICINE

## 2022-05-16 PROCEDURE — G8420 CALC BMI NORM PARAMETERS: HCPCS | Performed by: INTERNAL MEDICINE

## 2022-05-16 PROCEDURE — 1123F ACP DISCUSS/DSCN MKR DOCD: CPT | Performed by: INTERNAL MEDICINE

## 2022-05-16 PROCEDURE — G8427 DOCREV CUR MEDS BY ELIG CLIN: HCPCS | Performed by: INTERNAL MEDICINE

## 2022-05-16 PROCEDURE — 4040F PNEUMOC VAC/ADMIN/RCVD: CPT | Performed by: INTERNAL MEDICINE

## 2022-05-16 RX ORDER — LEG BRACE
EACH MISCELLANEOUS
Qty: 1 EACH | Refills: 1 | Status: SHIPPED | OUTPATIENT
Start: 2022-05-16

## 2022-05-16 RX ORDER — MIRTAZAPINE 30 MG/1
30 TABLET, FILM COATED ORAL NIGHTLY
Qty: 30 TABLET | Refills: 5 | Status: SHIPPED | OUTPATIENT
Start: 2022-05-16

## 2022-05-16 ASSESSMENT — PATIENT HEALTH QUESTIONNAIRE - PHQ9
2. FEELING DOWN, DEPRESSED OR HOPELESS: 0
1. LITTLE INTEREST OR PLEASURE IN DOING THINGS: 0
SUM OF ALL RESPONSES TO PHQ QUESTIONS 1-9: 0
SUM OF ALL RESPONSES TO PHQ9 QUESTIONS 1 & 2: 0

## 2022-05-16 NOTE — PROGRESS NOTES
Alexis Whiting (:  1935) is a 80 y.o. male,Established patient, here for evaluation of the following chief complaint(s):  Groin Pain (rt x several months--comes and goes-never bothers him at night-usually when sitting up-periodically may get sharp)         ASSESSMENT/PLAN:  1. Inguinal hernia, right - notes right inguinal bulge; try truss; refer surgeon if worsens; advised seek med attention if persists or bulge nonreducible      Return in about 6 months (around 2022). Subjective   SUBJECTIVE/OBJECTIVE:  HPI pt with hbp, chronic ortega seen for right inguinal bulg and pain off and on; lasts few minutes. Not present when lies down. Wife concerned re effect of anesthesia on his dementia. Review of Systems   hbp, chronic ortega, dementia    Objective   Physical Exam    BP (!) 170/66 (Site: Left Upper Arm)   Pulse 76   Temp 98.2 °F (36.8 °C)   Ht 6' 0.01\" (1.829 m)   Wt 170 lb 12.8 oz (77.5 kg)   BMI 23.16 kg/m²     Exam limited to right inguinal area; noticeable round bulge, 2cm,  smooth, easily reducible. An electronic signature was used to authenticate this note.     --Cisco Solomon MD

## 2022-05-16 NOTE — TELEPHONE ENCOUNTER
[facility] asking for a script. Otherwise pt will have to pay out of pocket $148.00.    Script will be faxed to [address]

## 2022-05-27 ENCOUNTER — NURSE ONLY (OUTPATIENT)
Dept: UROLOGY | Age: 87
End: 2022-05-27
Payer: MEDICARE

## 2022-05-27 DIAGNOSIS — R33.8 ACUTE URINARY RETENTION: ICD-10-CM

## 2022-05-27 PROCEDURE — 51702 INSERT TEMP BLADDER CATH: CPT | Performed by: NURSE PRACTITIONER

## 2022-05-27 NOTE — PROGRESS NOTES
Patient has given me verbal consent to perform catheter change Yes    Does patient have latex allergy? No  Does patient have shellfish or betadine allergy? No      Following Moody Wilkerson CNP plan of care. 16 Fr Catheter changed without difficulty. Once balloon was deflated, removed catheter without difficulty. Cleansed urethral opening with betadine swab. 16 Fr regular ortega was inserted using sterile water-soluble lubricant without difficulty. Got return of 300 and inflated balloon with 10 ml of water. Ortega Catheter was hooked up to leg bag with straps. Foreskin reduced back down? No    Patient instructed on how to drain catheter bags. If patient was given a leg bag, patient was instructed to keep bag above the knee to prevent pulling on catheter. Pt notified if catheter is pulled on may cause pain and bleeding. Patient was also instructed on how to switch from leg bag to overnight bag. Supplies were provided by office    Bard order given? No- pt wife did not want catheters sent to their house.

## 2022-06-07 ENCOUNTER — NURSE ONLY (OUTPATIENT)
Dept: UROLOGY | Age: 87
End: 2022-06-07
Payer: COMMERCIAL

## 2022-06-07 ENCOUNTER — TELEPHONE (OUTPATIENT)
Dept: UROLOGY | Age: 87
End: 2022-06-07

## 2022-06-07 DIAGNOSIS — R33.9 URINARY RETENTION: ICD-10-CM

## 2022-06-07 PROCEDURE — 51702 INSERT TEMP BLADDER CATH: CPT | Performed by: NURSE PRACTITIONER

## 2022-06-07 NOTE — PROGRESS NOTES
Patient has given me verbal consent to perform catheter change Yes    Does patient have latex allergy? No  Does patient have shellfish or betadine allergy? No      Following [facility] plan of care. 16 Fr Catheter changed without difficulty. Once balloon was deflated, removed catheter without difficulty. Cleansed urethral opening with betadine swab. 16 Fr regular ortega was inserted using sterile water-soluble lubricant without difficulty. Got Return of 450cc and inflated balloon with 10 ml of water. Ortega Catheter was hooked up to leg bag with straps. Foreskin reduced back down? No    Patient instructed on how to drain catheter bags. If patient was given a leg bag, patient was instructed to keep bag above the knee to prevent pulling on catheter. Pt notified if catheter is pulled on may cause pain and bleeding. Patient was also instructed on how to switch from leg bag to overnight bag. Supplies were provided by office    Bard order given?  No

## 2022-06-07 NOTE — TELEPHONE ENCOUNTER
Patient states that he has a urinary catheter and thinks it may be clogged. I was unable to reach clinical staff at this time, please f/u with patient. He stated that he isn't sure what to do, but has to do something. He said he's not sure if he just needs to go to the ED and said he is going to stop by the office.    [phone]

## 2022-06-07 NOTE — PROGRESS NOTES
Patient came in today because his catheter is not draining. He emptied his leg bag last night and woke up this morning and there was little urine in his leg bag. Patient had an appt on 06/17/22 for his next catheter change, appt cancelled and new catheter inserted today.

## 2022-06-10 RX ORDER — TAMSULOSIN HYDROCHLORIDE 0.4 MG/1
0.4 CAPSULE ORAL 2 TIMES DAILY
Qty: 60 CAPSULE | Refills: 1 | Status: SHIPPED | OUTPATIENT
Start: 2022-06-10 | End: 2022-08-12 | Stop reason: SDUPTHER

## 2022-06-10 NOTE — TELEPHONE ENCOUNTER
Parisa West called requesting a refill on the following medications:  Requested Prescriptions     Pending Prescriptions Disp Refills    tamsulosin (FLOMAX) 0.4 mg capsule 60 capsule 1     Sig: Take 1 capsule by mouth 2 times daily     Pharmacy verified:  .[email or web address]      Date of last visit:   Date of next visit (if applicable): 3/47/4540

## 2022-06-16 ENCOUNTER — HOSPITAL ENCOUNTER (EMERGENCY)
Age: 87
Discharge: HOME | End: 2022-06-16
Attending: EMERGENCY MEDICINE
Payer: COMMERCIAL

## 2022-06-16 VITALS
RESPIRATION RATE: 20 BRPM | WEIGHT: 175 LBS | OXYGEN SATURATION: 97 % | HEIGHT: 72 IN | BODY MASS INDEX: 23.7 KG/M2 | TEMPERATURE: 97.9 F | HEART RATE: 74 BPM | DIASTOLIC BLOOD PRESSURE: 80 MMHG | SYSTOLIC BLOOD PRESSURE: 169 MMHG

## 2022-06-16 DIAGNOSIS — I10 ESSENTIAL HYPERTENSION: ICD-10-CM

## 2022-06-16 DIAGNOSIS — N39.0 URINARY TRACT INFECTION ASSOCIATED WITH INDWELLING URETHRAL CATHETER, INITIAL ENCOUNTER (HCC): ICD-10-CM

## 2022-06-16 DIAGNOSIS — T83.511A URINARY TRACT INFECTION ASSOCIATED WITH INDWELLING URETHRAL CATHETER, INITIAL ENCOUNTER (HCC): ICD-10-CM

## 2022-06-16 DIAGNOSIS — T83.011A MALFUNCTION OF FOLEY CATHETER, INITIAL ENCOUNTER (HCC): Primary | ICD-10-CM

## 2022-06-16 LAB
ANION GAP SERPL CALCULATED.3IONS-SCNC: 10 MEQ/L (ref 8–16)
BACTERIA: ABNORMAL /HPF
BASOPHILS # BLD: 0.4 %
BASOPHILS ABSOLUTE: 0 THOU/MM3 (ref 0–0.1)
BILIRUBIN URINE: NEGATIVE
BLOOD, URINE: ABNORMAL
BUN BLDV-MCNC: 15 MG/DL (ref 7–22)
CALCIUM SERPL-MCNC: 9.4 MG/DL (ref 8.5–10.5)
CASTS 2: ABNORMAL /LPF
CASTS UA: ABNORMAL /LPF
CHARACTER, URINE: CLEAR
CHLORIDE BLD-SCNC: 102 MEQ/L (ref 98–111)
CO2: 26 MEQ/L (ref 23–33)
COLOR: YELLOW
CREAT SERPL-MCNC: 0.9 MG/DL (ref 0.4–1.2)
CRYSTALS, UA: ABNORMAL
EOSINOPHIL # BLD: 1.3 %
EOSINOPHILS ABSOLUTE: 0.1 THOU/MM3 (ref 0–0.4)
EPITHELIAL CELLS, UA: ABNORMAL /HPF
ERYTHROCYTE [DISTWIDTH] IN BLOOD BY AUTOMATED COUNT: 14.6 % (ref 11.5–14.5)
ERYTHROCYTE [DISTWIDTH] IN BLOOD BY AUTOMATED COUNT: 49.8 FL (ref 35–45)
GFR SERPL CREATININE-BSD FRML MDRD: 80 ML/MIN/1.73M2
GLUCOSE BLD-MCNC: 117 MG/DL (ref 70–108)
GLUCOSE URINE: NEGATIVE MG/DL
HCT VFR BLD CALC: 38.9 % (ref 42–52)
HEMOGLOBIN: 13 GM/DL (ref 14–18)
IMMATURE GRANS (ABS): 0.02 THOU/MM3 (ref 0–0.07)
IMMATURE GRANULOCYTES: 0.3 %
KETONES, URINE: NEGATIVE
LEUKOCYTE ESTERASE, URINE: ABNORMAL
LYMPHOCYTES # BLD: 12.7 %
LYMPHOCYTES ABSOLUTE: 0.9 THOU/MM3 (ref 1–4.8)
MAGNESIUM: 2 MG/DL (ref 1.6–2.4)
MCH RBC QN AUTO: 31.2 PG (ref 26–33)
MCHC RBC AUTO-ENTMCNC: 33.4 GM/DL (ref 32.2–35.5)
MCV RBC AUTO: 93.3 FL (ref 80–94)
MISCELLANEOUS 2: ABNORMAL
MONOCYTES # BLD: 11.9 %
MONOCYTES ABSOLUTE: 0.8 THOU/MM3 (ref 0.4–1.3)
NITRITE, URINE: NEGATIVE
NUCLEATED RED BLOOD CELLS: 0 /100 WBC
OSMOLALITY CALCULATION: 277.5 MOSMOL/KG (ref 275–300)
PH UA: 6.5 (ref 5–9)
PLATELET # BLD: 313 THOU/MM3 (ref 130–400)
PMV BLD AUTO: 9.4 FL (ref 9.4–12.4)
POTASSIUM REFLEX MAGNESIUM: 3.1 MEQ/L (ref 3.5–5.2)
PROTEIN UA: 100
RBC # BLD: 4.17 MILL/MM3 (ref 4.7–6.1)
RBC URINE: ABNORMAL /HPF
RENAL EPITHELIAL, UA: PRESENT
SEG NEUTROPHILS: 73.4 %
SEGMENTED NEUTROPHILS ABSOLUTE COUNT: 5 THOU/MM3 (ref 1.8–7.7)
SODIUM BLD-SCNC: 138 MEQ/L (ref 135–145)
SPECIFIC GRAVITY, URINE: 1.01 (ref 1–1.03)
UROBILINOGEN, URINE: 0.2 EU/DL (ref 0–1)
WBC # BLD: 6.8 THOU/MM3 (ref 4.8–10.8)
WBC UA: ABNORMAL /HPF
YEAST: ABNORMAL

## 2022-06-16 PROCEDURE — 36415 COLL VENOUS BLD VENIPUNCTURE: CPT

## 2022-06-16 PROCEDURE — 87086 URINE CULTURE/COLONY COUNT: CPT

## 2022-06-16 PROCEDURE — 81001 URINALYSIS AUTO W/SCOPE: CPT

## 2022-06-16 PROCEDURE — 99284 EMERGENCY DEPT VISIT MOD MDM: CPT

## 2022-06-16 PROCEDURE — 93005 ELECTROCARDIOGRAM TRACING: CPT | Performed by: EMERGENCY MEDICINE

## 2022-06-16 PROCEDURE — 96375 TX/PRO/DX INJ NEW DRUG ADDON: CPT

## 2022-06-16 PROCEDURE — 83735 ASSAY OF MAGNESIUM: CPT

## 2022-06-16 PROCEDURE — 2580000003 HC RX 258: Performed by: EMERGENCY MEDICINE

## 2022-06-16 PROCEDURE — 6360000002 HC RX W HCPCS: Performed by: EMERGENCY MEDICINE

## 2022-06-16 PROCEDURE — 85025 COMPLETE CBC W/AUTO DIFF WBC: CPT

## 2022-06-16 PROCEDURE — 96365 THER/PROPH/DIAG IV INF INIT: CPT

## 2022-06-16 PROCEDURE — 80048 BASIC METABOLIC PNL TOTAL CA: CPT

## 2022-06-16 PROCEDURE — 87077 CULTURE AEROBIC IDENTIFY: CPT

## 2022-06-16 PROCEDURE — 87186 SC STD MICRODIL/AGAR DIL: CPT

## 2022-06-16 RX ORDER — NITROFURANTOIN 25; 75 MG/1; MG/1
100 CAPSULE ORAL 2 TIMES DAILY
Qty: 10 CAPSULE | Refills: 0 | Status: SHIPPED | OUTPATIENT
Start: 2022-06-16 | End: 2022-06-21

## 2022-06-16 RX ORDER — HYDRALAZINE HYDROCHLORIDE 20 MG/ML
10 INJECTION INTRAMUSCULAR; INTRAVENOUS ONCE
Status: COMPLETED | OUTPATIENT
Start: 2022-06-16 | End: 2022-06-16

## 2022-06-16 RX ADMIN — HYDRALAZINE HYDROCHLORIDE 10 MG: 20 INJECTION, SOLUTION INTRAMUSCULAR; INTRAVENOUS at 22:07

## 2022-06-16 RX ADMIN — CEFTRIAXONE SODIUM 1000 MG: 1 INJECTION, POWDER, FOR SOLUTION INTRAMUSCULAR; INTRAVENOUS at 21:27

## 2022-06-16 ASSESSMENT — PAIN - FUNCTIONAL ASSESSMENT
PAIN_FUNCTIONAL_ASSESSMENT: 0-10

## 2022-06-16 ASSESSMENT — PAIN SCALES - GENERAL
PAINLEVEL_OUTOF10: 10
PAINLEVEL_OUTOF10: 2
PAINLEVEL_OUTOF10: 4
PAINLEVEL_OUTOF10: 2

## 2022-06-16 NOTE — ED NOTES
Current ortega removed by this RN. Pt states he feels relief of pressure and pain.       [facility]  06/16/22 1952

## 2022-06-16 NOTE — ED NOTES
New ortega inserted by this RN, urine return noted. Pt states it feels \"a lot better. \"      [place], [facility]  06/16/22 5685

## 2022-06-17 LAB
EKG ATRIAL RATE: 73 BPM
EKG P AXIS: 42 DEGREES
EKG P-R INTERVAL: 190 MS
EKG Q-T INTERVAL: 406 MS
EKG QRS DURATION: 100 MS
EKG QTC CALCULATION (BAZETT): 447 MS
EKG R AXIS: -37 DEGREES
EKG T AXIS: 53 DEGREES
EKG VENTRICULAR RATE: 73 BPM
ORGANISM: ABNORMAL
URINE CULTURE REFLEX: ABNORMAL

## 2022-06-17 PROCEDURE — 93010 ELECTROCARDIOGRAM REPORT: CPT | Performed by: NUCLEAR MEDICINE

## 2022-06-17 ASSESSMENT — ENCOUNTER SYMPTOMS
BACK PAIN: 0
SHORTNESS OF BREATH: 0
VOMITING: 0
COUGH: 0
ABDOMINAL PAIN: 1

## 2022-06-17 NOTE — ED NOTES
Pt medicated per JEREMIAH Garduno appears to be draining properly. Pt denies any pain.  [facility]  06/16/22 1022

## 2022-06-17 NOTE — ED NOTES
Pt resting in bed. Urine specimen obtained from ortega. No needs expressed at this time.  [facility]  06/16/22 2052

## 2022-06-17 NOTE — ED PROVIDER NOTES
[facility]    EMERGENCY MEDICINE     Pt Name: Alondra Flynn  MRN: [id number]  [place] 1935  Date of evaluation: 6/16/2022  Provider: Gianna Santos MD    CHIEF COMPLAINT       Chief Complaint   Patient presents with    Urinary Catheter Problem     Wont drain       HISTORY OF PRESENT ILLNESS    Alondra Flynn is a pleasant 80 y.o. male   Presents to the emergency department from home complaining of ortega catheter being clogged and wont drain. He c/o feeling urge to urinate and suprapubic pain. Ortega changed prior to my evaluation and he now reports symptoms resolved. No other complaints, no other known exacerbating/relieving factors. Triage notes and Nursing notes were reviewed by myself. Any discrepancies are addressed above.     PAST MEDICAL HISTORY     Past Medical History:   Diagnosis Date    Hyperlipidemia     Hypertension     Prostate cancer ([facility] [address]        SURGICAL HISTORY       Past Surgical History:   Procedure Laterality Date    COLONOSCOPY      CYSTOSCOPY Left 10/14/2021    CYSTOSCOPY EVACUATION OF CLOTS, EVACUATION OF BLADDER STONE, CHANNEL TURP  performed by Elinor Giles MD at [address] [place] [address]      LA COLONOSCOPY FLX DX W/PAOLO Edward 1978 PFRMD Left 6/11/2018    COLONOSCOPY performed by Tiffanie King MD at [place] [address]  2005    seed implant   [facility]       Discharge Medication List as of 6/16/2022 11:14 PM      CONTINUE these medications which have NOT CHANGED    Details   tamsulosin (FLOMAX) 0.4 mg capsule Take 1 capsule by mouth 2 times daily, Disp-60 capsule, R-1Normal      mirtazapine (REMERON) 30 MG tablet Take 1 tablet by mouth nightly, Disp-30 tablet, R-5Normal      Elastic Bandages & Supports (B & B HERNIA BELT) MISC Disp-1 each, R-1, PrintBrand per patient preference      Melatonin 10 MG TABS Take by mouth at bedtimeHistorical Med      amLODIPine (NORVASC) 10 MG tablet TAKE 1 TABLET EVERY DAY, Disp-90 tablet, R-3Normal      lisinopril (PRINIVIL;ZESTRIL) 10 MG tablet TAKE 1 TABLET TWICE DAILY, Disp-180 tablet, R-3Normal      Acetaminophen (TYLENOL EXTRA STRENGTH PO) Take by mouth prnHistorical Med      Calcium Carb-Cholecalciferol (CALCIUM 1000 + D PO) Take 1 tablet by mouth dailyHistorical Med      Apoaequorin (PREVAGEN) 10 MG CAPS Take 10 mg by mouth dailyHistorical Med      MULTIPLE VITAMIN PO Take by mouth dailyHistorical Med             ALLERGIES     Patient has no known allergies. FAMILY HISTORY       Family History   Problem Relation Age of Onset    Diabetes Father         SOCIAL HISTORY       Social History     Socioeconomic History    Marital status:      Spouse name: None    Number of children: None    Years of education: None    Highest education level: None   Occupational History    None   Tobacco Use    Smoking status: Former Smoker     Quit date:      Years since quittin.4    Smokeless tobacco: Never Used   Vaping Use    Vaping Use: Never used   Substance and Sexual Activity    Alcohol use: No    Drug use: No    Sexual activity: None   Other Topics Concern    None   Social History Narrative    None     Social Determinants of Health     Financial Resource Strain: Low Risk     Difficulty of Paying Living Expenses: Not hard at all   Food Insecurity: No Food Insecurity    Worried About Running Out of Food in the Last Year: Never true    Denise of Food in the Last Year: Never true   Transportation Needs:     Lack of Transportation (Medical): Not on file    Lack of Transportation (Non-Medical):  Not on file   Physical Activity:     Days of Exercise per Week: Not on file    Minutes of Exercise per Session: Not on file   Stress:     Feeling of Stress : Not on file   Social Connections:     Frequency of Communication with Friends and Family: Not on file    Frequency of Social Gatherings with Friends and Family: Not on file    Attends Jain Services: Not on file    Active Member of Clubs or Organizations: Not on file    Attends Club or Organization Meetings: Not on file    Marital Status: Not on file   Intimate Partner Violence:     Fear of Current or Ex-Partner: Not on file    Emotionally Abused: Not on file    Physically Abused: Not on file    Sexually Abused: Not on file   Housing Stability:     Unable to Pay for Housing in the Last Year: Not on file    Number of [place] in the Last Year: Not on file    Unstable Housing in the Last Year: Not on file       REVIEW OF SYSTEMS     Review of Systems   Constitutional: Negative for chills and fever. Respiratory: Negative for cough and shortness of breath. Cardiovascular: Negative for chest pain and leg swelling. Gastrointestinal: Positive for abdominal pain. Negative for vomiting. Musculoskeletal: Negative for back pain and neck pain. Skin: Negative for rash and wound. Neurological: Negative for weakness and numbness. All other systems reviewed and are negative. Except as noted above the remainder of the review of systems was reviewed and is. PHYSICAL EXAM    (up to 7 for level 4, 8 or more for level 5)     ED Triage Vitals [06/16/22 1942]   BP Temp Temp Source Heart Rate Resp SpO2 Height Weight   (!) 192/92 97.9 °F (36.6 °C) Oral (!) 108 18 96 % 6' (1.829 m) 175 lb (79.4 kg)       Physical Exam  Vitals and nursing note reviewed. Constitutional:       General: He is awake. HENT:      Head: Normocephalic and atraumatic. Mouth/Throat:      Mouth: Mucous membranes are moist.   Eyes:      General: Lids are normal.      Conjunctiva/sclera: Conjunctivae normal.   Neck:      Vascular: No JVD. Trachea: No tracheal deviation. Cardiovascular:      Rate and Rhythm: Normal rate and regular rhythm. Pulses: Normal pulses. Heart sounds: No murmur heard. No friction rub. No gallop.     Pulmonary:      Effort: Pulmonary effort All other labs were within normal range or not returned as of this dictation. Please note, any cultures that may have been sent were not resulted at the time of this patient visit. EMERGENCY DEPARTMENT COURSE andMedical Decision Making:     MDM/   Pt presents to the ER with symptoms of clogged ortega catheter, ortega changed and symptoms resolved, UA concerning for UTI, will treat. Pt also noted to have elevated bp, improved without treatment here, likely partially related to pain from bladder distension. Pt remains asymptomatic, comfortable, agrees to f/u with pcp and also counseled regarding ER return precautions. Strict returnprecautions and follow up instructions were discussed with the patient with which the patient agrees      ED Medications administered this visit:    Medications   hydrALAZINE (APRESOLINE) injection 10 mg (10 mg IntraVENous Given 6/16/22 2207)   cefTRIAXone (ROCEPHIN) 1000 mg IVPB in 50 mL D5W minibag (0 mg IntraVENous Stopped 6/16/22 2206)         Procedures: (None if blank)         CLINICAL IMPRESSION     1. Malfunction of Ortega catheter, initial encounter ([facility] [address]    2. Urinary tract infection associated with indwelling urethral catheter, initial encounter ([facility] [address]    3.  Essential hypertension          DISPOSITION/PLAN   DISPOSITION Decision To Discharge 06/16/2022 11:13:50 PM      PATIENT REFERRED TO:  Edie Reyez MD  [facility]  [address]  [address] [facility] [address] [phone]    In 2 days        DISCHARGE MEDICATIONS:  Discharge Medication List as of 6/16/2022 11:14 PM      START taking these medications    Details   nitrofurantoin, macrocrystal-monohydrate, (MACROBID) 100 MG capsule Take 1 capsule by mouth 2 times daily for 5 days, Disp-10 capsule, R-0Print                 (Please note that portions of this note were completed with a voice recognition program.  Efforts were made to edit the dictations but occasionallywords are mis-transcribed.)      Dorothye Sacks, MD,FACEP (electronically signed)  Attending Physician, Emergency Department        Parker Barreto MD  06/17/22 0803

## 2022-06-20 ENCOUNTER — TELEPHONE (OUTPATIENT)
Dept: PHARMACY | Age: 87
End: 2022-06-20

## 2022-06-20 NOTE — TELEPHONE ENCOUNTER
Pharmacy Note  ED Culture Follow-up    Parisa West is a 80 y.o. male. Allergies: Patient has no known allergies. Labs:  Lab Results   Component Value Date    BUN 15 06/16/2022    CREATININE 0.9 06/16/2022    WBC 6.8 06/16/2022     Estimated Creatinine Clearance: 65 mL/min (based on SCr of 0.9 mg/dL). Current antimicrobials:   · Macrobid 100 mg BID x 5 days    ASSESSMENT:  Micro results:   Urine culture: positive for Proteus mirabilis     PLAN:  Need for intervention: Yes  Discussed with: RANDALL Barragan  Chosen treatment:    · Stop macrobid and start Bactrim DS BID x 10 days    Patient response:   · Patient made aware of results and is currently having no urinary symptoms. He dose not feel the need to change antibiotics at this time. He will reach out to his urologist if he changes his mind. Called/sent in prescription to: Not applicable    Please call with any questions.  [address] [facility]    Charlie Bridges, [address] [place] [address], PharmD 4:39 PM 6/20/2022

## 2022-07-07 ENCOUNTER — NURSE ONLY (OUTPATIENT)
Dept: UROLOGY | Age: 87
End: 2022-07-07
Payer: COMMERCIAL

## 2022-07-07 DIAGNOSIS — R33.9 URINARY RETENTION: Primary | ICD-10-CM

## 2022-07-07 PROCEDURE — 51702 INSERT TEMP BLADDER CATH: CPT | Performed by: UROLOGY

## 2022-07-07 NOTE — PROGRESS NOTES
Pt showed up to office today because ortega not draining. Pt had appt tomorrow for cath change anyways (gets changed every 3 weeks) so just changed ortega today. Patient has given me verbal consent to perform catheter change Yes    Does patient have latex allergy? No  Does patient have shellfish or betadine allergy? No      Following Dr. Addie Gilbert of [facility]. 16 Fr Catheter changed without difficulty. Once balloon was deflated, removed catheter without difficulty. Cleansed urethral opening with betadine swab. 16 Fr regular oretga was inserted using sterile water-soluble lubricant without difficulty. Upon cath insertion got 250cc urine return. Inflated balloon with 10 ml of water. Ortega Catheter was hooked up to leg bag with straps. Foreskin reduced back down? Yes    Patient instructed on how to drain catheter bags. If patient was given a leg bag, patient was instructed to keep bag above the knee to prevent pulling on catheter. Pt notified if catheter is pulled on may cause pain and bleeding. Patient was also instructed on how to switch from leg bag to overnight bag. Supplies were provided by office    Bard order given? No    Per pt request sent urine for culture. Urine was cloudy. No odor. Pt states when it gets plugged its usually because he has a uti so culture sent.

## 2022-07-11 ENCOUNTER — TELEPHONE (OUTPATIENT)
Dept: UROLOGY | Age: 87
End: 2022-07-11

## 2022-07-11 LAB
ORGANISM: ABNORMAL
ORGANISM: ABNORMAL
URINE CULTURE, ROUTINE: ABNORMAL
URINE CULTURE, ROUTINE: ABNORMAL

## 2022-07-11 NOTE — TELEPHONE ENCOUNTER
Patient aware no indication to treat at this time and will keep having the catheter exchange every 3 weeks. He will call back with any issues.

## 2022-07-11 NOTE — TELEPHONE ENCOUNTER
Patient advised of the urine results. He denies any problems with the catheter or symptoms of infection such as pain, burning, or fever.

## 2022-07-11 NOTE — TELEPHONE ENCOUNTER
Patient called back stating the catheter plugs up every 3 weeks from the infection. The urine is light yellow.

## 2022-07-18 ENCOUNTER — HOSPITAL ENCOUNTER (EMERGENCY)
Age: 87
Discharge: HOME | End: 2022-07-18
Payer: COMMERCIAL

## 2022-07-18 VITALS
BODY MASS INDEX: 22.75 KG/M2 | HEART RATE: 96 BPM | HEIGHT: 72 IN | RESPIRATION RATE: 16 BRPM | OXYGEN SATURATION: 98 % | SYSTOLIC BLOOD PRESSURE: 178 MMHG | DIASTOLIC BLOOD PRESSURE: 82 MMHG | WEIGHT: 168 LBS | TEMPERATURE: 98.4 F

## 2022-07-18 DIAGNOSIS — N39.0 URINARY TRACT INFECTION ASSOCIATED WITH INDWELLING URETHRAL CATHETER, INITIAL ENCOUNTER (HCC): ICD-10-CM

## 2022-07-18 DIAGNOSIS — T83.511A URINARY TRACT INFECTION ASSOCIATED WITH INDWELLING URETHRAL CATHETER, INITIAL ENCOUNTER (HCC): ICD-10-CM

## 2022-07-18 DIAGNOSIS — R33.9 URINARY RETENTION: Primary | ICD-10-CM

## 2022-07-18 LAB
BACTERIA: ABNORMAL /HPF
BILIRUBIN URINE: NEGATIVE
BLOOD, URINE: ABNORMAL
CASTS 2: ABNORMAL /LPF
CASTS UA: ABNORMAL /LPF
CHARACTER, URINE: ABNORMAL
COLOR: YELLOW
CRYSTALS, UA: ABNORMAL
EPITHELIAL CELLS, UA: ABNORMAL /HPF
GLUCOSE URINE: NEGATIVE MG/DL
KETONES, URINE: NEGATIVE
LEUKOCYTE ESTERASE, URINE: ABNORMAL
MISCELLANEOUS 2: ABNORMAL
NITRITE, URINE: POSITIVE
PH UA: 6.5 (ref 5–9)
PROTEIN UA: 30
RBC URINE: ABNORMAL /HPF
RENAL EPITHELIAL, UA: ABNORMAL
SPECIFIC GRAVITY, URINE: 1.01 (ref 1–1.03)
UROBILINOGEN, URINE: 0.2 EU/DL (ref 0–1)
WBC UA: > 200 /HPF
YEAST: ABNORMAL

## 2022-07-18 PROCEDURE — 87077 CULTURE AEROBIC IDENTIFY: CPT

## 2022-07-18 PROCEDURE — 81001 URINALYSIS AUTO W/SCOPE: CPT

## 2022-07-18 PROCEDURE — 87086 URINE CULTURE/COLONY COUNT: CPT

## 2022-07-18 PROCEDURE — 87186 SC STD MICRODIL/AGAR DIL: CPT

## 2022-07-18 PROCEDURE — 99283 EMERGENCY DEPT VISIT LOW MDM: CPT

## 2022-07-18 RX ORDER — CEFDINIR 300 MG/1
300 CAPSULE ORAL 2 TIMES DAILY
Qty: 14 CAPSULE | Refills: 0 | Status: SHIPPED | OUTPATIENT
Start: 2022-07-18 | End: 2022-07-25

## 2022-07-19 ENCOUNTER — TELEPHONE (OUTPATIENT)
Dept: INTERNAL MEDICINE CLINIC | Age: 87
End: 2022-07-19

## 2022-07-19 ASSESSMENT — ENCOUNTER SYMPTOMS
ABDOMINAL PAIN: 1
NAUSEA: 0
RHINORRHEA: 0
COUGH: 0
CHEST TIGHTNESS: 0
EYE REDNESS: 0
VOMITING: 0
BACK PAIN: 0

## 2022-07-19 NOTE — ED PROVIDER NOTES
[facility] Emergency Department    CHIEF COMPLAINT       Chief Complaint   Patient presents with    Urinary Catheter     Plugged         Nurses Notes reviewed and I agree except as noted in the HPI. HISTORY OF PRESENT ILLNESS    Salinas Jones bee 80 y.o. male who presents to the ED for evaluation of a clogged ortega catheter. The patient states that he has had a ortega for several months due to incomplete emptying. His ortega became plugged tonight and he has not been able to void x 4 hours. Is very uncomfortable. HPI was provided by the patient    REVIEW OF SYSTEMS     Review of Systems   Constitutional:  Negative for chills, fatigue and fever. HENT:  Negative for congestion, ear discharge, ear pain, postnasal drip and rhinorrhea. Eyes:  Negative for redness. Respiratory:  Negative for cough and chest tightness. Cardiovascular:  Negative for chest pain and leg swelling. Gastrointestinal:  Positive for abdominal pain. Negative for nausea and vomiting. Genitourinary:  Positive for difficulty urinating. Negative for dysuria, enuresis, flank pain and hematuria. Musculoskeletal:  Negative for back pain and joint swelling. Skin:  Negative for rash. Neurological:  Negative for dizziness, light-headedness, numbness and headaches. Psychiatric/Behavioral:  Negative for agitation, behavioral problems and confusion. All other systems negative except as noted. PAST MEDICAL HISTORY     Past Medical History:   Diagnosis Date    Hyperlipidemia     Hypertension     Prostate cancer ([facility] [address]        SURGICALHISTORY      has a past surgical history that includes hernia repair; Hemorrhoid surgery (1970); Prostate surgery (2005); Tonsillectomy; Colonoscopy; pr colonoscopy flx dx w/collj spec when pfrmd (Left, 6/11/2018); and Cystoscopy (Left, 10/14/2021).     CURRENT MEDICATIONS       Discharge Medication List as of 7/18/2022 11:30 PM        CONTINUE these medications which have NOT CHANGED    Details   tamsulosin (FLOMAX) 0.4 mg capsule Take 1 capsule by mouth 2 times daily, Disp-60 capsule, R-1Normal      mirtazapine (REMERON) 30 MG tablet Take 1 tablet by mouth nightly, Disp-30 tablet, R-5Normal      Elastic Bandages & Supports (B & B HERNIA BELT) MISC Disp-1 each, R-1, PrintBrand per patient preference      Melatonin 10 MG TABS Take by mouth at bedtimeHistorical Med      amLODIPine (NORVASC) 10 MG tablet TAKE 1 TABLET EVERY DAY, Disp-90 tablet, R-3Normal      lisinopril (PRINIVIL;ZESTRIL) 10 MG tablet TAKE 1 TABLET TWICE DAILY, Disp-180 tablet, R-3Normal      Acetaminophen (TYLENOL EXTRA STRENGTH PO) Take by mouth prnHistorical Med      Calcium Carb-Cholecalciferol (CALCIUM 1000 + D PO) Take 1 tablet by mouth dailyHistorical Med      Apoaequorin (PREVAGEN) 10 MG CAPS Take 10 mg by mouth dailyHistorical Med      MULTIPLE VITAMIN PO Take by mouth dailyHistorical Med             ALLERGIES     has No Known Allergies. FAMILY HISTORY     He indicated that his mother is . He indicated that his father is . family history includes Diabetes in his father.     SOCIAL HISTORY       Social History     Socioeconomic History    Marital status:      Spouse name: Not on file    Number of children: Not on file    Years of education: Not on file    Highest education level: Not on file   Occupational History    Not on file   Tobacco Use    Smoking status: Former     Types: Cigarettes     Quit date:      Years since quittin.5    Smokeless tobacco: Never   Vaping Use    Vaping Use: Never used   Substance and Sexual Activity    Alcohol use: No    Drug use: No    Sexual activity: Not on file   Other Topics Concern    Not on file   Social History Narrative    Not on file     Social Determinants of Health     Financial Resource Strain: Low Risk     Difficulty of Paying Living Expenses: Not hard at all   Food Insecurity: No Food Insecurity    Worried About [facility] in the Last Year: Never true    Ran Out of Food in the Last Year: Never true   Transportation Needs: Not on file   Physical Activity: Not on file   Stress: Not on file   Social Connections: Not on file   Intimate Partner Violence: Not on file   Housing Stability: Not on file       PHYSICAL EXAM     INITIAL VITALS:  height is 6' (1.829 m) and weight is 168 lb (76.2 kg). His temperature is 98.4 °F (36.9 °C). His blood pressure is 178/82 (abnormal) and his pulse is 96. His respiration is 16 and oxygen saturation is 98%. Physical Exam  Constitutional:       Appearance: Normal appearance. He is well-developed. He is not ill-appearing. HENT:      Head: Normocephalic and atraumatic. Nose: Nose normal.      Mouth/Throat:      Mouth: Mucous membranes are moist.      Pharynx: Oropharynx is clear. Eyes:      Conjunctiva/sclera: Conjunctivae normal.   Cardiovascular:      Rate and Rhythm: Normal rate. Pulses: Normal pulses. Pulmonary:      Effort: Pulmonary effort is normal.   Abdominal:      Palpations: Abdomen is soft. Genitourinary:     Comments: Garduno in place, not draining. Musculoskeletal:         General: Normal range of motion. Cervical back: Normal range of motion. Skin:     General: Skin is warm and dry. Capillary Refill: Capillary refill takes less than 2 seconds. Neurological:      General: No focal deficit present. Mental Status: He is alert and oriented to person, place, and time.    Psychiatric:         Behavior: Behavior normal.       DIFFERENTIAL DIAGNOSIS:   Urinary retention, UTI    DIAGNOSTIC RESULTS     EKG: All EKG's are interpreted by the Emergency Department Physician who eithersigns or Co-signs this chart in the absence of a cardiologist.        RADIOLOGY: non-plainfilm images(s) such as CT, Ultrasound and MRI are read by the radiologist.  Plain radiographic images are visualized and preliminarily interpreted by the emergency physician unless otherwise stated below. No orders to display         LABS:   Labs Reviewed   URINE WITH REFLEXED MICRO - Abnormal; Notable for the following components:       Result Value    Blood, Urine LARGE (*)     Protein, UA 30 (*)     Nitrite, Urine POSITIVE (*)     Leukocyte Esterase, Urine LARGE (*)     Character, Urine TURBID (*)     All other components within normal limits   CULTURE, REFLEXED, URINE    Narrative:     Source: cath urine       Site:           Current Antibiotics: not stated       EMERGENCY DEPARTMENT COURSE:   Vitals:    Vitals:    07/18/22 2057   BP: (!) 178/82   Pulse: 96   Resp: 16   Temp: 98.4 °F (36.9 °C)   SpO2: 98%   Weight: 168 lb (76.2 kg)   Height: 6' (1.829 m)                              Internal Administration   First Dose COVID-19, MODERNA BLUE border, Primary or Immunocompromised, (age 12y+), IM, 100 mcg/0.5mL  01/20/2021   Second Dose COVID-19, MODERNA BLUE border, Primary or Immunocompromised, (age 12y+), IM, 100 mcg/0.5mL   02/17/2021       Last COVID Lab No results found for: SARS-COV-2, SARS-COV-2 RNA, SARS-COV-2, SARS-COV-2, SARS-COV-2 BY PCR, SARS-COV-2, SARS-COV-2, SARS-COV-2         MDM    Patient was seen evaluate in the urgency room for acute urinary retention. Garduno catheter was changed by nursing staff. Patient was able to drain immediately. He is feeling much better. She has a UTI. He will be treated with a course of Bactrim. He is instructed to call his PCP and his urologist tomorrow. Patient is agreeable to plan of care and is discharged home in stable condition. No notes of EC Admission Criteria type on file. Medications - No data to display    Please note that the patient was evaluated during a pandemic. All efforts were made for HIPPA compliance as well as provision of appropriate care. Patient was seen independently by myself. The patient's final impression and disposition and plan was determined by myself.      Strict return precautions and follow up instructions

## 2022-07-19 NOTE — ED TRIAGE NOTES
Pt to ER with complaints of his urinary catheter being clogged. He states for the past 3-4 hours he hasn't had any output and has developed urinary pressure.

## 2022-07-19 NOTE — TELEPHONE ENCOUNTER
I notified pt and his wife of this. Pt keeps getting recurrent infections. He can not seem to go any longer than 3 weeks with a cather change. He had a UTI just 11 days ago. He was not put on antibiotics at that time. He and his wife are concerned that it does not seem that he is getting completely over these infections. They do have an appt with Dr. Cabrera Wagner on 7/29/22 for a catheter change and also to see dr Justin Christine in regard to repeated UTIs. Any further recommendations or comments? Patient reports posterior headaches x 2 weeks. Occasional nausea with headaches but no changes in vision.  Has appointment with dora on 1/5/2017

## 2022-07-20 NOTE — TELEPHONE ENCOUNTER
Received:  Today  MD Nolvia Wise MA  Tell him a chronic ortega generally will grow something; we do not treat it unless sxs eg fever, dysuria; not necessarily a plugged catheter; tell him todiscuss with Dr Juan J Johnson however

## 2022-07-21 ENCOUNTER — TELEPHONE (OUTPATIENT)
Dept: UROLOGY | Age: 87
End: 2022-07-21

## 2022-07-21 ENCOUNTER — HOSPITAL ENCOUNTER (EMERGENCY)
Age: 87
Discharge: HOME | End: 2022-07-21
Payer: COMMERCIAL

## 2022-07-21 ENCOUNTER — TELEPHONE (OUTPATIENT)
Dept: INTERNAL MEDICINE CLINIC | Age: 87
End: 2022-07-21

## 2022-07-21 VITALS
SYSTOLIC BLOOD PRESSURE: 157 MMHG | OXYGEN SATURATION: 93 % | HEART RATE: 89 BPM | RESPIRATION RATE: 20 BRPM | DIASTOLIC BLOOD PRESSURE: 75 MMHG | TEMPERATURE: 100.5 F

## 2022-07-21 DIAGNOSIS — Z96.0 INDWELLING CATHETER PRESENT ON ADMISSION: Primary | ICD-10-CM

## 2022-07-21 DIAGNOSIS — U07.1 COVID-19 VIRUS INFECTION: ICD-10-CM

## 2022-07-21 DIAGNOSIS — A49.8 PSEUDOMONAS AERUGINOSA INFECTION: ICD-10-CM

## 2022-07-21 LAB
ORGANISM: ABNORMAL
SARS-COV-2, NAA: DETECTED
URINE CULTURE REFLEX: ABNORMAL

## 2022-07-21 PROCEDURE — 99213 OFFICE O/P EST LOW 20 MIN: CPT

## 2022-07-21 PROCEDURE — 87635 SARS-COV-2 COVID-19 AMP PRB: CPT

## 2022-07-21 PROCEDURE — 99213 OFFICE O/P EST LOW 20 MIN: CPT | Performed by: NURSE PRACTITIONER

## 2022-07-21 RX ORDER — GRANULES FOR ORAL 3 G/1
3 POWDER ORAL ONCE
Qty: 1 EACH | Refills: 0 | Status: SHIPPED | OUTPATIENT
Start: 2022-07-21 | End: 2022-07-21

## 2022-07-21 ASSESSMENT — ENCOUNTER SYMPTOMS
SINUS PRESSURE: 1
DIARRHEA: 0
NAUSEA: 0
COUGH: 1
CHOKING: 0
FLU SYMPTOMS: 1
ABDOMINAL PAIN: 0
APNEA: 0
WHEEZING: 0
VOMITING: 0
CHEST TIGHTNESS: 0
SORE THROAT: 0
SHORTNESS OF BREATH: 0
RHINORRHEA: 1
STRIDOR: 0

## 2022-07-21 ASSESSMENT — PAIN DESCRIPTION - LOCATION: LOCATION: THROAT

## 2022-07-21 ASSESSMENT — PAIN SCALES - GENERAL: PAINLEVEL_OUTOF10: 4

## 2022-07-21 ASSESSMENT — PAIN - FUNCTIONAL ASSESSMENT: PAIN_FUNCTIONAL_ASSESSMENT: 0-10

## 2022-07-21 NOTE — DISCHARGE INSTRUCTIONS
Patient or Patient designated representative was advised to drink plenty of water or fluids and take medication as prescribed. The patient or Patient designated representative is advised to monitor for any changes in pain, development of high fever, chills, persistent vomiting, development of increasing back or flank pain or increase in hematuria the patient is advised to go to the emergency department for reevaluation and further follow-up if they wouldn't notice any of the above symptoms. The patient or Patient designated representative was also advised to follow up with family doctor or primary care provider after the antibiotic is completed for repeat urinalysis. The patient did verbalize understanding of discharge instructions and is agreeable to the treatment plan. The patient left ambulatory without any changes or concerns in stable condition.

## 2022-07-21 NOTE — ED PROVIDER NOTES
cancer [facility])        SURGICAL HISTORY     Patient  has a past surgical history that includes hernia repair; Hemorrhoid surgery (1970); Prostate surgery (2005); Tonsillectomy; Colonoscopy; pr colonoscopy flx dx w/collj spec when pfrmd (Left, 6/11/2018); and Cystoscopy (Left, 10/14/2021). CURRENT MEDICATIONS       Discharge Medication List as of 7/21/2022  5:21 PM        CONTINUE these medications which have NOT CHANGED    Details   cefdinir (OMNICEF) 300 MG capsule Take 1 capsule by mouth in the morning and 1 capsule before bedtime. Do all this for 7 days. , Disp-14 capsule, R-0Normal      tamsulosin (FLOMAX) 0.4 mg capsule Take 1 capsule by mouth 2 times daily, Disp-60 capsule, R-1Normal      mirtazapine (REMERON) 30 MG tablet Take 1 tablet by mouth nightly, Disp-30 tablet, R-5Normal      Elastic Bandages & Supports (B & B HERNIA BELT) MISC Disp-1 each, R-1, PrintBrand per patient preference      Melatonin 10 MG TABS Take by mouth at bedtimeHistorical Med      amLODIPine (NORVASC) 10 MG tablet TAKE 1 TABLET EVERY DAY, Disp-90 tablet, R-3Normal      lisinopril (PRINIVIL;ZESTRIL) 10 MG tablet TAKE 1 TABLET TWICE DAILY, Disp-180 tablet, R-3Normal      Acetaminophen (TYLENOL EXTRA STRENGTH PO) Take by mouth prnHistorical Med      Calcium Carb-Cholecalciferol (CALCIUM 1000 + D PO) Take 1 tablet by mouth dailyHistorical Med      Apoaequorin (PREVAGEN) 10 MG CAPS Take 10 mg by mouth dailyHistorical Med      MULTIPLE VITAMIN PO Take by mouth dailyHistorical Med             ALLERGIES     Patient is has No Known Allergies. FAMILY HISTORY     Patient's family history includes Diabetes in his father. SOCIAL HISTORY     Patient  reports that he quit smoking about 54 years ago. He has never used smokeless tobacco. He reports that he does not drink alcohol and does not use drugs.     PHYSICAL EXAM     ED TRIAGE VITALS  BP: (!) 157/75, Temp: (!) 100.5 °F (38.1 °C) (99.9 0rally), Heart Rate: 89, Resp: 20, SpO2: 93 %  Physical

## 2022-07-21 NOTE — TELEPHONE ENCOUNTER
Message  Received:  Today  MD Odessa Sumner MA  It's likely viral.  Try saline gargles, robitussin dm

## 2022-07-21 NOTE — TELEPHONE ENCOUNTER
Pt called back saying he spoke with the nurse at Dr. Dulce Saravia office and she agree with Dr. Dinah Sow that the antibiotic should be taken no more than 2 days if no fever or dysuria. Pt states he has had a chronic dry cough over the last 4-5 days. Today he woke up with a sore throat and voice is deep and raspy. He has not checked his temp and if elevated will call back but he is wondering if the antibiotic they gave him in ER would work for his sore throat.     Pt called back his temp this am was 100.1  the first time and then later   98.4

## 2022-07-21 NOTE — ED TRIAGE NOTES
Did go to ED last Monday for trouble with catheter, was put on antibiotic, and it was discontinued by urologist after 2 days

## 2022-07-22 ENCOUNTER — CARE COORDINATION (OUTPATIENT)
Dept: CARE COORDINATION | Age: 87
End: 2022-07-22

## 2022-07-22 ENCOUNTER — TELEPHONE (OUTPATIENT)
Dept: UROLOGY | Age: 87
End: 2022-07-22

## 2022-07-22 NOTE — TELEPHONE ENCOUNTER
Patient does not have any UTI symptoms and was given fosfomycin in the urgent care for UTI. Should he still pick that up and take it? He went the urgent care because of sore throat and was DX with COVID. Patient also questioned if he really needs the antibiotics that were going to be prescribed for the coivid since he does not feel bad. His throat feels better today. Advised to call Dr Mac Glover office for clarification since covid is medically managed and we were not the ordering provider. Please advise.  Thank you

## 2022-07-22 NOTE — TELEPHONE ENCOUNTER
No need to treat if not having sx of UTI  Discuss with primary regarding other questions  I have personally verified, reviewed, and approved these actions.

## 2022-07-22 NOTE — PROGRESS NOTES
Pharmacy Note  ED Culture Follow-up    Latha Rousseau is a 80 y.o. male. Allergies: Patient has no known allergies. Labs:  Lab Results   Component Value Date    BUN 15 06/16/2022    CREATININE 0.9 06/16/2022    WBC 6.8 06/16/2022     CrCl cannot be calculated (Patient's most recent lab result is older than the maximum 30 days allowed. ). Current antimicrobials:   Cefdinir 300 mg BID x 7 days, fosfomycin 3g x1    ASSESSMENT:  Micro results:   Urine culture: positive for pseudomonas aeruginosa     PLAN:  Need for intervention: Yes, but will defer to specialist. Patient in contact with urology and instructed not to take antibiotic unless having symptoms. Discussed with: Dinora Grullon PA-C  Chosen treatment:    Patient will be treated by specialist    Patient response:   No need to contact patient    Called/sent in prescription to: Not applicable    Please call with any questions.  Adriana Brooke, [facility], PharmD 2:50 PM 7/22/2022

## 2022-07-22 NOTE — TELEPHONE ENCOUNTER
Patient advised if he does not have symptoms of UTI does not need to take fosfomycin.  He will also call the office prior to coming to the appointment to report how he is feeling since he has covid

## 2022-07-22 NOTE — CARE COORDINATION
Attempted to reach patient for care coordination COVID monitoring follow up call.   Left message on voicemail for return call to this [facility] at [phone]

## 2022-07-25 ENCOUNTER — TELEPHONE (OUTPATIENT)
Dept: INTERNAL MEDICINE CLINIC | Age: 87
End: 2022-07-25

## 2022-07-25 NOTE — TELEPHONE ENCOUNTER
Pt wanted to report in to let us know that he is doing well. He did not take the paxlovid. He waited a day and was beginning to feel better. His main symptoms were sore throat and very fatigued. He does have a dry cough and only once in a while will bring something up but is doing much much better.

## 2022-07-27 ENCOUNTER — TELEPHONE (OUTPATIENT)
Dept: UROLOGY | Age: 87
End: 2022-07-27

## 2022-07-27 NOTE — TELEPHONE ENCOUNTER
Called stating had blood in tubing of catheter. Then while sitting in chair the urine is clear. States appears to see a blood clot in the bag. Encouraged to increased. If bleeding continues to go to the urgent care or er to be evaluated. Verbalized understanding.

## 2022-07-29 ENCOUNTER — OFFICE VISIT (OUTPATIENT)
Dept: UROLOGY | Age: 87
End: 2022-07-29
Payer: COMMERCIAL

## 2022-07-29 VITALS — BODY MASS INDEX: 22.48 KG/M2 | HEIGHT: 72 IN | RESPIRATION RATE: 16 BRPM | WEIGHT: 166 LBS

## 2022-07-29 DIAGNOSIS — R33.9 URINARY RETENTION: ICD-10-CM

## 2022-07-29 DIAGNOSIS — N35.916 STRICTURE OF OVERLAPPING SITES OF URETHRA IN MALE, UNSPECIFIED STRICTURE TYPE: ICD-10-CM

## 2022-07-29 DIAGNOSIS — N39.0 RECURRENT UTI: Primary | ICD-10-CM

## 2022-07-29 DIAGNOSIS — Z92.3 HISTORY OF BRACHYTHERAPY: ICD-10-CM

## 2022-07-29 DIAGNOSIS — Z85.46 HISTORY OF PROSTATE CANCER: ICD-10-CM

## 2022-07-29 PROCEDURE — 81003 URINALYSIS AUTO W/O SCOPE: CPT | Performed by: UROLOGY

## 2022-07-29 PROCEDURE — 99214 OFFICE O/P EST MOD 30 MIN: CPT | Performed by: UROLOGY

## 2022-07-29 PROCEDURE — 1123F ACP DISCUSS/DSCN MKR DOCD: CPT | Performed by: UROLOGY

## 2022-07-29 NOTE — PROGRESS NOTES
JUN Thayer MD        [address] [facility] [place] [address] [facility] [phone] [id number]  Dept: [phone]  Dept Fax: [phone]: [address] [address] Urology Office Note -     Patient:  Marga Rosenthal  YOB: 1935  Date: 7/29/2022    The patient is a 80 y.o. male who presents today for evaluation of the following problems:   Chief Complaint   Patient presents with    Urinary Tract Infection    referred/consultation requested by Heena Levine MD.    HISTORY OF PRESENT ILLNESS:       Bulbar urethral stricture  Failed voiding trials   Has indwelling catheter  Stricture has been dilated in the past  Hx of brachytherapy    Gross hematuria  Several episodes-- blood clots  Scheduled for cystoscopy and CT urogram    Prostate cancer  Hx of brachytherapy  Needs a PSA      Requested/reviewed records from Heena Levine MD office and/or outside [de-identified]    (Patient's old records have been requested, reviewed and pertinent findings summarized in today's note.)    Procedures Today: N/A      Last several PSA's:  Lab Results   Component Value Date    PSA <0.02 05/08/2015       Last total testosterone:  No results found for: TESTOSTERONE    Urinalysis today:  No results found for this visit on 07/29/22. Last BUN and creatinine:  Lab Results   Component Value Date    BUN 15 06/16/2022     Lab Results   Component Value Date    CREATININE 0.9 06/16/2022         Imaging Reviewed during this Office Visit:   Jc Montilla MD independently reviewed the images and verified the radiology reports from:    DEXA BONE DENSITY AXIAL SKELETON    Result Date: 3/12/2021  EXAM: DEXA BONE DENSITY AXIAL SKELETON HISTORY: 80 years, Male, Localized osteoporosis without current pathological fracture COMPARISON: 12/26/2018. FINDINGS: Bone densitometry has been performed using a Hologic bone densitometer.  The routine evaluation includes the lumbar spine and both hips. Evaluation of the L1, L3, L4 of the lumbar spine demonstrates an average bone mineral density measurement of 0.984g/cm2 which corresponds to a T-score of -1.0 and a Z-score of 0.3. Previously average bone mineral density measurement of 1.022 g/cm2 which corresponds to a T-score of -0.6 and a Z-score of 0.6. This qualifies as normal bone mineral density. The calculated bone density in the left femoral neck is 0.629 g/cm2 which corresponds to a T-score of  -2.2 and a Z-score of -0.5. Previously average bone mineral density measurement of 0.611 g/cm2 which corresponds to a T-score of -2.3 and a Z-score of -0.7. This qualifies as osteopenia. The calculated bone density in the right femoral neck is 0.689 g/cm2 which corresponds to a T-score of  minus 1. and a Z-score of -0.1. Previously average bone mineral density measurement of 0.659 g/cm2 which corresponds to a T-score of -2.0 and a Z-score of -0.3. This qualifies as osteopenia. BMD changes versus previous is -3.7% in the spine. BMD change versus previous is 3.9% for the hips using the mean. This patient is osteopenic using the World Health Organization criteria. The patient is at a medium risk for fracture. 10 year probability of major osteoporotic fracture: 21% 10 year probability of hip fracture: 16% The patient's meets criteria for referral to a bone fragility clinic. **This report has been created using voice recognition software. It may contain minor errors which are inherent in voice recognition technology. ** Final report electronically signed by Dr. Avery Gant on 3/12/2021 1:32 PM      PAST MEDICAL, FAMILY AND SOCIAL HISTORY:  Past Medical History:   Diagnosis Date    Hyperlipidemia     Hypertension     Prostate cancer [facility])      Past Surgical History:   Procedure Laterality Date    COLONOSCOPY      CYSTOSCOPY Left 10/14/2021    CYSTOSCOPY EVACUATION OF CLOTS, EVACUATION OF BLADDER STONE, CHANNEL TURP  performed by John Peres MD at [facility] OR    HEMORRHOID SURGERY  1970    HERNIA REPAIR      NM COLONOSCOPY FLX DX W/COLLJ SPEC WHEN PFRMD Left 2018    COLONOSCOPY performed by Maria Antonia Verde MD at [address]      seed implant    TONSILLECTOMY       Family History   Problem Relation Age of Onset    Diabetes Father      No outpatient medications have been marked as taking for the 22 encounter (Office Visit) with Dwaine Gtz MD.       Patient has no known allergies. Social History     Tobacco Use   Smoking Status Former    Types: Cigarettes    Quit date:     Years since quittin.5   Smokeless Tobacco Never      (If patient a smoker, smoking cessation counseling offered)   Social History     Substance and Sexual Activity   Alcohol Use No       REVIEW OF SYSTEMS:  Constitutional: negative  Eyes: negative  Respiratory: negative  Cardiovascular: negative  Gastrointestinal: negative  Genitourinary: see HPI  Musculoskeletal: negative  Skin: negative   Neurological: negative  Hematological/Lymphatic: negative  Psychological: negative        Physical Exam:    This a 80 y.o. male  Vitals:    22 0931   Resp: 16     Body mass index is 22.51 kg/m². Constitutional: Patient in no acute distress;         Assessment and Plan        1. Recurrent UTI    2. Stricture of overlapping sites of urethra in male, unspecified stricture type    3. History of prostate cancer    4. History of brachytherapy    5. Urinary retention               Plan:         Bulbar stricture- secondary to radiation. Has been dilated in past. Gets monthly catheter changes and catheter does get plugged frequently. Gross hematuria- secondary to radiation. Did have recent ED visit and hematuria. Discussed causes  Recurrent uti- treat symptomatic infections. Hx of prostate cancer- s/p brachytherapy. Needs PSA       Catheter change every month.  Change catheter today  Needs irrigation supplies  Did offer gen surg referral for poss new hernia on right side--not bothering pt so he will hold off  Keep follow up. Prescriptions Ordered:  No orders of the defined types were placed in this encounter.      Orders Placed:  Orders Placed This Encounter   Procedures    POCT Urinalysis No Micro (Auto)            JUN DOUGHERTY [facility], MD

## 2022-07-29 NOTE — PROGRESS NOTES
Patient has given me verbal consent to perform catheter change Yes    Does patient have latex allergy? No  Does patient have shellfish or betadine allergy? No    Following Dr. Freire [facility]. 16 Fr Catheter changed without difficulty. Once balloon was deflated, removed catheter without difficulty. Cleansed urethral opening with betadine swab. 16 Fr regular ortega was inserted using sterile water-soluble lubricant without difficulty. Catheter was flushed with 50cc water ensuring return and inflated balloon with 10 ml of water. Ortega Catheter was hooked up to leg bag with straps. Foreskin reduced back down? No    Patient instructed on how to drain catheter bags. If patient was given a leg bag, patient was instructed to keep bag above the knee to prevent pulling on catheter. Pt notified if catheter is pulled on may cause pain and bleeding. Patient was also instructed on how to switch from leg bag to overnight bag. Supplies were provided by office  Pt given extra overnight bag. Bard order given? No    Instructed pt how to irrigate catheter.

## 2022-08-10 ENCOUNTER — TELEPHONE (OUTPATIENT)
Dept: INTERNAL MEDICINE CLINIC | Age: 87
End: 2022-08-10

## 2022-08-10 ENCOUNTER — HOSPITAL ENCOUNTER (EMERGENCY)
Age: 87
Discharge: HOME | End: 2022-08-10
Attending: EMERGENCY MEDICINE
Payer: COMMERCIAL

## 2022-08-10 ENCOUNTER — TELEPHONE (OUTPATIENT)
Dept: UROLOGY | Age: 87
End: 2022-08-10

## 2022-08-10 VITALS
HEART RATE: 72 BPM | WEIGHT: 165 LBS | OXYGEN SATURATION: 97 % | BODY MASS INDEX: 22.35 KG/M2 | TEMPERATURE: 97.4 F | HEIGHT: 72 IN | RESPIRATION RATE: 18 BRPM | DIASTOLIC BLOOD PRESSURE: 71 MMHG | SYSTOLIC BLOOD PRESSURE: 147 MMHG

## 2022-08-10 DIAGNOSIS — T83.511A URINARY TRACT INFECTION ASSOCIATED WITH INDWELLING URETHRAL CATHETER, INITIAL ENCOUNTER (HCC): ICD-10-CM

## 2022-08-10 DIAGNOSIS — R33.8 ACUTE URINARY RETENTION: Primary | ICD-10-CM

## 2022-08-10 DIAGNOSIS — N39.0 URINARY TRACT INFECTION ASSOCIATED WITH INDWELLING URETHRAL CATHETER, INITIAL ENCOUNTER (HCC): ICD-10-CM

## 2022-08-10 LAB
BACTERIA: ABNORMAL /HPF
BILIRUBIN URINE: NEGATIVE
BLOOD, URINE: ABNORMAL
CASTS 2: ABNORMAL /LPF
CASTS UA: ABNORMAL /LPF
CHARACTER, URINE: ABNORMAL
COLOR: YELLOW
CRYSTALS, UA: ABNORMAL
EPITHELIAL CELLS, UA: ABNORMAL /HPF
GLUCOSE URINE: NEGATIVE MG/DL
KETONES, URINE: NEGATIVE
LEUKOCYTE ESTERASE, URINE: ABNORMAL
MISCELLANEOUS 2: ABNORMAL
NITRITE, URINE: POSITIVE
PH UA: 7 (ref 5–9)
PROTEIN UA: 30
RBC URINE: ABNORMAL /HPF
RENAL EPITHELIAL, UA: ABNORMAL
SPECIFIC GRAVITY, URINE: 1.01 (ref 1–1.03)
UROBILINOGEN, URINE: 0.2 EU/DL (ref 0–1)
WBC UA: > 200 /HPF
YEAST: ABNORMAL

## 2022-08-10 PROCEDURE — 99283 EMERGENCY DEPT VISIT LOW MDM: CPT

## 2022-08-10 PROCEDURE — 87077 CULTURE AEROBIC IDENTIFY: CPT

## 2022-08-10 PROCEDURE — 87186 SC STD MICRODIL/AGAR DIL: CPT

## 2022-08-10 PROCEDURE — 6370000000 HC RX 637 (ALT 250 FOR IP): Performed by: EMERGENCY MEDICINE

## 2022-08-10 PROCEDURE — 81001 URINALYSIS AUTO W/SCOPE: CPT

## 2022-08-10 PROCEDURE — 51702 INSERT TEMP BLADDER CATH: CPT

## 2022-08-10 PROCEDURE — 87086 URINE CULTURE/COLONY COUNT: CPT

## 2022-08-10 RX ORDER — LIDOCAINE HYDROCHLORIDE 20 MG/ML
JELLY TOPICAL ONCE
Status: COMPLETED | OUTPATIENT
Start: 2022-08-10 | End: 2022-08-10

## 2022-08-10 RX ORDER — SULFAMETHOXAZOLE AND TRIMETHOPRIM 800; 160 MG/1; MG/1
1 TABLET ORAL ONCE
Status: COMPLETED | OUTPATIENT
Start: 2022-08-10 | End: 2022-08-10

## 2022-08-10 RX ORDER — LIDOCAINE HYDROCHLORIDE 20 MG/ML
JELLY TOPICAL PRN
Status: DISCONTINUED | OUTPATIENT
Start: 2022-08-10 | End: 2022-08-10

## 2022-08-10 RX ORDER — SULFAMETHOXAZOLE AND TRIMETHOPRIM 800; 160 MG/1; MG/1
1 TABLET ORAL 2 TIMES DAILY
Qty: 20 TABLET | Refills: 0 | Status: SHIPPED | OUTPATIENT
Start: 2022-08-10 | End: 2022-08-15 | Stop reason: ALTCHOICE

## 2022-08-10 RX ADMIN — LIDOCAINE HYDROCHLORIDE: 20 JELLY TOPICAL at 05:00

## 2022-08-10 RX ADMIN — SULFAMETHOXAZOLE AND TRIMETHOPRIM 1 TABLET: 800; 160 TABLET ORAL at 06:35

## 2022-08-10 ASSESSMENT — ENCOUNTER SYMPTOMS
PHOTOPHOBIA: 0
EYE ITCHING: 0
CONSTIPATION: 0
ABDOMINAL PAIN: 1
EYE REDNESS: 0
VOMITING: 0
EYE DISCHARGE: 0
BLOOD IN STOOL: 0
NAUSEA: 0
EYE PAIN: 0
ABDOMINAL DISTENTION: 0
DIARRHEA: 0
COUGH: 0
SHORTNESS OF BREATH: 0
CHOKING: 0
TROUBLE SWALLOWING: 0
CHEST TIGHTNESS: 0
SORE THROAT: 0
RHINORRHEA: 0
BACK PAIN: 0
VOICE CHANGE: 0
WHEEZING: 0
SINUS PRESSURE: 0

## 2022-08-10 ASSESSMENT — PAIN - FUNCTIONAL ASSESSMENT
PAIN_FUNCTIONAL_ASSESSMENT: 0-10
PAIN_FUNCTIONAL_ASSESSMENT: NONE - DENIES PAIN

## 2022-08-10 NOTE — ED NOTES
RN replaced pt's ortega catheter at this time. This RN will monitor urinary output to see if catheter is draining bladder.       Maxime Cota RN  08/10/22 6553

## 2022-08-10 NOTE — TELEPHONE ENCOUNTER
Pt left a message saying Dr. Stefan Ford would like him to take the antibiotic. I spoke with Dr. Kaley Lyn and he says OK to take but to push fluids. I called pt and spoke with his wife, Vincent. I advised her of Dr. Delcia Meckel recommendations. Vincent is wondering if there are any other alternatives? Vincent would kind of like a second opinion but pt does not seem very open to that. He does not seem to be able to go any longer that 10 or 11 days for his catheter to be changed and he has been on several antibiotics during this time. Pt is hesitant to even go anywhere as he is afraid things may clog up and have to go to an OOT ER. They are both frustrated and getting weary of this.

## 2022-08-10 NOTE — ED TRIAGE NOTES
Pt presents to the ED from home with complaints of his urinary catheter not draining, like he is retaining urine. Pt states this has happened before and he normally just gets it replaced. Pt states he is feeling pressure down there like he has to go pee, rates pain an 8/10. Pt states he has ortega catheter in due to radiation damage when he was getting treated for prostate cancer, so he cannot pee on his own. Pt is alert and oriented x 4 with respirations even and unlabored.

## 2022-08-10 NOTE — TELEPHONE ENCOUNTER
Pt called in saying he was in the ER again last night. He felt that his catheter was not draining and had a lot of pressure. He has had no fever. They changed out the ortega and checked the urine and has a UTI and prescribed bactrim x 10 days. Pt is wondering if he needs to fill it? The last time Dr. Cuate Nur recommended he not fill the script as he did not have any other symptoms.

## 2022-08-10 NOTE — TELEPHONE ENCOUNTER
Patient was in the ER last night for occluded catheter. He was prescribed Bactrim DS BID for 10 days. Should he start it or wait for the culture results? He denies any pain, fever, or chills. He denies symptoms of infection. The urine is clear light yellow. He tried to irrigate the catheter prior to going to the ER. He could flush it but could not get any return. Last night was the first time he tried to irrigate. Should he irrigate it on more of a routine basis? If so he will need supplies and willing to use Bard. He has been getting occluded every 10-15 days. His next lab visit is scheduled on 08/19/2022 for catheter exchange and last office visit was 07/29/2022. Please advise.  Thank you

## 2022-08-10 NOTE — DISCHARGE INSTRUCTIONS
Patient had an acute urinary retention with a Garduno catheter in place. This has been changed. He is instructed to follow-up with his urologist and call for an appointment within the next 1 to 2 days. Patient also has evidence of a urinary tract infection. Patient has been given prescription for antibiotics he is instructed to take those as prescribed. Patient is instructed to follow-up with primary care physician and do so within the next 1 to 2 days. Patient is instructed to return to the nearest emergency room for any new or worsening complaints.

## 2022-08-10 NOTE — ED NOTES
Pt denies having pain any more, catheter is draining consistently ( about 300 ml's out) and pt feels relieved. RN updated pt on POC. Call light is within reach.       Genaro Aldana RN  08/10/22 [id number]

## 2022-08-10 NOTE — TELEPHONE ENCOUNTER
Message sent to Dr Lucy Morales and received message back to Irrigate more routinely and lets do cath changes every three weeks

## 2022-08-10 NOTE — ED PROVIDER NOTES
[facility]  eMERGENCY dEPARTMENT eNCOUnter          CHIEF COMPLAINT       Chief Complaint   Patient presents with    Urinary Retention     with ortega catheter in        Nurses Notes reviewed and I agree except as noted in the HPI. HISTORY OF PRESENT ILLNESS    Amrita Johnson is a 80 y.o. male who presents for acute urinary retention. Patient states he is starting to have abdominal pain. It has not drained since noon. Patient states this is happened 7 or 8 times to him. They do not know why it is. He has not had any hematuria. He states he does have some sediment. He states that the Ortega is just seem to stop working. Patient sees urology with Dr. Brenda Dobson. Patient is not complaining of any fever chills sweats. He has not noticed any blood in his urine no blood in the stool. Patient is otherwise resting on the cot no apparent distress mild to moderate amount of pain. REVIEW OF SYSTEMS     Review of Systems   Constitutional:  Negative for activity change, appetite change, diaphoresis, fatigue and unexpected weight change. HENT:  Negative for congestion, ear discharge, ear pain, hearing loss, rhinorrhea, sinus pressure, sore throat, trouble swallowing and voice change. Eyes:  Negative for photophobia, pain, discharge, redness and itching. Respiratory:  Negative for cough, choking, chest tightness, shortness of breath and wheezing. Cardiovascular:  Negative for chest pain, palpitations and leg swelling. Gastrointestinal:  Positive for abdominal pain. Negative for abdominal distention, blood in stool, constipation, diarrhea, nausea and vomiting. Endocrine: Negative for polydipsia, polyphagia and polyuria. Genitourinary:  Positive for decreased urine volume. Negative for difficulty urinating, dysuria, enuresis, frequency, hematuria, penile discharge, penile pain, penile swelling, scrotal swelling, testicular pain and urgency.    Musculoskeletal:  Negative for arthralgias, back pain, gait problem, myalgias, neck pain and neck stiffness. Skin:  Negative for pallor and rash. Allergic/Immunologic: Negative for immunocompromised state. Neurological:  Negative for dizziness, tremors, seizures, syncope, facial asymmetry, weakness, light-headedness, numbness and headaches. Hematological:  Negative for adenopathy. Does not bruise/bleed easily. Psychiatric/Behavioral:  Negative for agitation, hallucinations and suicidal ideas. The patient is not nervous/anxious. PAST MEDICAL HISTORY    has a past medical history of Hyperlipidemia, Hypertension, and Prostate cancer ([facility], [place] [address] SURGICAL HISTORY      has a past surgical history that includes hernia repair; Hemorrhoid surgery (); Prostate surgery (); Tonsillectomy; Colonoscopy; pr colonoscopy flx dx w/collj spec when pfrmd (Left, 2018); and Cystoscopy (Left, 10/14/2021). CURRENT MEDICATIONS       Previous Medications    ACETAMINOPHEN (TYLENOL EXTRA STRENGTH PO)    Take by mouth prn    AMLODIPINE (NORVASC) 10 MG TABLET    TAKE 1 TABLET EVERY DAY    APOAEQUORIN (PREVAGEN) 10 MG CAPS    Take 10 mg by mouth daily    CALCIUM CARB-CHOLECALCIFEROL (CALCIUM 1000 + D PO)    Take 1 tablet by mouth daily    ELASTIC BANDAGES & SUPPORTS (B & B HERNIA BELT) MISC    Brand per patient preference    LISINOPRIL (PRINIVIL;ZESTRIL) 10 MG TABLET    TAKE 1 TABLET TWICE DAILY    MELATONIN 10 MG TABS    Take by mouth at bedtime    MIRTAZAPINE (REMERON) 30 MG TABLET    Take 1 tablet by mouth nightly    MULTIPLE VITAMIN PO    Take by mouth daily    TAMSULOSIN (FLOMAX) 0.4 MG CAPSULE    Take 1 capsule by mouth 2 times daily       ALLERGIES     has No Known Allergies. FAMILY HISTORY     He indicated that his mother is . He indicated that his father is . family history includes Diabetes in his father. SOCIAL HISTORY      reports that he quit smoking about 54 years ago. His smoking use included cigarettes.  He has never used smokeless tobacco. He reports that he does not drink alcohol and does not use drugs. PHYSICAL EXAM     INITIAL VITALS:  height is 6' (1.829 m) and weight is 165 lb (74.8 kg). His oral temperature is 97.4 °F (36.3 °C). His blood pressure is 147/71 (abnormal) and his pulse is 72. His respiration is 18 and oxygen saturation is 97%. Physical Exam  Vitals and nursing note reviewed. Constitutional:       General: He is not in acute distress. Appearance: He is well-developed. He is not diaphoretic. HENT:      Head: Normocephalic and atraumatic. Right Ear: External ear normal.      Left Ear: External ear normal.      Nose: Nose normal.   Eyes:      General: Lids are normal. No scleral icterus. Right eye: No discharge. Left eye: No discharge. Conjunctiva/sclera: Conjunctivae normal.      Right eye: No exudate. Left eye: No exudate. Pupils: Pupils are equal, round, and reactive to light. Neck:      Thyroid: No thyromegaly. Vascular: No JVD. Trachea: No tracheal deviation. Cardiovascular:      Rate and Rhythm: Normal rate and regular rhythm. Pulses: Normal pulses. Heart sounds: Normal heart sounds, S1 normal and S2 normal. No murmur heard. No friction rub. No gallop. Pulmonary:      Effort: Pulmonary effort is normal. No respiratory distress. Breath sounds: Normal breath sounds. No stridor. No wheezing or rales. Chest:      Chest wall: No tenderness. Abdominal:      General: Bowel sounds are normal. There is no distension. Palpations: Abdomen is soft. There is no mass. Tenderness: abdominal tenderness in the suprapubic area There is no right CVA tenderness, left CVA tenderness, guarding or rebound. Negative signs include Hill's sign, Rovsing's sign, McBurney's sign, psoas sign and obturator sign. Hernia: No hernia is present.       Comments: Urinary bladder is percussible to 2 cm above the pubic symphysis Musculoskeletal:         General: No tenderness. Normal range of motion. Cervical back: Normal range of motion and neck supple. Normal range of motion. Lymphadenopathy:      Cervical: No cervical adenopathy. Skin:     General: Skin is warm and dry. Findings: No bruising, ecchymosis, lesion or rash. Neurological:      Mental Status: He is alert and oriented to person, place, and time. Cranial Nerves: No cranial nerve deficit. Sensory: No sensory deficit. Coordination: Coordination normal.      Deep Tendon Reflexes: Reflexes are normal and symmetric. Psychiatric:         Speech: Speech normal.         Behavior: Behavior normal.         Thought Content: Thought content normal.         Judgment: Judgment normal.         DIFFERENTIAL DIAGNOSIS:   Acute urinary retention, urinary tract infection    DIAGNOSTIC RESULTS     EKG: All EKG's are interpreted by the Emergency Department Physician who either signs or Co-signs this chart in the absence of a cardiologist.  None    RADIOLOGY: non-plain film images(s) such as CT, Ultrasound and MRI are read by the radiologist.  No orders to display         LABS:   Labs Reviewed   URINE WITH REFLEXED MICRO - Abnormal; Notable for the following components:       Result Value    Blood, Urine SMALL (*)     Protein, UA 30 (*)     Nitrite, Urine POSITIVE (*)     Leukocyte Esterase, Urine LARGE (*)     Character, Urine CLOUDY (*)     All other components within normal limits   CULTURE, REFLEXED, URINE       EMERGENCY DEPARTMENT COURSE:   Vitals:    Vitals:    08/10/22 0435 08/10/22 0608   BP: (!) 169/81 (!) 147/71   Pulse: (!) 104 72   Resp: 18 18   Temp: 97.4 °F (36.3 °C)    TempSrc: Oral    SpO2: 96% 97%   Weight: 165 lb (74.8 kg)    Height: 6' (1.829 m)      Patient was assessed at bedside decision to switch out the Garduno was made. I did send urine to check for an infection. Here today Garduno catheter was replaced. Patient had good urine output.   He felt much relief. Here today the urine is found to be positive for urinary tract infection. Patient was given a dose of Bactrim here. He is instructed to follow-up with his urologist and call for an appointment within the next 1 to 2 days along with his primary care physician. Patient understood and agreed with the plan. Patient is subsequently discharged home in stable condition. Patient had an acute urinary retention with a Garduno catheter in place. This has been changed. He is instructed to follow-up with his urologist and call for an appointment within the next 1 to 2 days. Patient also has evidence of a urinary tract infection. Patient has been given prescription for antibiotics he is instructed to take those as prescribed. Patient is instructed to follow-up with primary care physician and do so within the next 1 to 2 days. Patient is instructed to return to the nearest emergency room for any new or worsening complaints. CRITICAL CARE:   None    CONSULTS:  None    PROCEDURES:  None    FINAL IMPRESSION      1. Acute urinary retention    2. Urinary tract infection associated with indwelling urethral catheter, initial encounter [facility])          DISPOSITION/PLAN   Discharge    PATIENT REFERRED TO:  Edilma Pickering MD  [facility] [address]  [address] [address]  [phone]    Call in 1 day      Ashish Stephenson MD  [address] [phone]    Call in 1 day      DISCHARGE MEDICATIONS:  New Prescriptions    SULFAMETHOXAZOLE-TRIMETHOPRIM (BACTRIM DS) 800-160 MG PER TABLET    Take 1 tablet by mouth in the morning and 1 tablet before bedtime. Do all this for 10 days.        (Please note that portions of this note were completed with a voice recognition program.  Efforts were made to edit the dictations but occasionally words are mis-transcribed.)    Orlin Gant, [address], DO  08/10/22 1876

## 2022-08-10 NOTE — TELEPHONE ENCOUNTER
Finish Bactrim  Irrigate as needed  Can discuss at further at catheter exchange  Maybe should do biweekly exchanges

## 2022-08-10 NOTE — TELEPHONE ENCOUNTER
Patient advised to take Bactrim and to irrigate more routinely. He will irrigate daily with one syringe full of saline or sterile water. He will need syringes, irrigation kits, and bottles of sterile water or saline to prevent sediment. Patient kept appointment on 08/19/2022 for next exchange     Can saline be sent to [facility] until his supplies are received from [facility]?

## 2022-08-11 NOTE — TELEPHONE ENCOUNTER
If patient needs to continue flomax he will need a refill and would also like saline sent to the pharmacy until he gets supplies from [facility].     Thank you

## 2022-08-11 NOTE — TELEPHONE ENCOUNTER
Pt was notified dr Billie Putnam unfortunately does not have much in the way of suggestions. Pt states he drinks a lot of fluids already. I advised him he could try cranberry juice and he says he likes that and will try it. I advised him also comes in a pill form. He states urology has suggested he irrigate the catheter once a day and they are ordering supplies for him to be able to do that. He is hopeful this will help.

## 2022-08-12 RX ORDER — SODIUM CHLORIDE 9 MG/ML
1000 INJECTION, SOLUTION INTRAVENOUS ONCE
Qty: 1000 ML | Refills: 3 | Status: SHIPPED | OUTPATIENT
Start: 2022-08-12 | End: 2022-08-16 | Stop reason: SDUPTHER

## 2022-08-12 RX ORDER — TAMSULOSIN HYDROCHLORIDE 0.4 MG/1
0.4 CAPSULE ORAL 2 TIMES DAILY
Qty: 60 CAPSULE | Refills: 1 | Status: SHIPPED | OUTPATIENT
Start: 2022-08-12 | End: 2022-10-12 | Stop reason: SDUPTHER

## 2022-08-12 NOTE — PROGRESS NOTES
Patient wife advised prescriptions were sent to the pharmacy. She stated pharmacy could not get the saline. Saline and syringes at the  to use until his supplies come from [facility]. She voiced understanding.

## 2022-08-15 ENCOUNTER — TELEPHONE (OUTPATIENT)
Dept: INTERNAL MEDICINE CLINIC | Age: 87
End: 2022-08-15

## 2022-08-15 NOTE — TELEPHONE ENCOUNTER
I spoke to pts wife and she states a nurse from ER called yesterday and changed to cephalexin 500 mg. Bid x 7 days. Pt stopped the bactrim and started this last night.

## 2022-08-15 NOTE — TELEPHONE ENCOUNTER
----- Message from Mark Tirmble MD sent at 8/12/2022  8:47 AM EDT -----  Tell pt stop bactrim; organism resistant.   Go on cipro 250 bid x 3 days  ----- Message -----  From: Latrell Marie Incoming Lab Results From [facility]  Sent: 8/10/2022   6:12 AM EDT  To: Mark Trimble MD

## 2022-08-16 RX ORDER — SODIUM CHLORIDE 9 MG/ML
INJECTION, SOLUTION INTRAVENOUS
Qty: 3000 ML | Refills: 12 | OUTPATIENT
Start: 2022-08-16 | End: 2022-09-22 | Stop reason: SDUPTHER

## 2022-08-22 ENCOUNTER — TELEPHONE (OUTPATIENT)
Dept: UROLOGY | Age: 87
End: 2022-08-22

## 2022-08-30 ENCOUNTER — TELEPHONE (OUTPATIENT)
Dept: UROLOGY | Age: 87
End: 2022-08-30

## 2022-08-30 NOTE — TELEPHONE ENCOUNTER
Called shannon  regarding alottment allowed by medicare. 1 catheter a month  4 syringes a month    For syrnges  May reuse    Clean with a dip in white vinegar  Rinse in distilled water  Use syringe  Wash with soap and water  Dip in white vinegar  Rinse in distilled water  Air dry   Place in new baggies to store    We will supply for now what medicare does not cover. Verbalized understanding.

## 2022-08-30 NOTE — TELEPHONE ENCOUNTER
Patient called in today questioning his catheter order and wondering why he hadn't gotten it, was told we ordered 2/month which was incorrect. Patient asked if we could supply his catheter on his appt 9/9 if he doesn't get them. We need to check the order and call the patient back.  [phone]

## 2022-09-06 ENCOUNTER — OFFICE VISIT (OUTPATIENT)
Dept: INTERNAL MEDICINE CLINIC | Age: 87
End: 2022-09-06
Payer: COMMERCIAL

## 2022-09-06 ENCOUNTER — NURSE ONLY (OUTPATIENT)
Dept: LAB | Age: 87
End: 2022-09-06

## 2022-09-06 VITALS
HEART RATE: 68 BPM | TEMPERATURE: 97.3 F | BODY MASS INDEX: 23.16 KG/M2 | DIASTOLIC BLOOD PRESSURE: 50 MMHG | HEIGHT: 72 IN | WEIGHT: 171 LBS | SYSTOLIC BLOOD PRESSURE: 110 MMHG

## 2022-09-06 DIAGNOSIS — I10 PRIMARY HYPERTENSION: ICD-10-CM

## 2022-09-06 DIAGNOSIS — R33.9 CHRONIC RETENTION OF URINE: ICD-10-CM

## 2022-09-06 DIAGNOSIS — I10 PRIMARY HYPERTENSION: Primary | ICD-10-CM

## 2022-09-06 DIAGNOSIS — I49.3 PVC (PREMATURE VENTRICULAR CONTRACTION): ICD-10-CM

## 2022-09-06 LAB
MAGNESIUM: 2.1 MG/DL (ref 1.6–2.4)
POTASSIUM SERPL-SCNC: 3.7 MEQ/L (ref 3.5–5.2)

## 2022-09-06 PROCEDURE — 99213 OFFICE O/P EST LOW 20 MIN: CPT | Performed by: INTERNAL MEDICINE

## 2022-09-06 PROCEDURE — G8420 CALC BMI NORM PARAMETERS: HCPCS | Performed by: INTERNAL MEDICINE

## 2022-09-06 PROCEDURE — G8427 DOCREV CUR MEDS BY ELIG CLIN: HCPCS | Performed by: INTERNAL MEDICINE

## 2022-09-06 PROCEDURE — 1123F ACP DISCUSS/DSCN MKR DOCD: CPT | Performed by: INTERNAL MEDICINE

## 2022-09-06 PROCEDURE — 93000 ELECTROCARDIOGRAM COMPLETE: CPT | Performed by: INTERNAL MEDICINE

## 2022-09-06 PROCEDURE — 1036F TOBACCO NON-USER: CPT | Performed by: INTERNAL MEDICINE

## 2022-09-08 ENCOUNTER — TELEPHONE (OUTPATIENT)
Dept: INTERNAL MEDICINE CLINIC | Age: 87
End: 2022-09-08

## 2022-09-08 DIAGNOSIS — I49.3 PVC (PREMATURE VENTRICULAR CONTRACTION): Primary | ICD-10-CM

## 2022-09-08 NOTE — TELEPHONE ENCOUNTER
----- Message from Vineet Goodson MD sent at 9/7/2022  5:43 AM EDT -----  Tell pt labs ok  Done to eval cause of pvcs; would like him to consider holter and echo

## 2022-09-09 ENCOUNTER — NURSE ONLY (OUTPATIENT)
Dept: UROLOGY | Age: 87
End: 2022-09-09
Payer: COMMERCIAL

## 2022-09-09 DIAGNOSIS — R33.9 URINARY RETENTION: Primary | ICD-10-CM

## 2022-09-09 PROCEDURE — 51702 INSERT TEMP BLADDER CATH: CPT | Performed by: NURSE PRACTITIONER

## 2022-09-09 PROCEDURE — 99999 PR OFFICE/OUTPT VISIT,PROCEDURE ONLY: CPT | Performed by: NURSE PRACTITIONER

## 2022-09-09 NOTE — PROGRESS NOTES
Patient has given me verbal consent to perform catheter change Yes    Does patient have latex allergy? No  Does patient have shellfish or betadine allergy? No      Following Bekah Chaves CNP plan of care. 16 Fr Catheter changed without difficulty. Once balloon was deflated, removed catheter without difficulty. Cleansed urethral opening with betadine swab. 16 Fr regular ortega was inserted using sterile water-soluble lubricant without difficulty. Catheter was flushed with 35cc water ensuring return and inflated balloon with 10 ml of water. Ortega Catheter was hooked up to leg bag with straps. Foreskin reduced back down? No    Patient instructed on how to drain catheter bags. If patient was given a leg bag, patient was instructed to keep bag above the knee to prevent pulling on catheter. Pt notified if catheter is pulled on may cause pain and bleeding. Patient was also instructed on how to switch from leg bag to overnight bag. Supplies were provided by patient    Bard order given?  No

## 2022-09-15 ENCOUNTER — HOSPITAL ENCOUNTER (OUTPATIENT)
Dept: NON INVASIVE DIAGNOSTICS | Age: 87
Discharge: HOME | End: 2022-09-15
Payer: COMMERCIAL

## 2022-09-15 DIAGNOSIS — I49.3 PVC (PREMATURE VENTRICULAR CONTRACTION): ICD-10-CM

## 2022-09-15 LAB
LV EF: 58 %
LVEF MODALITY: NORMAL

## 2022-09-15 PROCEDURE — 93226 XTRNL ECG REC<48 HR SCAN A/R: CPT

## 2022-09-15 PROCEDURE — 93225 XTRNL ECG REC<48 HRS REC: CPT

## 2022-09-15 PROCEDURE — 93306 TTE W/DOPPLER COMPLETE: CPT

## 2022-09-15 NOTE — PROCEDURES
The skin was prepped and a  24 hour holter monitor was applied. The patient was instructed on the documentation of symptoms and the purpose of the holter as well as the things to avoid while wearing the holter. The patient was instructed to remove and return the holter on 09/16/2022.   The serial number of the holter that was applied is [id number]

## 2022-09-20 ENCOUNTER — TELEPHONE (OUTPATIENT)
Dept: INTERNAL MEDICINE CLINIC | Age: 87
End: 2022-09-20

## 2022-09-20 NOTE — TELEPHONE ENCOUNTER
----- Message from Jay Jay Torres MD sent at 9/16/2022  5:08 AM EDT -----  Tell him looks normal; normal strength and valves ok

## 2022-09-20 NOTE — TELEPHONE ENCOUNTER
Pt notified that echo looks normal, normal strength of the heart and valves are OK. If any questions, please contact the ofice.

## 2022-09-22 RX ORDER — SODIUM CHLORIDE 9 MG/ML
INJECTION, SOLUTION INTRAVENOUS
Qty: 3000 ML | Refills: 12 | Status: SHIPPED | OUTPATIENT
Start: 2022-09-22

## 2022-09-22 NOTE — TELEPHONE ENCOUNTER
Patient stated [facility] does not have the sterile water. Can you send it to [facility]. They have it in stock. Order pended. He has an appointment for catheter exchange scheduled on 10/07/2022. He has been irrigating more because it has been getting occluded at night.

## 2022-09-23 ENCOUNTER — TELEPHONE (OUTPATIENT)
Dept: INTERNAL MEDICINE CLINIC | Age: 87
End: 2022-09-23

## 2022-09-23 NOTE — TELEPHONE ENCOUNTER
Patient had catheter inserted 9/9 for retention. Woke up this morning with bladder pressure and constant feeling of needing to urinate. States he has had good output from catheter so he doesn't feel like catheter is obstructed. Urine is cloudy, light yellow. He had 10-12 amoxicillin at home, took one about an hour ago. He is pushing fluids. Patient called back at 11:44 to update. He did irrigate catheter and got a lot of urine back with relief of pressure. No blood clots noted. Spoke with Dr. Mac Glover, recommends regular irrigation of catheter. Relayed information to patient. Advised to call with any questions or concerns.

## 2022-09-26 LAB
ACQUISITION DURATION: NORMAL S
AVERAGE HEART RATE: 73 BPM
HOOKUP DATE: NORMAL
HOOKUP TIME: NORMAL
MAX HEART RATE TIME/DATE: NORMAL
MAX HEART RATE: 99 BPM
MIN HEART RATE TIME/DATE: NORMAL
MIN HEART RATE: 57 BPM
NUMBER OF QRS COMPLEXES: NORMAL
NUMBER OF SUPRAVENTRICULAR COUPLETS: 1
NUMBER OF SUPRAVENTRICULAR ECTOPICS: 1048
NUMBER OF SUPRAVENTRICULAR ISOLATED BEATS: 1018
NUMBER OF VENTRICULAR BIGEMINAL CYCLES: 198
NUMBER OF VENTRICULAR COUPLETS: 26
NUMBER OF VENTRICULAR ECTOPICS: 8214

## 2022-09-27 ENCOUNTER — TELEPHONE (OUTPATIENT)
Dept: INTERNAL MEDICINE CLINIC | Age: 87
End: 2022-09-27

## 2022-09-27 NOTE — TELEPHONE ENCOUNTER
Patient informed of Echo results. Patient will consider a chemical stress test if Dr. Emma Seay thinks it is warranted. Patient not scheduled to be seen until March. Will schedule earlier to discuss stress test if Dr. Emma Seay thinks it needs to be done sooner.

## 2022-09-27 NOTE — TELEPHONE ENCOUNTER
----- Message from Gayla Padilla MD sent at 9/27/2022  9:00 AM EDT -----  Tell pt echo looks good, normal strength; he has frequent extra beats from top and bottom of heart; would like him to consider a (chemical) stress test to evaluate circulation of heart.

## 2022-09-28 RX ORDER — LISINOPRIL 10 MG/1
TABLET ORAL
Qty: 180 TABLET | Refills: 3 | Status: SHIPPED | OUTPATIENT
Start: 2022-09-28

## 2022-09-28 RX ORDER — AMLODIPINE BESYLATE 10 MG/1
TABLET ORAL
Qty: 90 TABLET | Refills: 3 | Status: SHIPPED | OUTPATIENT
Start: 2022-09-28

## 2022-10-03 ENCOUNTER — OFFICE VISIT (OUTPATIENT)
Dept: INTERNAL MEDICINE CLINIC | Age: 87
End: 2022-10-03
Payer: COMMERCIAL

## 2022-10-03 VITALS
HEIGHT: 72 IN | HEART RATE: 72 BPM | SYSTOLIC BLOOD PRESSURE: 144 MMHG | WEIGHT: 170.8 LBS | TEMPERATURE: 97.4 F | BODY MASS INDEX: 23.13 KG/M2 | DIASTOLIC BLOOD PRESSURE: 80 MMHG

## 2022-10-03 DIAGNOSIS — I49.3 PVC (PREMATURE VENTRICULAR CONTRACTION): Primary | ICD-10-CM

## 2022-10-03 DIAGNOSIS — Z97.8 FOLEY CATHETER IN PLACE: ICD-10-CM

## 2022-10-03 DIAGNOSIS — I10 ESSENTIAL HYPERTENSION: ICD-10-CM

## 2022-10-03 PROCEDURE — 99212 OFFICE O/P EST SF 10 MIN: CPT | Performed by: INTERNAL MEDICINE

## 2022-10-03 PROCEDURE — 1123F ACP DISCUSS/DSCN MKR DOCD: CPT | Performed by: INTERNAL MEDICINE

## 2022-10-03 NOTE — PROGRESS NOTES
Jesse Sheldon (:  1935) is a 80 y.o. male,Established patient, here for evaluation of the following chief complaint(s): Other (Pvcs-discuss having a stress test)         ASSESSMENT/PLAN:  1. PVC (premature ventricular contraction)- note on last visit; holter shows frequent pvc and pac; echo nl; no fh cad. Walks 2 mi. No chpn. Plan:  -     Stress test, lexiscan- if negative beta blocker if pos; discussed poss cath  2. Essential hypertension- checks at home; ave 130-140/70-80  3. Ortega catheter in place- now daily irrigations; no further obstruction; pt advised if has to go to ER for obstruction, do not start antibiotic; call office      Return in about 6 months (around 4/3/2023). Subjective   SUBJECTIVE/OBJECTIVE:  HPI pt with hbp, chronic ortega, seen in f/u to discuss stress test.  Discussed mechanics of test and rationale for doing. He is agreeable. No sxs to suggest angina. Fa  in 80s. He has chronic ortega, with daily irrigation no further obstruction. Review of Systems   Twelve point ROS completed and found to be negative except as noted above.       Objective   Physical Exam   BP (!) 144/80 (Site: Left Upper Arm)   Pulse 72   Temp 97.4 °F (36.3 °C)   Ht 6' 0.01\" (1.829 m)   Wt 170 lb 12.8 oz (77.5 kg)   BMI 23.16 kg/m²     General appearance - alert, well appearing, and in no distress    Mental Status - alert, oriented to person, place, and time          Neck - supple, no significant adenopathy        Chest - clear to auscultation, no wheezes, rales or rhonchi, symmetric air entry     Heart - no murmurs noted, no gallops noted, no JVD, frequent extrasystoles     Abdomen - soft, nontender, nondistended, no masses or organomegaly         Neurological - alert, oriented, normal speech, no focal findings or movement disorder noted     Extremities - peripheral pulses normal, no pedal edema, no clubbing or cyanosis     Skin - normal coloration and turgor, no rashes, no suspicious skin lesions noted            An electronic signature was used to authenticate this note.     --Ivonne Fierro MD

## 2022-10-04 ENCOUNTER — NURSE ONLY (OUTPATIENT)
Dept: UROLOGY | Age: 87
End: 2022-10-04
Payer: MEDICARE

## 2022-10-04 DIAGNOSIS — R33.9 URINARY RETENTION: Primary | ICD-10-CM

## 2022-10-04 PROCEDURE — 51702 INSERT TEMP BLADDER CATH: CPT

## 2022-10-04 NOTE — PROGRESS NOTES
Patient has given me verbal consent to perform catheter change Yes    Does patient have latex allergy? No  Does patient have shellfish or betadine allergy? No      Following Neftali Beal PA-C plan of care. 16 Fr Catheter changed without difficulty. Once balloon was deflated, removed catheter without difficulty. Cleansed urethral opening with betadine swab. 16 Fr regular ortega was inserted using sterile water-soluble lubricant without difficulty. Catheter was flushed with 35cc water ensuring return and inflated balloon with 10 ml of water. Ortega Catheter was hooked up to leg bag with straps. Foreskin reduced back down? No    Patient instructed on how to drain catheter bags. If patient was given a leg bag, patient was instructed to keep bag above the knee to prevent pulling on catheter. Pt notified if catheter is pulled on may cause pain and bleeding. Patient was also instructed on how to switch from leg bag to overnight bag. Supplies were provided by patient    Bard order given? No    Assisted by Reed Perez CMA. Pt had a return of 550cc of urine. Catheter had thick sticky discharge at the end of the catheter. He stated he noticed some last night but that was the first he seen it. Pt stated he only had a little bit of sterile water to flush with at home. Gave pt 2 bottles of sterile water to flush with as needed.

## 2022-10-07 NOTE — H&P
Hospitalist History and Physical          Patient: Colt Pearson  : 1935  MRN: [id number]     Acct: [de-identified]    PCP: Ángel Boykin MD  Date of Admission: 10/13/2021  Date of Service: Pt seen/examined on 10/14/21  and Admitted to Inpatient with expected LOS greater than two midnights due to medical therapy. Hospital Problems         Last Modified POA    Hematuria 10/13/2021 Yes          Assessment and Plan:    1. Severe hematuria:  History of prostate cancer status post seeding and radiation therapy 10 years ago. Follows with urology Dr. Kisha Zavala. Hold aspirin. Continuous bladder irrigation overnight. Urology consultation. 2.  Urinary retention:  Secondary to severe hematuria with blood clots. Required chronic Garduno catheter since 10/8/2021. Continue Flomax. Continue plan as above in #1.    3.  Recent Proteus mirabilis UTI:  Urine culture on 10/7 with Proteus growth. Sensitive to Augmentin and ceftriaxone. Hold oral Augmentin and give IV ceftriaxone. 4. Hypertension:  Resume home dose lisinopril and Norvasc. 5.  Insomnia:  As needed trazodone. 6.  Fluids/electrolytes/nutrition:  Saline lock IV fluids. Follow-up borderline hypovolemic hyponatremia. Regular diet.        =======================================================================      Chief Complaint: Hematuria    History Of Present Illness:  Colt Pearson is a 80 y.o. male with PMHx of prostate cancer 10 years ago status post radiotherapy and seeding, hypertension, hyperlipidemia, recent UTI, and urinary retention, who presented to the emergency room here at [facility] sent from the urology clinic after patient developed severe hematuria at home with blood clots causing urinary retention. Recently diagnosed with Proteus UTI last Friday and placed on Augmentin. Reports hematuria got worse after Augmentin per patient's report.   Apparently started with severe hematuria this last Monday and escalated to the point where he had obstruction and unable to void. Came today to urology clinic and sent to ER for further evaluation and started on CBI's. Also last Friday went to urology clinic Dr. Dylon Guardado with inability to void and Garduno catheter placed. In the emergency room vital signs stable, afebrile, and laboratory vesication is within acceptable range. Patient had hematuria and CBI was started. Patient received 1 dose of ceftriaxone. At the time of my evaluation patient was stable. No acute distress. Past Medical History:        Diagnosis Date    Hyperlipidemia     Hypertension     Prostate cancer [facility])        Past Surgical History:        Procedure Laterality Date    COLONOSCOPY      HEMORRHOID SURGERY  1970    HERNIA REPAIR      [place] COLONOSCOPY FLX DX W/COLLJ SPEC WHEN PFRMD Left 6/11/2018    COLONOSCOPY performed by Gabriel Gonzalez MD at [place] [address]  2005    seed implant    TONSILLECTOMY         Medications Prior to Admission:   Prior to Admission medications    Medication Sig Start Date End Date Taking? Authorizing Provider   tamsulosin (FLOMAX) 0.4 MG capsule Take 1 capsule by mouth daily 10/13/21  Yes MARIKA Torres CNP   Acetaminophen (TYLENOL EXTRA STRENGTH PO) Take by mouth   Yes Historical Provider, MD   lidocaine (XYLOCAINE) 2 % jelly Apply topically as needed. 10/11/21  Yes MARIKA Torres CNP   traZODone (DESYREL) 50 MG tablet 25 to 50 mg.  Nightly  prn sleep 10/11/21  Yes Catia Starks MD   amoxicillin-clavulanate (AUGMENTIN) 875-125 MG per tablet Take 1 tablet by mouth 2 times daily for 10 days 10/10/21 10/20/21 Yes MARIKA Landeros CNP   potassium chloride (KLOR-CON M) 20 MEQ extended release tablet Take 1 tablet by mouth daily 6/15/21  Yes Catia Starks MD   Calcium Carb-Cholecalciferol (CALCIUM 1000 + D PO) Take 1 tablet by mouth daily   Yes Historical Provider, MD   amLODIPine (NORVASC) 10 MG tablet TAKE 1 TABLET EVERY DAY 3/5/21  Yes Catia Starks MD   lisinopril (PRINIVIL;ZESTRIL) 10 MG tablet TAKE 1 TABLET TWICE DAILY 3/5/21  Yes Catia Starks MD   aspirin 81 MG EC tablet Take 81 mg by mouth daily   Yes Historical Provider, MD   Apoaequorin (PREVAGEN) 10 MG CAPS Take 10 mg by mouth daily   Yes Historical Provider, MD   MULTIPLE VITAMIN PO Take by mouth daily   Yes Historical Provider, MD   UNABLE TO FIND Kefir 8 0z daily    Historical Provider, MD       Allergies:  Patient has no known allergies. Social History:    The patient currently lives with wife. .   Tobacco use:   reports that he quit smoking about 37 years ago. He has never used smokeless tobacco.  Alcohol use:   reports no history of alcohol use. Drug use:  reports no history of drug use. Family History:  as follows:      Problem Relation Age of Onset    Diabetes Father        Review of Systems:   Pertinent positives and negatives as noted in the HPI. All other systems reviewed and negative. Physical Exam:    BP (!) 150/73   Pulse 84   Temp 99.2 °F (37.3 °C) (Oral)   Resp 18   Ht 6' (1.829 m)   Wt 169 lb 12.8 oz (77 kg)   SpO2 95%   BMI 23.03 kg/m²       General appearance: No apparent distress, well developed, appears stated age. Eyes:  Pupils equal, round, and reactive to light. Conjunctivae/corneas clear. HENT: Head normal in appearance. External nares normal.  Oral mucosa moist without lesions. Hearing grossly intact. Neck: Supple, with full range of motion. Trachea midline. No gross JVD appreciated. Respiratory:  Normal respiratory effort. Clear to auscultation, bilaterally without rales or wheezes or rhonchi. Cardiovascular: Normal rate, regular rhythm with normal S1/S2 without murmurs. No lower extremity edema. Abdomen: Soft, non-tender, non-distended with normal bowel sounds. Musculoskeletal: There is no joint swelling or tenderness. Normal tone. No abnormal movements. Skin: Warm and dry.  No rashes or lesions. Neurologic:  No focal sensory/motor deficits in the upper and lower extremities. Cranial nerves:  grossly non-focal 2-12. Psychiatric: Alert and oriented, normal insight and thought content. Capillary Refill: Brisk,< 3 seconds. Peripheral Pulses: +2 palpable, equal bilaterally. Labs:     Recent Labs     10/13/21  1905   WBC 9.6   HGB 12.6*   HCT 37.3*   *     Recent Labs     10/13/21  1905   *   K 4.1   CL 92*   CO2 23   BUN 17   CREATININE 1.0   CALCIUM 9.2     No results for input(s): AST, ALT, BILIDIR, BILITOT, ALKPHOS in the last 72 hours. No results for input(s): INR in the last 72 hours. No results for input(s): Eladia Kaska in the last 72 hours. Lab Results   Component Value Date    NITRU Negative 10/01/2021    BLOODU Large 10/01/2021    BLOODU NEGATIVE 12/21/2020    SPECGRAV 1.015 01/13/2016    GLUCOSEU Negative 10/01/2021         Radiology:     CT UROGRAM    (Results Pending)          PT/OT Eval Status:  will be assessed  Diet: ADULT DIET; Regular  DVT prophylaxis: SCDs only. Code Status: Full Code  Disposition: admit to inpatient [facility]    Thank you Isis Dasilva MD for the opportunity to be involved in this patient's care.     Electronically signed by Sweta Stoddard MD on 10/14/2021 at 3:51 AM. Oxybutynin Pregnancy And Lactation Text: This medication is Pregnancy Category B and is considered safe during pregnancy. It is unknown if it is excreted in breast milk.

## 2022-10-10 ENCOUNTER — TELEPHONE (OUTPATIENT)
Dept: INTERNAL MEDICINE CLINIC | Age: 87
End: 2022-10-10

## 2022-10-12 RX ORDER — TAMSULOSIN HYDROCHLORIDE 0.4 MG/1
0.4 CAPSULE ORAL 2 TIMES DAILY
Qty: 60 CAPSULE | Refills: 1 | Status: SHIPPED | OUTPATIENT
Start: 2022-10-12 | End: 2022-12-16

## 2022-10-12 NOTE — TELEPHONE ENCOUNTER
Prescription sent. Catheter placed but remains on Flomax. Follow-up on 11/10/22 with Dr. Lindsey Hinton.

## 2022-10-12 NOTE — TELEPHONE ENCOUNTER
Vasu Frederickson called requesting a refill on the following medications:  Requested Prescriptions     Pending Prescriptions Disp Refills    tamsulosin (FLOMAX) 0.4 MG capsule 60 capsule 1     Sig: Take 1 capsule by mouth 2 times daily     Pharmacy verified: [facility] on [facility]  .pv      Date of last visit: 9/09/2022  Date of next visit (if applicable): 26/68/3530

## 2022-10-13 ENCOUNTER — HOSPITAL ENCOUNTER (OUTPATIENT)
Dept: NON INVASIVE DIAGNOSTICS | Age: 87
Discharge: HOME | End: 2022-10-13
Payer: MEDICARE

## 2022-10-13 ENCOUNTER — NURSE ONLY (OUTPATIENT)
Dept: LAB | Age: 87
End: 2022-10-13

## 2022-10-13 DIAGNOSIS — Z85.46 HISTORY OF PROSTATE CANCER: ICD-10-CM

## 2022-10-13 LAB — PROSTATE SPECIFIC ANTIGEN: < 0.02 NG/ML (ref 0–1)

## 2022-10-13 PROCEDURE — 78452 HT MUSCLE IMAGE SPECT MULT: CPT

## 2022-10-13 PROCEDURE — A9500 TC99M SESTAMIBI: HCPCS | Performed by: NUCLEAR MEDICINE

## 2022-10-13 PROCEDURE — 6360000002 HC RX W HCPCS

## 2022-10-13 PROCEDURE — 93017 CV STRESS TEST TRACING ONLY: CPT | Performed by: NUCLEAR MEDICINE

## 2022-10-13 PROCEDURE — 3430000000 HC RX DIAGNOSTIC RADIOPHARMACEUTICAL: Performed by: NUCLEAR MEDICINE

## 2022-10-13 RX ADMIN — Medication 31.5 MILLICURIE: at 08:20

## 2022-10-13 RX ADMIN — Medication 9 MILLICURIE: at 07:20

## 2022-10-17 ENCOUNTER — TELEPHONE (OUTPATIENT)
Dept: INTERNAL MEDICINE CLINIC | Age: 87
End: 2022-10-17

## 2022-10-17 NOTE — TELEPHONE ENCOUNTER
Pt was notified the Stress test is normal and there does not appear to be any significant blockages. I advised him Dr. Chica Guajardo would like him to go on a low dose BB Lopressor 25 mg. Bid. To reduce the frequency of the extra beats. I advised him it suppresses the effect of adrenaline on the heart. Pt in agreement to medication. He will call in BP and pulse after first dose.   Script sent to [employer] for [employer]

## 2022-10-19 ENCOUNTER — TELEPHONE (OUTPATIENT)
Dept: INTERNAL MEDICINE CLINIC | Age: 87
End: 2022-10-19

## 2022-10-19 NOTE — TELEPHONE ENCOUNTER
Pt started metoprolol tartrate 25 mg.  bidThis am.  BP before meds:  166/82  and pulse is 73    BP about 1 hr after meds at 144/77 pulse is 58

## 2022-10-23 ENCOUNTER — HOSPITAL ENCOUNTER (EMERGENCY)
Age: 87
Discharge: HOME | End: 2022-10-23
Attending: EMERGENCY MEDICINE
Payer: MEDICARE

## 2022-10-23 VITALS
SYSTOLIC BLOOD PRESSURE: 149 MMHG | DIASTOLIC BLOOD PRESSURE: 74 MMHG | TEMPERATURE: 97.7 F | HEART RATE: 84 BPM | HEIGHT: 72 IN | RESPIRATION RATE: 18 BRPM | WEIGHT: 165 LBS | BODY MASS INDEX: 22.35 KG/M2 | OXYGEN SATURATION: 98 %

## 2022-10-23 DIAGNOSIS — T83.518A CATHETER CYSTITIS, INITIAL ENCOUNTER (HCC): ICD-10-CM

## 2022-10-23 DIAGNOSIS — Z46.6 CATHETER (URINE) CHANGE REQUIRED: Primary | ICD-10-CM

## 2022-10-23 DIAGNOSIS — N30.90 CATHETER CYSTITIS, INITIAL ENCOUNTER (HCC): ICD-10-CM

## 2022-10-23 LAB
BACTERIA: ABNORMAL
BILIRUBIN URINE: NEGATIVE
BLOOD, URINE: ABNORMAL
CASTS: ABNORMAL /LPF
CASTS: ABNORMAL /LPF
CHARACTER, URINE: ABNORMAL
COLOR: YELLOW
CRYSTALS: ABNORMAL
EPITHELIAL CELLS, UA: ABNORMAL /HPF
GLUCOSE, URINE: NEGATIVE MG/DL
KETONES, URINE: NEGATIVE
LEUKOCYTE ESTERASE, URINE: ABNORMAL
MISCELLANEOUS LAB TEST RESULT: ABNORMAL
NITRITE, URINE: NEGATIVE
PH UA: 6 (ref 5–9)
PROTEIN UA: 30 MG/DL
RBC URINE: ABNORMAL /HPF
RENAL EPITHELIAL, UA: ABNORMAL
SPECIFIC GRAVITY UA: 1.01 (ref 1–1.03)
UROBILINOGEN, URINE: 0.2 EU/DL (ref 0–1)
WBC UA: > 200 /HPF
YEAST: ABNORMAL

## 2022-10-23 PROCEDURE — 81001 URINALYSIS AUTO W/SCOPE: CPT

## 2022-10-23 PROCEDURE — 87086 URINE CULTURE/COLONY COUNT: CPT

## 2022-10-23 PROCEDURE — 51798 US URINE CAPACITY MEASURE: CPT

## 2022-10-23 PROCEDURE — 99283 EMERGENCY DEPT VISIT LOW MDM: CPT

## 2022-10-23 PROCEDURE — 87077 CULTURE AEROBIC IDENTIFY: CPT

## 2022-10-23 PROCEDURE — 87186 SC STD MICRODIL/AGAR DIL: CPT

## 2022-10-23 RX ORDER — AMOXICILLIN AND CLAVULANATE POTASSIUM 875; 125 MG/1; MG/1
1 TABLET, FILM COATED ORAL 2 TIMES DAILY
Qty: 20 TABLET | Refills: 0 | Status: SHIPPED | OUTPATIENT
Start: 2022-10-23 | End: 2022-11-02

## 2022-10-23 RX ORDER — CIPROFLOXACIN 500 MG/1
500 TABLET, FILM COATED ORAL 2 TIMES DAILY
Qty: 20 TABLET | Refills: 0 | Status: SHIPPED | OUTPATIENT
Start: 2022-10-23 | End: 2022-11-02

## 2022-10-23 ASSESSMENT — PAIN DESCRIPTION - ONSET: ONSET: ON-GOING

## 2022-10-23 ASSESSMENT — PAIN DESCRIPTION - FREQUENCY: FREQUENCY: CONTINUOUS

## 2022-10-23 ASSESSMENT — PAIN SCALES - GENERAL: PAINLEVEL_OUTOF10: 10

## 2022-10-23 ASSESSMENT — PAIN - FUNCTIONAL ASSESSMENT: PAIN_FUNCTIONAL_ASSESSMENT: 0-10

## 2022-10-23 NOTE — ED PROVIDER NOTES
[address] [facility]  EMERGENCY MEDICINE ATTENDING ATTESTATION      Evaluation of Priscila Melgar. Case discussed and care plan developed with resident physician. I agree with the resident physician documentation and plan as documented by him, except if my documentation differs. Patient seen, interviewed and examined by me. I reviewed the medical, surgical, family and social history, medications and allergies. I have reviewed the nursing documentation. I have reviewed the patient's vital signs and are abnormal from hypertension  per my interpretation. Body mass index is 22.38 kg/m². Pulsoxymetry is normal per my interpretation. Brief H&P   Patient c/o not obtaining any urine in his Garduno catheter. Patient self irrigates daily basis on his Chronic Garduno catheter. States that because of supply shortages he has not been able to get additional bottles of sterile water for irrigation so lately he has been irrigating his Garduno catheter with air. Physical exam is notable for well appearing, uncomfortable from pain in the lower abdomen prior to Garduno exchange. After exchange, feeling more comfortable. Nurse reported when he changed the Garduno catheter Bag significant amount of air. Medical Decision Making   MDM:   Occluded Garduno catheter  Plan: Garduno catheter exchange  UA  Observation in the ED while awaiting results    Please see the resident physician completed note for final disposition except as documented on this attestation. I have reviewed and interpreted all available lab, radiology and ekg results available at the moment. Diagnosis, treatment and disposition plans were discussed and agreed upon by patient. This transcription was electronically signed. It was dictated by use of voice recognition software and electronically transcribed. The transcription may contain errors not detected in proofreading.      I performed direct supervision and was present for the critical portion following procedures: None  Critical care time on this case: None    Electronically signed by Lori Amezcua MD on 10/23/22 at 1:26 AM EDT        Lori Amezcua MD  10/23/22 [id number]

## 2022-10-23 NOTE — ED PROVIDER NOTES
Transfer of Care Note:   I have personally performed a face to face diagnostic evaluation on this patient. The patient's initial evaluation and plan have been discussed with the prior provider who initially evaluated the patient. Nursing Notes, Past Medical Hx, Past Surgical Hx, Social Hx, Allergies, and Family Hx were all reviewed. (Please note that portions of this note were completed with a voice recognition program.  Efforts were made to edit the dictations but occasionally words are mis-transcribed.)    2:09 AM EDT: The patient was evaluated. Parth Azul is a 80 y.o. male who presents to the Emergency Department for the evaluation of Garduno exchange due to malfunction, patient had been trying to flush with air at home. RADIOLOGY: non-plain film images(s) such as CT, Ultrasound and MRI are read by the radiologist.  No orders to display       ED LABS:  Labs Reviewed   URINALYSIS WITH MICROSCOPIC - Abnormal; Notable for the following components:       Result Value    Blood, Urine LARGE (*)     Protein, UA 30 (*)     Leukocyte Esterase, Urine LARGE (*)     Character, Urine TURBID (*)     All other components within normal limits   CULTURE, URINE       MDM:  Awaiting UA for dispo. Dr. Chhaya Iverson present when UA results arrived, started patient on abx based on previous cultures and patient will follow up. FINAL IMPRESSION      1. Catheter (urine) change required    2. Catheter cystitis, initial encounter [facility])        Care of this patient was transferred from Dr. Gege Carrillo to myself at shift change. Provider:  I personally performed the services described in the documentation, reviewed and edited the documentation which was dictated in my presence, and it accurately records my words and actions.     Amado Woo MD 10/23/22 6:25 AM       Ant Green MD  10/23/22 9842

## 2022-10-23 NOTE — DISCHARGE INSTRUCTIONS
You were seen for malfunctioning Garduno catheter. You were found to have infection of your urine. Please take antibiotics as prescribed. Please return to the ED if any worsening of condition.   Please follow-up with your urologist.

## 2022-10-23 NOTE — ED PROVIDER NOTES
[place] ENCOUNTER          Pt Name: Joe De La Garza  MRN: [id number]  [place] 1935  Date of evaluation: 10/23/2022  Treating Resident Physician: Jorje Gates MD  Supervising Physician: Tanesha       Chief Complaint   Patient presents with    Other     Unable to irrigate catheter     History obtained from the patient. HISTORY OF PRESENT ILLNESS    HPI  Joe De La Garza is a 80 y.o. male who presents to the emergency department for evaluation of catheter problem. Patient has chronic Garduno after prostate radiation typically he irrigates his Garduno once weekly with sterile water. Has been out of sterile water, has been inflated air. Today noticed that he could not have any output from his catheter. Denies any back pain, nausea, vomiting. Pain 10 out of 10, lower abdominal, nonradiating, described as pressure. The patient has no other acute complaints at this time. REVIEW OF SYSTEMS   Review of Systems   Constitutional: Negative. HENT: Negative. Eyes:  Negative for photophobia and visual disturbance. Respiratory: Negative. Negative for choking and shortness of breath. Cardiovascular:  Negative for chest pain, palpitations and leg swelling. Gastrointestinal:  Positive for abdominal distention and abdominal pain. Negative for constipation, diarrhea, nausea and vomiting. Endocrine: Negative for polyphagia and polyuria. Genitourinary:  Positive for difficulty urinating. Negative for dysuria, frequency, hematuria and urgency. Musculoskeletal:  Negative for arthralgias, back pain and myalgias. Skin:  Negative for color change and pallor. Neurological:  Negative for dizziness and numbness.         PAST MEDICAL AND SURGICAL HISTORY     Past Medical History:   Diagnosis Date    Hyperlipidemia     Hypertension     Prostate cancer [facility])      Past Surgical History:   Procedure Laterality Date COLONOSCOPY      CYSTOSCOPY Left 10/14/2021    CYSTOSCOPY EVACUATION OF CLOTS, EVACUATION OF BLADDER STONE, CHANNEL TURP  performed by Mary Beth Luu MD at [address] [place] [address] [place] FLX DX W/PAOLO Wagner 1978 PFRMD Left 6/11/2018    COLONOSCOPY performed by Chary Awad MD at [address] [facility]  2005    seed implant    TONSILLECTOMY           MEDICATIONS   No current facility-administered medications for this encounter.     Current Outpatient Medications:     amoxicillin-clavulanate (AUGMENTIN) 875-125 MG per tablet, Take 1 tablet by mouth 2 times daily for 10 days, Disp: 20 tablet, Rfl: 0    ciprofloxacin (CIPRO) 500 MG tablet, Take 1 tablet by mouth 2 times daily for 10 days, Disp: 20 tablet, Rfl: 0    metoprolol tartrate (LOPRESSOR) 25 MG tablet, Take 1 tablet by mouth 2 times daily, Disp: 60 tablet, Rfl: 5    tamsulosin (FLOMAX) 0.4 MG capsule, Take 1 capsule by mouth 2 times daily, Disp: 60 capsule, Rfl: 1    lisinopril (PRINIVIL;ZESTRIL) 10 MG tablet, TAKE 1 TABLET TWICE DAILY, Disp: 180 tablet, Rfl: 3    amLODIPine (NORVASC) 10 MG tablet, TAKE 1 TABLET EVERY DAY, Disp: 90 tablet, Rfl: 3    sodium chloride 0.9 % infusion, Flush ortega catheter as needed with 60-100ml of saline as needed, Disp: 3000 mL, Rfl: 12    mirtazapine (REMERON) 30 MG tablet, Take 1 tablet by mouth nightly, Disp: 30 tablet, Rfl: 5    Elastic Bandages & Supports (B & B HERNIA BELT) [facility], Brand per patient preference, Disp: 1 each, Rfl: 1    Melatonin 10 MG TABS, Take by mouth at bedtime, Disp: , Rfl:     Acetaminophen (TYLENOL EXTRA STRENGTH PO), Take by mouth prn, Disp: , Rfl:     Apoaequorin (PREVAGEN) 10 MG CAPS, Take 10 mg by mouth daily, Disp: , Rfl:     MULTIPLE VITAMIN PO, Take by mouth daily, Disp: , Rfl:       SOCIAL HISTORY     Social History     Social History Narrative    Not on file     Social History     Tobacco Use    Smoking status: Former     Types: Cigarettes     Quit date: 26     Years since quittin.8    Smokeless tobacco: Never   Vaping Use    Vaping Use: Never used   Substance Use Topics    Alcohol use: No    Drug use: No         ALLERGIES   No Known Allergies      FAMILY HISTORY     Family History   Problem Relation Age of Onset    Diabetes Father          PREVIOUS RECORDS   Previous records reviewed: Medical, past surgical, medications, allergies        PHYSICAL EXAM     ED Triage Vitals [10/23/22 0045]   BP Temp Temp Source Heart Rate Resp SpO2 Height Weight   (!) 176/83 97.7 °F (36.5 °C) Oral 84 18 97 % 6' (1.829 m) 165 lb (74.8 kg)     Initial vital signs and nursing assessment reviewed and normal. Body mass index is 22.38 kg/m². Pulsoximetry is normal per my interpretation. Additional Vital Signs:  Vitals:    10/23/22 0128   BP: (!) 149/74   Pulse:    Resp:    Temp:    SpO2: 98%       Physical Exam  Constitutional:       General: He is not in acute distress. Appearance: Normal appearance. He is not ill-appearing or toxic-appearing. HENT:      Head: Normocephalic and atraumatic. Right Ear: External ear normal.      Left Ear: External ear normal.      Nose: Nose normal.      Mouth/Throat:      Mouth: Mucous membranes are moist.      Pharynx: Oropharynx is clear. Eyes:      Extraocular Movements: Extraocular movements intact. Conjunctiva/sclera: Conjunctivae normal.      Pupils: Pupils are equal, round, and reactive to light. Cardiovascular:      Rate and Rhythm: Normal rate and regular rhythm. Pulses: Normal pulses. Heart sounds: Normal heart sounds. No murmur heard. No gallop. Pulmonary:      Effort: Pulmonary effort is normal. No respiratory distress. Breath sounds: Normal breath sounds. No wheezing or rales. Chest:      Chest wall: No tenderness. Abdominal:      General: Abdomen is flat. Bowel sounds are normal. There is no distension. Palpations: Abdomen is soft. Tenderness:  There is no abdominal tenderness. There is no guarding or rebound. Musculoskeletal:      Cervical back: Normal range of motion and neck supple. Right lower leg: No edema. Left lower leg: No edema. Skin:     General: Skin is warm. Capillary Refill: Capillary refill takes less than 2 seconds. Neurological:      General: No focal deficit present. Mental Status: He is alert and oriented to person, place, and time. Motor: No weakness. MEDICAL DECISION MAKING   Initial Assessment: This is an 71-year-old male, past medical history of indwelling urinary catheter, presenting with clogged Garduno. Physical exam significant for well-appearing male no acute distress, no lower abdominal tenderness or CVA tenderness. Exam was performed after Garduno had been switched. Apparently with Garduno switch, there was 300 cc of fluid, as well as a decent amount of air that came out of Garduno. Patient has complete resolution of his symptoms. Plan:   UA shows infection. According to patient's previous cultures, would be advisable to use Cipro and Augmentin  Discharged home with strict return precautions, follow-up. ED RESULTS   Laboratory results:  Labs Reviewed   URINALYSIS WITH MICROSCOPIC - Abnormal; Notable for the following components:       Result Value    Blood, Urine LARGE (*)     Protein, UA 30 (*)     Leukocyte Esterase, Urine LARGE (*)     Character, Urine TURBID (*)     All other components within normal limits   CULTURE, URINE       Radiologic studies results:  No orders to display       ED Medications administered this visit: Medications - No data to display      ED COURSE         Strict return precautions and follow up instructions were discussed with the patient prior to discharge, with which the patient agrees.       MEDICATION CHANGES     Discharge Medication List as of 10/23/2022  2:38 AM        START taking these medications    Details   amoxicillin-clavulanate (AUGMENTIN) 875-125 MG per tablet Take 1 tablet by mouth 2 times daily for 10 days, Disp-20 tablet, R-0Normal      ciprofloxacin (CIPRO) 500 MG tablet Take 1 tablet by mouth 2 times daily for 10 days, Disp-20 tablet, R-0Normal               FINAL DISPOSITION     Final diagnoses:   Catheter (urine) change required   Catheter cystitis, initial encounter [facility])     Condition: condition: good  Dispo: Discharge to home      This transcription was electronically signed. Parts of this transcriptions may have been dictated by use of voice recognition software and electronically transcribed, and parts may have been transcribed with the assistance of an ED scribe. The transcription may contain errors not detected in proofreading. Please refer to my supervising physician's documentation if my documentation differs.     Electronically Signed: Caden Rangel MD, 10/24/22, 1:24 AM          Caden Rangel MD  Resident  10/24/22 9527

## 2022-10-23 NOTE — ED TRIAGE NOTES
Pt presents to ED for evaluation of inability to drain urinary catheter. Pt states he had catheter placed approx. 1 year ago and has been self irrigating at home intermittently. Pt hx of prostate cancer. Last drained catheter around 2000 on 10/22. Pt states constant pressure at this time, rating pain 10/10. Denies CP or SOB. Vitals obtained.

## 2022-10-24 ENCOUNTER — TELEPHONE (OUTPATIENT)
Dept: INTERNAL MEDICINE CLINIC | Age: 87
End: 2022-10-24

## 2022-10-24 ASSESSMENT — ENCOUNTER SYMPTOMS
ABDOMINAL PAIN: 1
RESPIRATORY NEGATIVE: 1
BACK PAIN: 0
VOMITING: 0
DIARRHEA: 0
CHOKING: 0
CONSTIPATION: 0
NAUSEA: 0
COLOR CHANGE: 0
SHORTNESS OF BREATH: 0
PHOTOPHOBIA: 0
ABDOMINAL DISTENTION: 1

## 2022-10-24 NOTE — TELEPHONE ENCOUNTER
Pt called in saying he was in the ER Saturday night for a clogged catheter and they tested his urine. They told him he has a couple of infections. The first doc that saw him, pt told that doc that Dr Chica Guajardo doesn't really want him taking antibiotics and the ER doc said he thinks the same way. However a different doc discharged him and she prescribed augmentin and cipro x 10 days and she says these are not \"heavy hitters\"    Pt denies burning, back pain or fever. He has not started them and is wondering what Dr. Raymon Jain recommendation is?

## 2022-10-25 LAB
ORGANISM: ABNORMAL
URINE CULTURE, ROUTINE: ABNORMAL

## 2022-10-25 NOTE — TELEPHONE ENCOUNTER
Pts wife was notified pt should not take the antibiotics. Pts wife is wondering if he never takes the antibiotics how does he get rid of the infection that clogs up the ortega? She says pt just has not been feeling good in general and they are wondering if this is the cause?

## 2022-10-26 NOTE — TELEPHONE ENCOUNTER
Pt and wife were notified and they verbalize understanding. There is a problem with getting the type of water that he needs. Pharmacies are not getting it in. He will contact the urology office and see what they suggest.  I advised him they may know of a different place to the supplies from.

## 2022-10-26 NOTE — PROGRESS NOTES
Pharmacy Note  ED Culture Follow-up    Rylie Bailey is a 80 y.o. male. Allergies: Patient has no known allergies. Current antimicrobials:   Reviewed patient's urine culture - culture positive for Proteus mirabilis. Patient was discharged on Cipro and Augmentin, and culture is sensitive to prescribed medication. Antibiotic prescribed at discharge is appropriate - no changes made to antibiotic regimen. Please call with any questions.  [address] [place] [facility] [place], PharmD 2:23 PM 10/26/2022

## 2022-11-14 RX ORDER — MIRTAZAPINE 30 MG/1
30 TABLET, FILM COATED ORAL NIGHTLY
Qty: 30 TABLET | Refills: 5 | Status: SHIPPED | OUTPATIENT
Start: 2022-11-14

## 2022-11-15 ENCOUNTER — OFFICE VISIT (OUTPATIENT)
Dept: UROLOGY | Age: 87
End: 2022-11-15
Payer: MEDICARE

## 2022-11-15 VITALS — HEIGHT: 72 IN | RESPIRATION RATE: 16 BRPM | BODY MASS INDEX: 22.35 KG/M2 | WEIGHT: 165 LBS

## 2022-11-15 DIAGNOSIS — Z85.46 HISTORY OF PROSTATE CANCER: ICD-10-CM

## 2022-11-15 DIAGNOSIS — R33.9 URINARY RETENTION: Primary | ICD-10-CM

## 2022-11-15 DIAGNOSIS — Z92.3 HISTORY OF BRACHYTHERAPY: ICD-10-CM

## 2022-11-15 DIAGNOSIS — N35.916 STRICTURE OF OVERLAPPING SITES OF URETHRA IN MALE, UNSPECIFIED STRICTURE TYPE: ICD-10-CM

## 2022-11-15 DIAGNOSIS — N32.0 BLADDER NECK CONTRACTURE: ICD-10-CM

## 2022-11-15 DIAGNOSIS — N39.0 RECURRENT UTI: ICD-10-CM

## 2022-11-15 PROCEDURE — 1123F ACP DISCUSS/DSCN MKR DOCD: CPT | Performed by: UROLOGY

## 2022-11-15 PROCEDURE — 1036F TOBACCO NON-USER: CPT | Performed by: UROLOGY

## 2022-11-15 PROCEDURE — 99214 OFFICE O/P EST MOD 30 MIN: CPT | Performed by: UROLOGY

## 2022-11-15 PROCEDURE — G8484 FLU IMMUNIZE NO ADMIN: HCPCS | Performed by: UROLOGY

## 2022-11-15 PROCEDURE — G8420 CALC BMI NORM PARAMETERS: HCPCS | Performed by: UROLOGY

## 2022-11-15 PROCEDURE — G8427 DOCREV CUR MEDS BY ELIG CLIN: HCPCS | Performed by: UROLOGY

## 2022-11-15 NOTE — PROGRESS NOTES
Patient has given me verbal consent to perform catheter change Yes    Does patient have latex allergy? No  Does patient have shellfish or betadine allergy? No      Following Dr. Tonio Kingsley plan of care. 16 Fr Catheter changed without difficulty. Once balloon was deflated, removed catheter without difficulty. Cleansed urethral opening with betadine swab. 16 Fr regular ortega was inserted using sterile water-soluble lubricant without difficulty. Catheter was flushed with 35cc water ensuring return and inflated balloon with 10 ml of water. Ortega Catheter was hooked up to leg bag with straps. Foreskin reduced back down? No    Patient instructed on how to drain catheter bags. If patient was given a leg bag, patient was instructed to keep bag above the knee to prevent pulling on catheter. Pt notified if catheter is pulled on may cause pain and bleeding. Patient was also instructed on how to switch from leg bag to overnight bag. Supplies were provided by office    Bard order given?  No

## 2022-11-15 NOTE — PROGRESS NOTES
JUN Saeed MD        [address] [place] [address] [address]  Dept: [phone]  Dept Fax: [phone]: [address] [address] Urology Office Note -     Patient:  Mirian Denny  YOB: 1935  Date: 11/15/2022    The patient is a 80 y.o. male who presents today for evaluation of the following problems:   Chief Complaint   Patient presents with    Follow-up    Urinary Retention    referred/consultation requested by Marcio Qureshi MD.    HISTORY OF PRESENT ILLNESS:       Bulbar urethral stricture  Failed voiding trials   Has indwelling catheter- irrigated the catheter himself to prevent mucus and clots  Stricture has been dilated in the past  Hx of brachytherapy    Gross hematuria  No new hematuria  Doing well    Prostate cancer  Hx of brachytherapy  Psa reviewed      Requested/reviewed records from Marcio Qureshi MD office and/or outside [de-identified]    (Patient's old records have been requested, reviewed and pertinent findings summarized in today's note.)    Procedures Today: N/A      Last several PSA's:  Lab Results   Component Value Date    PSA <0.02 10/13/2022    PSA <0.02 05/08/2015       Last total testosterone:  No results found for: TESTOSTERONE    Urinalysis today:  No results found for this visit on 11/15/22. Last BUN and creatinine:  Lab Results   Component Value Date    BUN 15 06/16/2022     Lab Results   Component Value Date    CREATININE 0.9 06/16/2022         Imaging Reviewed during this Office Visit:   Brock Glover MD independently reviewed the images and verified the radiology reports from:    DEXA BONE DENSITY AXIAL SKELETON    Result Date: 3/12/2021  EXAM: DEXA BONE DENSITY AXIAL SKELETON HISTORY: 80 years, Male, Localized osteoporosis without current pathological fracture COMPARISON: 12/26/2018. FINDINGS: Bone densitometry has been performed using a Hologic bone densitometer. The routine evaluation includes the lumbar spine and both hips. Evaluation of the L1, L3, L4 of the lumbar spine demonstrates an average bone mineral density measurement of 0.984g/cm2 which corresponds to a T-score of -1.0 and a Z-score of 0.3. Previously average bone mineral density measurement of 1.022 g/cm2 which corresponds to a T-score of -0.6 and a Z-score of 0.6. This qualifies as normal bone mineral density. The calculated bone density in the left femoral neck is 0.629 g/cm2 which corresponds to a T-score of  -2.2 and a Z-score of -0.5. Previously average bone mineral density measurement of 0.611 g/cm2 which corresponds to a T-score of -2.3 and a Z-score of -0.7. This qualifies as osteopenia. The calculated bone density in the right femoral neck is 0.689 g/cm2 which corresponds to a T-score of  minus 1. and a Z-score of -0.1. Previously average bone mineral density measurement of 0.659 g/cm2 which corresponds to a T-score of -2.0 and a Z-score of -0.3. This qualifies as osteopenia. BMD changes versus previous is -3.7% in the spine. BMD change versus previous is 3.9% for the hips using the mean. This patient is osteopenic using the World Health Organization criteria. The patient is at a medium risk for fracture. 10 year probability of major osteoporotic fracture: 21% 10 year probability of hip fracture: 16% The patient's meets criteria for referral to a bone fragility clinic. **This report has been created using voice recognition software. It may contain minor errors which are inherent in voice recognition technology. ** Final report electronically signed by Dr. Sole Martinez on 3/12/2021 1:32 PM      PAST MEDICAL, FAMILY AND SOCIAL HISTORY:  Past Medical History:   Diagnosis Date    Hyperlipidemia     Hypertension     Prostate cancer [facility])      Past Surgical History:   Procedure Laterality Date    COLONOSCOPY      CYSTOSCOPY Left 10/14/2021    CYSTOSCOPY EVACUATION OF CLOTS, EVACUATION OF BLADDER STONE, CHANNEL TURP  performed by Abram Florez MD at [address] [place] [address] [place] FLX DX W/COLLJ Wagner 1978 PFRMD Left 2018    COLONOSCOPY performed by Niecy Robison MD at [facility]      seed implant    TONSILLECTOMY       Family History   Problem Relation Age of Onset    Diabetes Father      No outpatient medications have been marked as taking for the 11/15/22 encounter (Office Visit) with Tobin Lucio MD.       Patient has no known allergies. Social History     Tobacco Use   Smoking Status Former    Types: Cigarettes    Quit date:     Years since quittin.9   Smokeless Tobacco Never      (If patient a smoker, smoking cessation counseling offered)   Social History     Substance and Sexual Activity   Alcohol Use No       REVIEW OF SYSTEMS:  Constitutional: negative  Eyes: negative  Respiratory: negative  Cardiovascular: negative  Gastrointestinal: negative  Genitourinary: see HPI  Musculoskeletal: negative  Skin: negative   Neurological: negative  Hematological/Lymphatic: negative  Psychological: negative        Physical Exam:    This a 80 y.o. male  Vitals:    11/15/22 1306   Resp: 16     Body mass index is 22.38 kg/m². Constitutional: Patient in no acute distress;         Assessment and Plan        1. Urinary retention [R33.9 (ICD-10-CM)]    2. Recurrent UTI    3. Stricture of overlapping sites of urethra in male, unspecified stricture type    4. History of prostate cancer    5. History of brachytherapy    6. Bladder neck contracture               Plan:         Bulbar stricture- stable. secondary to radiation. Has been dilated in past. Gets every three week catheter changes and catheter does get plugged frequently but improved with the flushing. Needs irrigation supplies for irrigation nightly. Gross hematuria- secondary to radiation. Did have recent ED visit and hematuria. Discussed causes.  This has resolved  Recurrent uti- stable. treat symptomatic infections. Hx of prostate cancer- s/p brachytherapy. Catheter change every three weeks. Needs irrigation supplies for daily irrigation  Did offer gen surg referral for poss new hernia on right side--not bothering pt so he will hold off  F/u 6 months          Prescriptions Ordered:  No orders of the defined types were placed in this encounter. Orders Placed:  No orders of the defined types were placed in this encounter.            Allen Long MD

## 2022-11-23 ENCOUNTER — HOSPITAL ENCOUNTER (EMERGENCY)
Age: 87
Discharge: HOME | End: 2022-11-23
Attending: STUDENT IN AN ORGANIZED HEALTH CARE EDUCATION/TRAINING PROGRAM
Payer: MEDICARE

## 2022-11-23 VITALS
HEART RATE: 86 BPM | TEMPERATURE: 98.2 F | BODY MASS INDEX: 24.41 KG/M2 | WEIGHT: 180 LBS | RESPIRATION RATE: 17 BRPM | DIASTOLIC BLOOD PRESSURE: 76 MMHG | SYSTOLIC BLOOD PRESSURE: 158 MMHG | OXYGEN SATURATION: 98 %

## 2022-11-23 DIAGNOSIS — T83.9XXA PROBLEM WITH FOLEY CATHETER, INITIAL ENCOUNTER (HCC): Primary | ICD-10-CM

## 2022-11-23 PROCEDURE — 99283 EMERGENCY DEPT VISIT LOW MDM: CPT

## 2022-11-23 PROCEDURE — 51702 INSERT TEMP BLADDER CATH: CPT

## 2022-11-23 NOTE — ED NOTES
This RN attempted to irrigate ortega. Unsuccessful at this time. Dr. Doug Garcia made aware.  Verbal to replace ortega catheter     Junior Amado RN  11/23/22 [id number]

## 2022-11-23 NOTE — ED PROVIDER NOTES
[address] [facility] [address] [address] EMERGENCY DEPT  EMERGENCY DEPARTMENT     Pt Name: Angeles Ledezma  MRN: [id number]  [place] 1935  Date of evaluation: 11/23/2022  Provider: Vandana Thakkar MD,     [address] [place] [address]       Chief Complaint   Patient presents with    Urinary Catheter     Plugged       HISTORY OF PRESENT ILLNESS    Angeles Ledezma is a 80 y.o. male who presents to the emergency department from home with chief complaint of Garduno catheter obstruction. He has a longstanding Garduno catheter in place following urethral strictures in the setting of prostate cancer. He reports he had diminished output today and attempted to flush the catheter however reports there is a blockage and he is unable to flush the catheter. He denies abdominal pain, nausea, vomiting. He denies suprapubic pain. He denies fevers. He reports the last urine output was around 2 to 3 hours prior to ED arrival.      Triage notes and Nursing notes were reviewed by myself. Any discrepancies are addressed above.     PAST MEDICAL HISTORY     Past Medical History:   Diagnosis Date    Hyperlipidemia     Hypertension     Prostate cancer [facility])        SURGICAL HISTORY       Past Surgical History:   Procedure Laterality Date    COLONOSCOPY      CYSTOSCOPY Left 10/14/2021    CYSTOSCOPY EVACUATION OF CLOTS, EVACUATION OF BLADDER STONE, CHANNEL TURP  performed by Maria A Aleman MD at [address] [place] [address] [place] FLX DX W/PAOLO Edward 1978 PFRMD Left 6/11/2018    COLONOSCOPY performed by Santana Olivo MD at [address] [place] [address]  2005    seed implant    TONSILLECTOMY         CURRENT MEDICATIONS       Discharge Medication List as of 11/23/2022  2:28 AM        CONTINUE these medications which have NOT CHANGED    Details   mirtazapine (REMERON) 30 MG tablet Take 1 tablet by mouth nightly, Disp-30 tablet, R-5Normal      metoprolol tartrate (LOPRESSOR) 25 MG tablet Take 1 tablet by mouth 2 times daily, Disp-60 tablet, R-5Normal      tamsulosin (FLOMAX) 0.4 MG capsule Take 1 capsule by mouth 2 times daily, Disp-60 capsule, R-1Normal      lisinopril (PRINIVIL;ZESTRIL) 10 MG tablet TAKE 1 TABLET TWICE DAILY, Disp-180 tablet, R-3Normal      amLODIPine (NORVASC) 10 MG tablet TAKE 1 TABLET EVERY DAY, Disp-90 tablet, R-3Normal      sodium chloride 0.9 % infusion Flush ortega catheter as needed with 60-100ml of saline as needed, Disp-3000 mL, R-12Normal      Elastic Bandages & Supports (B & B HERNIA BELT) MISC Disp-1 each, R-1, PrintBrand per patient preference      Melatonin 10 MG TABS Take by mouth at bedtimeHistorical Med      Acetaminophen (TYLENOL EXTRA STRENGTH PO) Take by mouth prnHistorical Med      Apoaequorin (PREVAGEN) 10 MG CAPS Take 10 mg by mouth dailyHistorical Med      MULTIPLE VITAMIN PO Take by mouth dailyHistorical Med             ALLERGIES     Patient has no known allergies. FAMILY HISTORY       Family History   Problem Relation Age of Onset    Diabetes Father         SOCIAL HISTORY       Social History     Socioeconomic History    Marital status:    Tobacco Use    Smoking status: Former     Types: Cigarettes     Quit date:      Years since quittin.9    Smokeless tobacco: Never   Vaping Use    Vaping Use: Never used   Substance and Sexual Activity    Alcohol use: No    Drug use: No       REVIEW OF SYSTEMS     Review of Systems  - CONSTITUTIONAL: Denies weight loss, fever and chills. - HEENT: Denies changes in vision and hearing.    -   RESPIRATORY: Denies SOB and cough. - CV: Denies palpitations and CP.       - GI: Denies abdominal pain, nausea, vomiting and diarrhea.      - : As per HPI      - MSK: Denies myalgia and joint pain. - SKIN: Denies rash and pruritus.    -   NEUROLOGICAL: Denies headache and syncope. - PSYCHIATRIC: Denies recent changes in mood. Denies anxiety and depression.   Except as noted above the remainder of the review of systems was reviewed and is. PHYSICAL EXAM    (up to 7 for level 4, 8 or more for level 5)     ED Triage Vitals [11/23/22 0110]   BP Temp Temp Source Heart Rate Resp SpO2 Height Weight   (!) 187/93 98.2 °F (36.8 °C) Oral 86 17 95 % -- 180 lb (81.6 kg)       Physical Exam  Constitutional:       Appearance: Normal appearance. HENT:      Head: Normocephalic. Nose: Nose normal.   Eyes:      Conjunctiva/sclera: Conjunctivae normal.   Cardiovascular:      Rate and Rhythm: Normal rate and regular rhythm. Pulmonary:      Effort: Pulmonary effort is normal.   Abdominal:      General: Abdomen is flat. There is no distension. Palpations: Abdomen is soft. There is no mass. Tenderness: There is no abdominal tenderness. There is no right CVA tenderness, left CVA tenderness, guarding or rebound. Hernia: No hernia is present. Genitourinary:     Penis: Normal.       Testes: Normal.   Skin:     General: Skin is warm and dry. Neurological:      General: No focal deficit present. Mental Status: He is alert. DIAGNOSTIC RESULTS     EKG:(none if blank)  All EKG's are interpreted by [facility] Phys[facility] who either signs or Co-signs this chart in the absence of a cardiologist.        RADIOLOGY: (none if blank)   Interpretation per the Radiologistbelow, if available at the time of this note:    No orders to display       LABS:  Labs Reviewed - No data to display    All other labs were within normal range or not returned as of this dictation. Please note, any cultures that may have been sent were not resulted at the time of this patient visit. EMERGENCY DEPARTMENT COURSE andMedical Decision Making:     MDM  /   Point-of-care ultrasound reveals around 300 cc of urine in the bladder. We attempted to flush his Garduno catheter however unsuccessful. The Garduno catheter was exchanged for  Without difficulty. Around 300 cc of output was obtained.   The urine was clear and the patient has no signs or symptoms of urinary tract infection and has only had an obstructed catheter for around 2 hours hence there is no concern for UTI. Patient will be discharged home with urology follow-up. Strict returnprecautions and follow up instructions were discussed with the patient with which the patient agrees    ED Medications administered this visit:  Medications - No data to display      Procedures: (None if blank)       CLINICAL       1. Problem with Garduno catheter, initial encounter [facility])          DISPOSITION/PLAN   DISPOSITION Decision To Discharge 11/23/2022 02:17:17 AM      PATIENT REFERRED TO:  Ramona Thorpe MD  [place] [address]  [address] [place] [id number] [phone]    In 3 days          (Please note that portions of this note were completed with a voice recognition program.  Efforts were made to edit the dictations but occasionallywords are mis-transcribed. )      Richard Trinidad MD, (electronically signed)  Attending Physician, Emergency Department         Richard Trinidad MD  11/23/22 0973

## 2022-11-23 NOTE — ED TRIAGE NOTES
Patient presents to ED with chief complaint of plugged catheter. Patient states he irrigates it himself, but he could not get it to flush this time. Patient resting in bed. Respirations easy and unlabored. No distress noted. Call light within reach.

## 2022-12-09 ENCOUNTER — NURSE ONLY (OUTPATIENT)
Dept: UROLOGY | Age: 87
End: 2022-12-09
Payer: MEDICARE

## 2022-12-09 DIAGNOSIS — R33.9 URINARY RETENTION: Primary | ICD-10-CM

## 2022-12-09 PROCEDURE — 51702 INSERT TEMP BLADDER CATH: CPT | Performed by: NURSE PRACTITIONER

## 2022-12-09 RX ORDER — MAGNESIUM HYDROXIDE 1200 MG/15ML
LIQUID ORAL
Qty: 3000 ML | Refills: 11 | Status: SHIPPED | OUTPATIENT
Start: 2022-12-09

## 2022-12-09 NOTE — PROGRESS NOTES
Patient has given me verbal consent to perform catheter change Yes    Does patient have latex allergy? No  Does patient have shellfish or betadine allergy? No      Following Agorafy APRN  plan of care. 18 Fr Catheter changed without difficulty. Once balloon was deflated, removed catheter without difficulty. Cleansed urethral opening with betadine swab. 18 Fr regular ortega was inserted using sterile water-soluble lubricant without difficulty. Catheter was flushed with 70cc water ensuring return and inflated balloon with 10 ml of water. Ortega Catheter was hooked up to leg bag with straps. Foreskin reduced back down? Yes    Patient instructed on how to drain catheter bags. If patient was given a leg bag, patient was instructed to keep bag above the knee to prevent pulling on catheter. Pt notified if catheter is pulled on may cause pain and bleeding. Patient was also instructed on how to switch from leg bag to overnight bag. Supplies were provided by patient    Pt c/o ortega plugging up. Has been in ER due to ortega catheter not draining even though he is irrigating at home with sodium chloride. Per pt request I placed a 18 fr instead of a 16 fr to help alleviate ortega plugging up. Pt also c/o pharmacy not being able to find sodium chloride for his irrigations. Per Amie she printed script for sterile water instead. Pt will call pharmacies to see if he can find somewhere that has sterile water. Pt was advised due to increasing size of ortega today he may see some blood in urine. Will push fluids.

## 2022-12-16 RX ORDER — TAMSULOSIN HYDROCHLORIDE 0.4 MG/1
CAPSULE ORAL
Qty: 60 CAPSULE | Refills: 1 | Status: SHIPPED | OUTPATIENT
Start: 2022-12-16 | End: 2022-12-17 | Stop reason: SDUPTHER

## 2022-12-16 NOTE — TELEPHONE ENCOUNTER
[facility] called requesting a refill on the following medications:  Requested Prescriptions     Pending Prescriptions Disp Refills    tamsulosin (FLOMAX) 0.4 MG capsule [Pharmacy Med Name: TAMSULOSIN HCL 0.4 MG CAPSULE] 60 capsule 1     Sig: take 1 capsule by mouth twice a day     Pharmacy verified:  [email or web address]      Date of last visit: 11/15/2022  Date of next visit (if applicable): 88/98/8587

## 2022-12-17 RX ORDER — TAMSULOSIN HYDROCHLORIDE 0.4 MG/1
CAPSULE ORAL
Qty: 60 CAPSULE | Refills: 1 | Status: SHIPPED | OUTPATIENT
Start: 2022-12-17

## 2022-12-22 ENCOUNTER — TELEPHONE (OUTPATIENT)
Dept: UROLOGY | Age: 87
End: 2022-12-22

## 2022-12-22 NOTE — TELEPHONE ENCOUNTER
Eloisa from [facility] called wanting to make sure it was ok for her to send out 18 fr instead of the 16 fr that he was receiving. I did ok this per the notes of his last cath change, he was up sized to a 18 fr at that time.

## 2022-12-25 PROCEDURE — 51702 INSERT TEMP BLADDER CATH: CPT

## 2022-12-25 PROCEDURE — 99283 EMERGENCY DEPT VISIT LOW MDM: CPT

## 2022-12-25 PROCEDURE — 51798 US URINE CAPACITY MEASURE: CPT

## 2022-12-26 ENCOUNTER — HOSPITAL ENCOUNTER (EMERGENCY)
Age: 87
Discharge: HOME | End: 2022-12-26
Attending: EMERGENCY MEDICINE
Payer: MEDICARE

## 2022-12-26 VITALS
BODY MASS INDEX: 22.35 KG/M2 | OXYGEN SATURATION: 96 % | TEMPERATURE: 97.8 F | HEIGHT: 72 IN | SYSTOLIC BLOOD PRESSURE: 154 MMHG | RESPIRATION RATE: 18 BRPM | DIASTOLIC BLOOD PRESSURE: 78 MMHG | WEIGHT: 165 LBS | HEART RATE: 77 BPM

## 2022-12-26 DIAGNOSIS — R33.9 ACUTE ON CHRONIC URINARY RETENTION: Primary | ICD-10-CM

## 2022-12-26 DIAGNOSIS — T83.9XXA PROBLEM WITH FOLEY CATHETER, INITIAL ENCOUNTER (HCC): ICD-10-CM

## 2022-12-26 LAB
ALBUMIN SERPL-MCNC: 4.2 G/DL (ref 3.5–5.1)
ALP BLD-CCNC: 98 U/L (ref 38–126)
ALT SERPL-CCNC: 17 U/L (ref 11–66)
ANION GAP SERPL CALCULATED.3IONS-SCNC: 12 MEQ/L (ref 8–16)
AST SERPL-CCNC: 23 U/L (ref 5–40)
BACTERIA: ABNORMAL /HPF
BASOPHILS # BLD: 0.5 %
BASOPHILS ABSOLUTE: 0.1 THOU/MM3 (ref 0–0.1)
BILIRUB SERPL-MCNC: 0.3 MG/DL (ref 0.3–1.2)
BILIRUBIN URINE: NEGATIVE
BLOOD, URINE: ABNORMAL
BUN BLDV-MCNC: 16 MG/DL (ref 7–22)
CALCIUM SERPL-MCNC: 9.5 MG/DL (ref 8.5–10.5)
CASTS 2: ABNORMAL /LPF
CASTS UA: ABNORMAL /LPF
CHARACTER, URINE: ABNORMAL
CHLORIDE BLD-SCNC: 102 MEQ/L (ref 98–111)
CO2: 26 MEQ/L (ref 23–33)
COLOR: YELLOW
CREAT SERPL-MCNC: 1 MG/DL (ref 0.4–1.2)
CRYSTALS, UA: ABNORMAL
EOSINOPHIL # BLD: 1.2 %
EOSINOPHILS ABSOLUTE: 0.1 THOU/MM3 (ref 0–0.4)
EPITHELIAL CELLS, UA: ABNORMAL /HPF
ERYTHROCYTE [DISTWIDTH] IN BLOOD BY AUTOMATED COUNT: 13.6 % (ref 11.5–14.5)
ERYTHROCYTE [DISTWIDTH] IN BLOOD BY AUTOMATED COUNT: 47.3 FL (ref 35–45)
GFR SERPL CREATININE-BSD FRML MDRD: > 60 ML/MIN/1.73M2
GLUCOSE BLD-MCNC: 143 MG/DL (ref 70–108)
GLUCOSE URINE: NEGATIVE MG/DL
HCT VFR BLD CALC: 40.7 % (ref 42–52)
HEMOGLOBIN: 14 GM/DL (ref 14–18)
IMMATURE GRANS (ABS): 0.06 THOU/MM3 (ref 0–0.07)
IMMATURE GRANULOCYTES: 0.5 %
INR BLD: 1.03 (ref 0.85–1.13)
KETONES, URINE: NEGATIVE
LEUKOCYTE ESTERASE, URINE: ABNORMAL
LYMPHOCYTES # BLD: 13.7 %
LYMPHOCYTES ABSOLUTE: 1.6 THOU/MM3 (ref 1–4.8)
MAGNESIUM: 2 MG/DL (ref 1.6–2.4)
MCH RBC QN AUTO: 32.5 PG (ref 26–33)
MCHC RBC AUTO-ENTMCNC: 34.4 GM/DL (ref 32.2–35.5)
MCV RBC AUTO: 94.4 FL (ref 80–94)
MISCELLANEOUS 2: ABNORMAL
MONOCYTES # BLD: 7.9 %
MONOCYTES ABSOLUTE: 0.9 THOU/MM3 (ref 0.4–1.3)
NITRITE, URINE: NEGATIVE
NUCLEATED RED BLOOD CELLS: 0 /100 WBC
PH UA: 7.5 (ref 5–9)
PLATELET # BLD: 330 THOU/MM3 (ref 130–400)
PMV BLD AUTO: 8.9 FL (ref 9.4–12.4)
POTASSIUM REFLEX MAGNESIUM: 3.5 MEQ/L (ref 3.5–5.2)
PROTEIN UA: 30
RBC # BLD: 4.31 MILL/MM3 (ref 4.7–6.1)
RBC URINE: ABNORMAL /HPF
RENAL EPITHELIAL, UA: ABNORMAL
SEG NEUTROPHILS: 76.2 %
SEGMENTED NEUTROPHILS ABSOLUTE COUNT: 8.9 THOU/MM3 (ref 1.8–7.7)
SODIUM BLD-SCNC: 140 MEQ/L (ref 135–145)
SPECIFIC GRAVITY, URINE: 1.01 (ref 1–1.03)
TOTAL PROTEIN: 6.6 G/DL (ref 6.1–8)
UROBILINOGEN, URINE: 0.2 EU/DL (ref 0–1)
WBC # BLD: 11.7 THOU/MM3 (ref 4.8–10.8)
WBC UA: > 200 /HPF
YEAST: ABNORMAL

## 2022-12-26 PROCEDURE — 83735 ASSAY OF MAGNESIUM: CPT

## 2022-12-26 PROCEDURE — 81001 URINALYSIS AUTO W/SCOPE: CPT

## 2022-12-26 PROCEDURE — 80053 COMPREHEN METABOLIC PANEL: CPT

## 2022-12-26 PROCEDURE — 85025 COMPLETE CBC W/AUTO DIFF WBC: CPT

## 2022-12-26 PROCEDURE — 36415 COLL VENOUS BLD VENIPUNCTURE: CPT

## 2022-12-26 PROCEDURE — 87086 URINE CULTURE/COLONY COUNT: CPT

## 2022-12-26 PROCEDURE — 85610 PROTHROMBIN TIME: CPT

## 2022-12-26 PROCEDURE — 87077 CULTURE AEROBIC IDENTIFY: CPT

## 2022-12-26 PROCEDURE — 87186 SC STD MICRODIL/AGAR DIL: CPT

## 2022-12-26 ASSESSMENT — ENCOUNTER SYMPTOMS
DIARRHEA: 0
ABDOMINAL PAIN: 1
SHORTNESS OF BREATH: 0
SINUS PRESSURE: 0
CHEST TIGHTNESS: 0
CONSTIPATION: 0
NAUSEA: 0
COUGH: 0
ABDOMINAL DISTENTION: 0
EYES NEGATIVE: 1
PHOTOPHOBIA: 0
RESPIRATORY NEGATIVE: 1
ALLERGIC/IMMUNOLOGIC NEGATIVE: 1
SINUS PAIN: 0
VOMITING: 0

## 2022-12-26 ASSESSMENT — PAIN DESCRIPTION - LOCATION: LOCATION: ABDOMEN

## 2022-12-26 ASSESSMENT — PAIN SCALES - GENERAL: PAINLEVEL_OUTOF10: 10

## 2022-12-26 ASSESSMENT — PAIN - FUNCTIONAL ASSESSMENT
PAIN_FUNCTIONAL_ASSESSMENT: NONE - DENIES PAIN
PAIN_FUNCTIONAL_ASSESSMENT: 0-10
PAIN_FUNCTIONAL_ASSESSMENT: NONE - DENIES PAIN

## 2022-12-26 NOTE — ED PROVIDER NOTES
[place] ENCOUNTER          Pt Name: Oxana Espinoza  MRN: [id number]  [place] 1935  Date of evaluation: 12/25/2022  Treating Resident Physician: Alayna Gamez MD  Supervising Physician: Dr. Melody Bradshaw    History obtained from the patient. CHIEF COMPLAINT       Chief Complaint   Patient presents with    Urinary Retention     Clogged catheter           HISTORY OF PRESENT ILLNESS    HPI  Oxana Espinoza is a 80 y.o. male who presents to the emergency department for evaluation of clogged Garduno catheter    Patient is presenting emergency department significant abdominal pain stating that his Garduno catheter is clogged. Patient says his Garduno has not drained anything since 10 PM.  Patient says abdominal pain is suprapubic and nonradiating. Patient describes his pain as his usual pain when he has these call catheters. Patient has a chronic indwelling catheter secondary to prostate resection with seeding from prostate cancer causing significant scar tissue and urinary retention. Patient says he gets his Garduno catheter swapped every 3 months but usually is not coming the emergency department because they feel before then. Patient is not having any other symptoms denies any fevers, chills, chest pain, shortness of breath, headedness, dizziness, numbness, weakness. The patient has no other acute complaints at this time. REVIEW OF SYSTEMS   Review of Systems   Constitutional: Negative. Negative for activity change, chills, fatigue and fever. HENT: Negative. Negative for sinus pressure and sinus pain. Eyes: Negative. Negative for photophobia and visual disturbance. Respiratory: Negative. Negative for cough, chest tightness and shortness of breath. Cardiovascular: Negative. Negative for chest pain and leg swelling. Gastrointestinal:  Positive for abdominal pain.  Negative for abdominal distention, constipation, diarrhea, nausea and vomiting. Endocrine: Negative. Genitourinary:  Positive for decreased urine volume. Negative for dysuria and hematuria. Musculoskeletal: Negative. Skin: Negative. Negative for rash and wound. Allergic/Immunologic: Negative. Neurological: Negative. Negative for dizziness, light-headedness, numbness and headaches. Hematological: Negative. Psychiatric/Behavioral: Negative. Negative for agitation and confusion. All other systems reviewed and are negative. PAST MEDICAL AND SURGICAL HISTORY     Past Medical History:   Diagnosis Date    Hyperlipidemia     Hypertension     Prostate cancer [facility])      Past Surgical History:   Procedure Laterality Date    COLONOSCOPY      CYSTOSCOPY Left 10/14/2021    CYSTOSCOPY EVACUATION OF CLOTS, EVACUATION OF BLADDER STONE, CHANNEL TURP  performed by Emily Zambrano MD at [address] [place] [address] [place] FLX DX W/COLLJ [place] 1978 PFRMD Left 6/11/2018    COLONOSCOPY performed by Arun Millan MD at [employer]  2005    seed implant    TONSILLECTOMY           MEDICATIONS   No current facility-administered medications for this encounter. Current Outpatient Medications:     tamsulosin (FLOMAX) 0.4 MG capsule, take 1 capsule by mouth twice a day, Disp: 60 capsule, Rfl: 1    Water For Irrigation, Sterile (STERILE WATER FOR IRRIGATION), Irrigate ortega catheter with  mls of sterile water as needed to maintain patency. , Disp: 3000 mL, Rfl: 11    mirtazapine (REMERON) 30 MG tablet, Take 1 tablet by mouth nightly, Disp: 30 tablet, Rfl: 5    metoprolol tartrate (LOPRESSOR) 25 MG tablet, Take 1 tablet by mouth 2 times daily, Disp: 60 tablet, Rfl: 5    lisinopril (PRINIVIL;ZESTRIL) 10 MG tablet, TAKE 1 TABLET TWICE DAILY, Disp: 180 tablet, Rfl: 3    amLODIPine (NORVASC) 10 MG tablet, TAKE 1 TABLET EVERY DAY, Disp: 90 tablet, Rfl: 3    sodium chloride 0.9 % infusion, Flush ortega catheter as needed with 60-100ml of saline as needed, Disp: 3000 mL, Rfl: 12    Elastic Bandages & Supports (B & B HERNIA BELT) [facility], Brand per patient preference, Disp: 1 each, Rfl: 1    Melatonin 10 MG TABS, Take by mouth at bedtime, Disp: , Rfl:     Acetaminophen (TYLENOL EXTRA STRENGTH PO), Take by mouth prn, Disp: , Rfl:     Apoaequorin (PREVAGEN) 10 MG CAPS, Take 10 mg by mouth daily, Disp: , Rfl:     MULTIPLE VITAMIN PO, Take by mouth daily, Disp: , Rfl:       SOCIAL HISTORY     Social History     Social History Narrative    Not on file     Social History     Tobacco Use    Smoking status: Former     Types: Cigarettes     Quit date:      Years since quittin.0    Smokeless tobacco: Never   Vaping Use    Vaping Use: Never used   Substance Use Topics    Alcohol use: No    Drug use: No         ALLERGIES   No Known Allergies      FAMILY HISTORY     Family History   Problem Relation Age of Onset    Diabetes Father          PREVIOUS RECORDS   Previous records reviewed:  Patient was last emerged her 2022 for problem with Garduno . PHYSICAL EXAM     ED Triage Vitals [22 0005]   BP Temp Temp Source Heart Rate Resp SpO2 Height Weight   -- 97.8 °F (36.6 °C) Oral 62 -- 92 % 6' (1.829 m) 165 lb (74.8 kg)     Initial vital signs and nursing assessment reviewed and normal. Body mass index is 22.38 kg/m². Pulsoximetry is normal per my interpretation. Additional Vital Signs:  Vitals:    22 0056   BP: (!) 158/67   Pulse: 84   Resp: 18   Temp:    SpO2: 95%       Physical Exam  Vitals and nursing note reviewed. Constitutional:       Appearance: He is normal weight. HENT:      Head: Normocephalic and atraumatic. Eyes:      Extraocular Movements: Extraocular movements intact. Pupils: Pupils are equal, round, and reactive to light. Cardiovascular:      Rate and Rhythm: Normal rate and regular rhythm. Pulses: Normal pulses. Heart sounds: Normal heart sounds.    Pulmonary: Effort: Pulmonary effort is normal.      Breath sounds: Normal breath sounds. Abdominal:      General: Bowel sounds are normal.      Palpations: Abdomen is soft. Tenderness: There is abdominal tenderness in the suprapubic area. Hernia: A hernia is present. Hernia is present in the right inguinal area. Comments: Garduno catheter in place, right inguinal hernia present, nontender, self reduced no evidence of an strangulation or incarceration   Musculoskeletal:         General: Normal range of motion. Skin:     General: Skin is warm and dry. Capillary Refill: Capillary refill takes less than 2 seconds. Neurological:      General: No focal deficit present. Mental Status: He is alert. MEDICAL DECISION MAKING   Initial Assessment:   26-year-old male presenting with failure of his Garduno catheter with suprapubic pain and decreased urinary output. Incidental right inguinal hernia found nontender without any color change to suggest regulation or incarceration. We will replace the Garduno catheter evaluate patient's CBC BMP and urinalysis. .  Plan:   CBC, BMP, urinalysis,, replace Garduno, post void bladder scan        ED RESULTS   Laboratory results:  Labs Reviewed   CBC WITH AUTO DIFFERENTIAL - Abnormal; Notable for the following components:       Result Value    WBC 11.7 (*)     RBC 4.31 (*)     Hematocrit 40.7 (*)     MCV 94.4 (*)     RDW-SD 47.3 (*)     MPV 8.9 (*)     Segs Absolute 8.9 (*)     All other components within normal limits   COMPREHENSIVE METABOLIC PANEL W/ REFLEX TO MG FOR LOW K - Abnormal; Notable for the following components:    Glucose 143 (*)     All other components within normal limits   URINE WITH REFLEXED MICRO - Abnormal; Notable for the following components:    Blood, Urine SMALL (*)     Protein, UA 30 (*)     Leukocyte Esterase, Urine LARGE (*)     Character, Urine CLOUDY (*)     All other components within normal limits   CULTURE, REFLEXED, URINE    Narrative: Source: urine, clean catch       Site:           Current Antibiotics: not stated   PROTIME-INR   ANION GAP   GLOMERULAR FILTRATION RATE, ESTIMATED   MAGNESIUM       Radiologic studies results:  No orders to display       ED Medications administered this visit: Medications - No data to display      ED COURSE     ED Course as of 12/26/22 0158   Mon Dec 26, 2022   0157 Patient's white blood cell count mildly elevated 11.7 this is likely reactive which from the indwelling Garduno. Patient does have a large white blood cell count burden however no bacteria seen on exam.  Patient is without any urinary symptoms or systemic symptoms. Patient reports significant improvement in his pain after replacing the Garduno. Post void residual volume was 13. Review of chart demonstrates urological recommendation across multiple months do not treat urinary tract infections unless the patient is symptomatic. As patient does not have any symptoms we are not prescribing antibiotics today. This was discussed at length with the patient and he agrees to follow-up with urology following this visit today. Shared decision-making was performed and patient agrees he is suitable for discharge at this time. [JAY]      ED Course User Index  [JAY] Teddy Concepcion MD       Strict return precautions and follow up instructions were discussed with the patient prior to discharge, with which the patient agrees. MEDICATION CHANGES     New Prescriptions    No medications on file         FINAL DISPOSITION     Final diagnoses:   Acute on chronic urinary retention   Problem with Garduno catheter, initial encounter ([facility] [address]     Condition: condition: stable  Dispo: Discharge to home      This transcription was electronically signed. Parts of this transcriptions may have been dictated by use of voice recognition software and electronically transcribed, and parts may have been transcribed with the assistance of an ED scribe.  The transcription may contain errors not detected in proofreading. Please refer to my supervising physician's documentation if my documentation differs.     Electronically Signed: Aman Avendaño MD, 12/26/22, 1:58 AM          Alonzo Rose MD  Resident  12/26/22 4329

## 2022-12-26 NOTE — ED NOTES
Bladder scan performed prior to removal of previously placed ortega catheter, 282 ml noted. Ortega removed, patient immediately expelled urine from penis. Patient states he had some relief at that time. New 18 g ortega placed per order. Urine returned. Patient tolerated without complaint.      Ya President, RN  12/26/22 7963

## 2022-12-26 NOTE — DISCHARGE INSTRUCTIONS
You reported significant improvement after your catheter was exchanged. Your urinalysis does demonstrate a large number of white blood cells but no bacteria. Because you not have any symptoms prior urology call evaluation says not to treat so we are not prescribing antibiotics. Please follow-up with your urologist following this visit today so they can see the results and follow-up with the culture. Please return the emergency room if you have any further concerns.

## 2022-12-26 NOTE — ED TRIAGE NOTES
Pt presents to the ED via triage with c/o urinary catheter issue and abdominal cramping. Pt states he has had issues with his catheter being \"plugged\" in the past and noticed around 2200 tonight that the bag is not longer draining. Pt states he usually empties the bag hourly. Pt states he is having cramping and discomfort in his lower abdomen.  Pt states he attempted to irrigate the catheter at home but was unsuccessful

## 2023-01-10 ENCOUNTER — TELEPHONE (OUTPATIENT)
Dept: INTERNAL MEDICINE CLINIC | Age: 88
End: 2023-01-10

## 2023-01-10 ENCOUNTER — HOSPITAL ENCOUNTER (EMERGENCY)
Age: 88
Discharge: HOME | End: 2023-01-10
Attending: STUDENT IN AN ORGANIZED HEALTH CARE EDUCATION/TRAINING PROGRAM
Payer: MEDICARE

## 2023-01-10 ENCOUNTER — TELEPHONE (OUTPATIENT)
Dept: UROLOGY | Age: 88
End: 2023-01-10

## 2023-01-10 VITALS
BODY MASS INDEX: 22.35 KG/M2 | OXYGEN SATURATION: 93 % | HEART RATE: 84 BPM | DIASTOLIC BLOOD PRESSURE: 82 MMHG | TEMPERATURE: 97.5 F | WEIGHT: 165 LBS | SYSTOLIC BLOOD PRESSURE: 168 MMHG | HEIGHT: 72 IN | RESPIRATION RATE: 16 BRPM

## 2023-01-10 DIAGNOSIS — T83.011A MALFUNCTION OF FOLEY CATHETER, INITIAL ENCOUNTER (HCC): Primary | ICD-10-CM

## 2023-01-10 PROCEDURE — 99282 EMERGENCY DEPT VISIT SF MDM: CPT

## 2023-01-10 PROCEDURE — 51702 INSERT TEMP BLADDER CATH: CPT

## 2023-01-10 ASSESSMENT — PAIN - FUNCTIONAL ASSESSMENT
PAIN_FUNCTIONAL_ASSESSMENT: 0-10
PAIN_FUNCTIONAL_ASSESSMENT: NONE - DENIES PAIN

## 2023-01-10 ASSESSMENT — PAIN DESCRIPTION - LOCATION: LOCATION: ABDOMEN

## 2023-01-10 ASSESSMENT — PAIN SCALES - GENERAL: PAINLEVEL_OUTOF10: 7

## 2023-01-10 NOTE — TELEPHONE ENCOUNTER
I contacted pt and he does irrigate at home. He stayed up late watching a movie and had trouble with irrigation. Nothing would go in. It was late and he decided to go in  He states he usually gets cather changes every 3 weeks and he is wondering if every 2 weeks wouldn't be better. He will discuss with Dr. Anastacio Roy.

## 2023-01-10 NOTE — ED PROVIDER NOTES
[address] [facility] [address] [address] EMERGENCY DEPT      EMERGENCY MEDICINE     Pt Name: Marie Eli  MRN: [id number]  [place] 1935  Date of evaluation: 1/10/2023  Provider: Winnie Hess MD    CHIEF COMPLAINT       Chief Complaint   Patient presents with    Urinary Retention     HISTORY OF PRESENT ILLNESS   Marie Eli is a pleasant 80 y.o. male who presents to the emergency department from from home, by private vehicle for evaluation of urinary retention. He has a chronic Garduno catheter in place secondary to urethral strictures following treatment of prostate cancer. He reports abdominal distention and decreased urine output earlier today. He attempted to irrigate the Garduno catheter however was unsuccessful and presents to the ED for evaluation. He reports slight suprapubic fullness but denies flank pain. He denies fever. He reports his urine prior to this was clear and without hematuria. He denies chest pain or shortness of breath. He reports he feels overall well.     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    Hematuria R31.9    Acute urinary retention R33.8    Acute cystitis with hematuria N30.01     SURGICAL HISTORY       Past Surgical History:   Procedure Laterality Date    COLONOSCOPY      CYSTOSCOPY Left 10/14/2021    CYSTOSCOPY EVACUATION OF CLOTS, EVACUATION OF BLADDER STONE, CHANNEL TURP  performed by Abram Florez MD at [address] [place] [address] [place] FLX DX W/PAOLO Edward 1978 PFRMD Left 6/11/2018    COLONOSCOPY performed by Niecy Robison MD at [employer]  2005    seed implant TONSILLECTOMY         CURRENT MEDICATIONS       Previous Medications    ACETAMINOPHEN (TYLENOL EXTRA STRENGTH PO)    Take by mouth prn    AMLODIPINE (NORVASC) 10 MG TABLET    TAKE 1 TABLET EVERY DAY    APOAEQUORIN (PREVAGEN) 10 MG CAPS    Take 10 mg by mouth daily    ELASTIC BANDAGES & SUPPORTS (B & B HERNIA BELT) MISC    Brand per patient preference    LISINOPRIL (PRINIVIL;ZESTRIL) 10 MG TABLET    TAKE 1 TABLET TWICE DAILY    MELATONIN 10 MG TABS    Take by mouth at bedtime    METOPROLOL TARTRATE (LOPRESSOR) 25 MG TABLET    Take 1 tablet by mouth 2 times daily    MIRTAZAPINE (REMERON) 30 MG TABLET    Take 1 tablet by mouth nightly    MULTIPLE VITAMIN PO    Take by mouth daily    SODIUM CHLORIDE 0.9 % INFUSION    Flush ortega catheter as needed with 60-100ml of saline as needed    TAMSULOSIN (FLOMAX) 0.4 MG CAPSULE    take 1 capsule by mouth twice a day    WATER FOR IRRIGATION, STERILE (STERILE WATER FOR IRRIGATION)    Irrigate ortega catheter with  mls of sterile water as needed to maintain patency. ALLERGIES     has No Known Allergies. FAMILY HISTORY     He indicated that his mother is . He indicated that his father is . SOCIAL HISTORY       Social History     Tobacco Use    Smoking status: Former     Types: Cigarettes     Quit date:      Years since quittin.0    Smokeless tobacco: Never   Vaping Use    Vaping Use: Never used   Substance Use Topics    Alcohol use: No    Drug use: No       PHYSICAL EXAM       ED Triage Vitals [01/10/23 0136]   BP Temp Temp Source Heart Rate Resp SpO2 Height Weight   (!) 178/92 97.5 °F (36.4 °C) Oral 84 16 99 % 6' (1.829 m) 165 lb (74.8 kg)       Additional Vital Signs:  Vitals:    01/10/23 0136   BP: (!) 178/92   Pulse: 84   Resp: 16   Temp: 97.5 °F (36.4 °C)   SpO2: 99%     Physical Exam  Constitutional:       Appearance: Normal appearance.    Eyes:      Conjunctiva/sclera: Conjunctivae normal.   Cardiovascular:      Rate and Rhythm: Normal rate and regular rhythm. Pulses: Normal pulses. Pulmonary:      Effort: Pulmonary effort is normal.   Abdominal:      General: Abdomen is flat. Palpations: Abdomen is soft. Comments: Suprapubic fullness   Genitourinary:     Penis: Normal.       Testes: Normal.   Skin:     General: Skin is warm. Capillary Refill: Capillary refill takes less than 2 seconds. Neurological:      Mental Status: He is alert. FORMAL DIAGNOSTIC RESULTS     RADIOLOGY: Interpretation per the Radiologist below, if available at the time of this note (none if blank): No orders to display       LABS: (none if blank)  Labs Reviewed - No data to display    (Any cultures that may have been sent were not resulted at the time of this patient visit)    [facility] / ED COURSE:     1) Number and Complexity of Problems            Problem List This Visit:         Chief Complaint   Patient presents with    Urinary Retention            Differential Diagnosis includes (but not limited to): Garduno catheter obstruction        Diagnoses Considered but I have low suspicion of:   N/A             Pertinent Comorbid Conditions:    Chronic Garduno catheter use    2)  Data Reviewed (none if left blank)                 Decision Rules/Clinical Scores utilized: None             See Formal Diagnostic Results above for the lab and radiology tests and orders. 3)  Treatment and Disposition         ED Reassessment: We attempted to irrigate the Garduno catheter however were unsuccessful. The Garduno catheter was successfully exchanged and the patient's bladder distention completely resolved. The urine is clear and without hematuria. There is no indication for urinalysis at this time as the retention. Was very brief, moderate of hours. The patient will follow-up with his outpatient physician and return to the emergency department if he is experience Garduno blockages again.          Case discussed with consulting clinician: None         Shared Decision-Making was performed and disposition discussed with the        Patient/Family and questions answered          Social determinants of health impacting treatment or disposition: None        Summary of Patient Presentation:      SUMI  /   Farrukh Sal Reviewed:    Vitals:    01/10/23 0136   BP: (!) 178/92   Pulse: 84   Resp: 16   Temp: 97.5 °F (36.4 °C)   TempSrc: Oral   SpO2: 99%   Weight: 165 lb (74.8 kg)   Height: 6' (1.829 m)       The patient was seen and examined. Appropriate diagnostic testing was performed and results reviewed with the patient. The results of pertinent diagnostic studies and exam findings were discussed. The patients provisional diagnosis and plan of care were discussed with the patient and present family who expressed understanding. Any medications were reviewed and indications and risks of medications were discussed with the patient /family present. Strict verbal and written return precautions, instructions and appropriate follow-up provided to  the patient. ED Medications administered this visit:  (None if blank)  Medications - No data to display      PROCEDURES: (None if blank)  Procedures:     CRITICAL CARE: (None if blank)      DISCHARGE PRESCRIPTIONS: (None if blank)  New Prescriptions    No medications on file       FINAL IMPRESSION      1.  Malfunction of Garduno catheter, initial encounter [facility])          DISPOSITION/PLAN   DISPOSITION    Discharge    OUTPATIENT FOLLOW UP THE PATIENT:  Vivi Ho MD  [facility] [address]  [address] [facility] [address]    In 3 days      MD Rudy Zavala MD  01/10/23 0903

## 2023-01-10 NOTE — ED NOTES
Pt here for urinary retention. Pt tried to irrigate his ortega at home and was not successful. Pt c/o mild abd discomfort.        [facility]  01/10/23 9850

## 2023-01-10 NOTE — TELEPHONE ENCOUNTER
----- Message from Butch Casillas MD sent at 1/10/2023  2:20 PM EST -----  Pt in er again with ortega obstruction; does he irrigate at home?  ----- Message -----  From: Elinor Meredith MD  Sent: 1/10/2023   3:26 AM EST  To: Butch Casillas MD

## 2023-01-10 NOTE — TELEPHONE ENCOUNTER
Patient wanted to know if he should have the catheter exchanges more frequently or if he can be taught to do catheter exchanges at home instead of going to the ER. He is irrigating daily. He did have hematuria after the new catheter was inserted. He pushed fluids and the urine is clear now. He comes for exchanges every 3 weeks but ends up in the ER before the 3 weeks is up. He denies any symptoms of infection.

## 2023-01-11 ENCOUNTER — OFFICE VISIT (OUTPATIENT)
Dept: UROLOGY | Age: 88
End: 2023-01-11
Payer: MEDICARE

## 2023-01-11 DIAGNOSIS — R31.0 GROSS HEMATURIA: ICD-10-CM

## 2023-01-11 DIAGNOSIS — R33.9 URINARY RETENTION: Primary | ICD-10-CM

## 2023-01-11 DIAGNOSIS — N35.916 STRICTURE OF OVERLAPPING SITES OF URETHRA IN MALE, UNSPECIFIED STRICTURE TYPE: ICD-10-CM

## 2023-01-11 PROCEDURE — 99214 OFFICE O/P EST MOD 30 MIN: CPT | Performed by: NURSE PRACTITIONER

## 2023-01-11 PROCEDURE — G8484 FLU IMMUNIZE NO ADMIN: HCPCS | Performed by: NURSE PRACTITIONER

## 2023-01-11 PROCEDURE — G8420 CALC BMI NORM PARAMETERS: HCPCS | Performed by: NURSE PRACTITIONER

## 2023-01-11 PROCEDURE — 1036F TOBACCO NON-USER: CPT | Performed by: NURSE PRACTITIONER

## 2023-01-11 PROCEDURE — 1123F ACP DISCUSS/DSCN MKR DOCD: CPT | Performed by: NURSE PRACTITIONER

## 2023-01-11 PROCEDURE — G8427 DOCREV CUR MEDS BY ELIG CLIN: HCPCS | Performed by: NURSE PRACTITIONER

## 2023-01-11 NOTE — TELEPHONE ENCOUNTER
Patient has history or urethral stricture, not comfortable with patient changing at home d/t possible difficult ortega placement. Continue 3 week cath changes and irrigation as needed.

## 2023-01-11 NOTE — PROGRESS NOTES
[address]ac  [address]  [address]  [facility] [address]  Dept: [phone]  Loc: [phone]  Visit Date: 1/11/2023    RUSTY Lopez is a 80 y.o. male that presents to the urology clinic for hematuria, ortega not draining well.     1/11/2023  Patient was in ED yesterday for low urine output. Catheter exchanged in ED 1/10/2023. Upon arrival patient's urine was dark, tea colored. No clots present upon examination. Johnny, MA attempted to flush however no initial urine return. Deflated balloon with only 5cc out and catheter was noted to be in prostate and not in bladder. Catheter pushed into bladder, inflated with 10cc and adequate urine return. Catheter was irrigated by MA without difficulty. Patient/wife reports catheter is getting clogged prior to 3 week exchanges. Dilated in past. Would like cystoscopy performed to evaluate stricture and prostate. No fever or chills. No nausea or vomiting. No chest pain or shortness of breath. Most recent PSA: 10/2022 <0.02  UA: n/a  PVR: ortega in place  Pain Scale 0    Summary of old records:    Bulbar urethral stricture  Failed voiding trials   Has indwelling catheter- irrigated the catheter himself to prevent mucus and clots  Stricture has been dilated in the past  Hx of brachytherapy     Gross hematuria  Resolved, hand irrigating PRN. Prostate cancer  Hx of brachytherapy  Psa reviewed     I independently reviewed and verified the images and reports from:    No results found.     Last total testosterone:  No results found for: TESTOSTERONE      Last BUN and creatinine:  Lab Results   Component Value Date    BUN 16 12/26/2022     Lab Results   Component Value Date    CREATININE 1.0 12/26/2022           PAST MEDICAL, FAMILY AND SOCIAL HISTORY UPDATE:  Past Medical History:   Diagnosis Date    Hyperlipidemia     Hypertension     Prostate cancer [facility])      Past Surgical History:   Procedure Laterality Date    COLONOSCOPY      CYSTOSCOPY Left 10/14/2021    CYSTOSCOPY EVACUATION OF CLOTS, EVACUATION OF BLADDER STONE, CHANNEL TURP  performed by Leslee Iraheta MD at [address] [place] [address] [place] FLX DX W/PAOLO Wagner  PFRMD Left 2018    COLONOSCOPY performed by Ana Rice MD at [address]      seed implant    TONSILLECTOMY       Family History   Problem Relation Age of Onset    Diabetes Father      No outpatient medications have been marked as taking for the 23 encounter (Office Visit) with MARIKA Mcclellan CNP. Patient has no known allergies. Social History     Tobacco Use   Smoking Status Former    Types: Cigarettes    Quit date:     Years since quittin.0   Smokeless Tobacco Never       Social History     Substance and Sexual Activity   Alcohol Use No       REVIEW OF SYSTEMS:  Constitutional: negative  Eyes: negative  Respiratory: negative  Cardiovascular: negative  Gastrointestinal: negative  Musculoskeletal: negative  Genitourinary: negative except for what is in HPI  Skin: negative   Neurological: negative  Hematological/Lymphatic: negative  Psychological: negative    Physical Exam:    There were no vitals filed for this visit. Patient is a 80 y.o. male in no acute distress and alert and oriented to person, place and time. Pulmonary: Non-labored respiration. Cardiovascular: Normal rate, regular rhythm, normal peripheral pulses. Skin: Warm and dry. Psych: Normal mood and affect. Genitourinary: Bladder non-distended and non-tender. Assessment and Plan   1. Urinary retention [R33.9 (ICD-10-CM)]  Garduno in place, draining well. Last exchanged was 1/10/2023, continue catheter changes every 3 weeks. 2. Gross hematuria  Resolved. Continue hand irrigation PRN.      3. 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 Was dilated in the past.     Wife/patient requesting to be seen by Dr. Nolvia Powell. Offered cystoscopy d/t urethral stricture and occasionally hematuria. Patient would like to proceed.        Daren Bonilla, MARIKA - CNP

## 2023-01-11 NOTE — PROGRESS NOTES
Patient arrives to office with dark and bloody urine. Patient place on bed to irrigate catheter. Ortega was disconnected from leg bag and used sterile water to irrigate catheter. After pushing 150cc of sterile water into bladder there was no return and ortega catheter would not drain. Balloon of catheter was deflated abd found to only have 5cc of sterile water in the balloon. With balloon deflated catheter was adjusted to find that catheter was not in all the way. Catheter adjusted and balloon inflated with 10cc of sterile water. Catheter then flushed with any additional 70cc of sterile water to ensure draining well and that urine was clear. Ortega was reattached to leg bag, and patient was sent home with instructions to call if any other issues.

## 2023-01-12 ENCOUNTER — TELEPHONE (OUTPATIENT)
Dept: INTERNAL MEDICINE CLINIC | Age: 88
End: 2023-01-12

## 2023-01-12 ENCOUNTER — HOSPITAL ENCOUNTER (EMERGENCY)
Age: 88
Discharge: HOME | End: 2023-01-12
Attending: EMERGENCY MEDICINE
Payer: MEDICARE

## 2023-01-12 ENCOUNTER — TELEPHONE (OUTPATIENT)
Dept: UROLOGY | Age: 88
End: 2023-01-12

## 2023-01-12 VITALS
HEIGHT: 72 IN | SYSTOLIC BLOOD PRESSURE: 141 MMHG | HEART RATE: 65 BPM | DIASTOLIC BLOOD PRESSURE: 80 MMHG | BODY MASS INDEX: 22.35 KG/M2 | OXYGEN SATURATION: 95 % | WEIGHT: 165 LBS | TEMPERATURE: 98.9 F | RESPIRATION RATE: 16 BRPM

## 2023-01-12 DIAGNOSIS — N30.01 ACUTE CYSTITIS WITH HEMATURIA: Primary | ICD-10-CM

## 2023-01-12 LAB
AMORPHOUS: ABNORMAL
ANION GAP SERPL CALCULATED.3IONS-SCNC: 12 MEQ/L (ref 8–16)
BACTERIA: ABNORMAL /HPF
BASOPHILS # BLD: 0.4 %
BASOPHILS ABSOLUTE: 0 THOU/MM3 (ref 0–0.1)
BILIRUBIN URINE: NEGATIVE
BLOOD, URINE: ABNORMAL
BUN BLDV-MCNC: 27 MG/DL (ref 7–22)
CALCIUM SERPL-MCNC: 8.6 MG/DL (ref 8.5–10.5)
CASTS 2: ABNORMAL /LPF
CASTS UA: ABNORMAL /LPF
CHARACTER, URINE: ABNORMAL
CHLORIDE BLD-SCNC: 96 MEQ/L (ref 98–111)
CO2: 22 MEQ/L (ref 23–33)
COLOR: YELLOW
CREAT SERPL-MCNC: 1.2 MG/DL (ref 0.4–1.2)
CRYSTALS, UA: ABNORMAL
EOSINOPHIL # BLD: 0 %
EOSINOPHILS ABSOLUTE: 0 THOU/MM3 (ref 0–0.4)
EPITHELIAL CELLS, UA: ABNORMAL /HPF
ERYTHROCYTE [DISTWIDTH] IN BLOOD BY AUTOMATED COUNT: 13.9 % (ref 11.5–14.5)
ERYTHROCYTE [DISTWIDTH] IN BLOOD BY AUTOMATED COUNT: 47.5 FL (ref 35–45)
GFR SERPL CREATININE-BSD FRML MDRD: 58 ML/MIN/1.73M2
GLUCOSE BLD-MCNC: 114 MG/DL (ref 70–108)
GLUCOSE URINE: NEGATIVE MG/DL
HCT VFR BLD CALC: 37.6 % (ref 42–52)
HEMOGLOBIN: 12.8 GM/DL (ref 14–18)
IMMATURE GRANS (ABS): 0.04 THOU/MM3 (ref 0–0.07)
IMMATURE GRANULOCYTES: 0.5 %
KETONES, URINE: ABNORMAL
LEUKOCYTE ESTERASE, URINE: ABNORMAL
LYMPHOCYTES # BLD: 6.2 %
LYMPHOCYTES ABSOLUTE: 0.5 THOU/MM3 (ref 1–4.8)
MCH RBC QN AUTO: 31.4 PG (ref 26–33)
MCHC RBC AUTO-ENTMCNC: 34 GM/DL (ref 32.2–35.5)
MCV RBC AUTO: 92.4 FL (ref 80–94)
MISCELLANEOUS 2: ABNORMAL
MONOCYTES # BLD: 9.2 %
MONOCYTES ABSOLUTE: 0.8 THOU/MM3 (ref 0.4–1.3)
NITRITE, URINE: NEGATIVE
NUCLEATED RED BLOOD CELLS: 0 /100 WBC
OSMOLALITY CALCULATION: 266.8 MOSMOL/KG (ref 275–300)
PH UA: 6 (ref 5–9)
PLATELET # BLD: 237 THOU/MM3 (ref 130–400)
PMV BLD AUTO: 9.3 FL (ref 9.4–12.4)
POTASSIUM REFLEX MAGNESIUM: 4 MEQ/L (ref 3.5–5.2)
PROTEIN UA: 100
RBC # BLD: 4.07 MILL/MM3 (ref 4.7–6.1)
RBC URINE: ABNORMAL /HPF
RENAL EPITHELIAL, UA: ABNORMAL
SEG NEUTROPHILS: 83.7 %
SEGMENTED NEUTROPHILS ABSOLUTE COUNT: 7 THOU/MM3 (ref 1.8–7.7)
SODIUM BLD-SCNC: 130 MEQ/L (ref 135–145)
SPECIFIC GRAVITY, URINE: 1.01 (ref 1–1.03)
UROBILINOGEN, URINE: 0.2 EU/DL (ref 0–1)
WBC # BLD: 8.4 THOU/MM3 (ref 4.8–10.8)
WBC UA: ABNORMAL /HPF
YEAST: ABNORMAL

## 2023-01-12 PROCEDURE — 85025 COMPLETE CBC W/AUTO DIFF WBC: CPT

## 2023-01-12 PROCEDURE — 6370000000 HC RX 637 (ALT 250 FOR IP)

## 2023-01-12 PROCEDURE — 36415 COLL VENOUS BLD VENIPUNCTURE: CPT

## 2023-01-12 PROCEDURE — 99283 EMERGENCY DEPT VISIT LOW MDM: CPT

## 2023-01-12 PROCEDURE — 87077 CULTURE AEROBIC IDENTIFY: CPT

## 2023-01-12 PROCEDURE — 81001 URINALYSIS AUTO W/SCOPE: CPT

## 2023-01-12 PROCEDURE — 87186 SC STD MICRODIL/AGAR DIL: CPT

## 2023-01-12 PROCEDURE — 80048 BASIC METABOLIC PNL TOTAL CA: CPT

## 2023-01-12 PROCEDURE — 87086 URINE CULTURE/COLONY COUNT: CPT

## 2023-01-12 RX ORDER — CEPHALEXIN 500 MG/1
500 CAPSULE ORAL 4 TIMES DAILY
Qty: 28 CAPSULE | Refills: 0 | Status: SHIPPED | OUTPATIENT
Start: 2023-01-12 | End: 2023-01-19

## 2023-01-12 RX ORDER — GRANULES FOR ORAL 3 G/1
3 POWDER ORAL ONCE
Status: COMPLETED | OUTPATIENT
Start: 2023-01-12 | End: 2023-01-12

## 2023-01-12 RX ADMIN — GRANULES FOR ORAL SOLUTION 1 PACKET: 3 POWDER ORAL at 13:33

## 2023-01-12 NOTE — TELEPHONE ENCOUNTER
Patient wife stated patient has hematuria, chills, some confusion. Patient was recently in ER and has had problems with the catheter since. The confusion started last night and has increase fatigue. The ortega is patent. Patient advised to go to the ER.

## 2023-01-12 NOTE — ED NOTES
Pt ambulatory to rm 32 states he had a ortega cath placed in ER recently that caused damage/irritation to his prostate, f/u @urologist yesterday and they repositioned his cath. Pt states bloody urine clearing up but he noticed a little less output this morning. Also reports that he felt 'confused' yesterday/last night had some chills, states he feels fine and normal today- he thinks it was due to caffeine withdrawal because it all got better after he drank two cups of coffee but urology wants urine checked for infection. Pt appears in no acute distress, VSS, denies pain or other concerns. SO at bedside.       Sheela Mauro, TAQUERIA  01/12/23 [address], RN  01/12/23 1125

## 2023-01-12 NOTE — DISCHARGE INSTRUCTIONS
You were seen in the emergency department today. Your blood work was similar to prior. Your urine did show some white blood cells which could indicate infection. A culture was sent for confirmation. You are given an antibiotic in the emergency department. Additional was sent to your pharmacy. Please take the full course of these antibiotics. Take your medication as indicated and prescribed. If you are given an antibiotic then, make sure you get the prescription filled and take the antibiotics until finished. Drink plenty of water while taking the antibiotics. Avoid drinking alcohol or drinks that have caffeine in it while taking antibiotics. If you were given the medication pyridium (or take over the counter Azo) - do not wear any contacts for the next week since this medication will turn your tears an orange color and will stain the contacts. For pain use acetaminophen (Tylenol) or ibuprofen (Motrin / Advil), unless prescribed medications that have acetaminophen or ibuprofen (or similar medications) in it. You can take over the counter acetaminophen tablets (1 - 2 tablets of the 500-mg strength every 6 hours) or ibuprofen tablets (2 tablets every 4 hours). PLEASE RETURN TO THE EMERGENCY DEPARTMENT IMMEDIATELY for worsening symptoms, inability to urinate, worsening of blood in your urine, or if you develop any concerning symptoms such as: high fever not relieved by acetaminophen (Tylenol) and/or ibuprofen (Motrin / Advil), chills, shortness of breath, chest pain, feeling of your heart fluttering or racing, persistent nausea and/or vomiting, vomiting up blood, blood in your stool, loss of consciousness, numbness, weakness or tingling in the arms or legs or change in color of the extremities, changes in mental status, persistent headache, blurry vision, loss of bladder / bowel.

## 2023-01-12 NOTE — TELEPHONE ENCOUNTER
Called into [address] [place] [address] [facility] and spoke to [employer].   I advised her that he is supposed to be on both cipro and keflex that ER ordered today

## 2023-01-12 NOTE — ED PROVIDER NOTES
[facility] [address] [address] EMERGENCY DEPT      EMERGENCY MEDICINE     Pt Name: Christy Montague  MRN: [id number]  [place] 1935  Date of evaluation: 1/12/2023  Resident Physician: Preethi Rico MD  Supervising Physician: Jere Aiken [id number]       Chief Complaint   Patient presents with    Other     States trouble with ortega cath- not draining well     HISTORY OF PRESENT ILLNESS   Christy Montague is a pleasant 80 y.o. male who presents to the emergency department from home with wife for complaints of urinary retention. Patient has a history of prostate cancer s/p radiation. Has urethral stricture, follows with urology. He was seen by them yesterday for decreased urine output, catheter was readjusted and irrigated. States that his catheter gets \" plugged\" easily. This morning patient says that he woke up and his catheter bag was only 25% full, usually was 9% full. He also had some confusion and chills last night. Per wife, he was unable to dress himself or changes catheter bag which is unlike him. He did not have coffee for 3 days, which he believes contributed to this. He did have 2 cups today and reports that his confusion and chills have resolved. They did check his temperature every hour last night without fever. Denies back pain, chest pain, abdominal pain, headaches, vision changes. Endorses leg swelling, which is chronic.     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    Hematuria R31.9    Acute urinary retention R33.8    Acute cystitis with hematuria N30.01     SURGICAL HISTORY       Past Surgical History:   Procedure Laterality Date COLONOSCOPY      CYSTOSCOPY Left 10/14/2021    CYSTOSCOPY EVACUATION OF CLOTS, EVACUATION OF BLADDER STONE, CHANNEL TURP  performed by Pauly Segundo MD at [address] [place] [address] [place] FLX DX W/PAOLO Edward  PFRMD Left 2018    COLONOSCOPY performed by Ramy Chatterjee MD at [facility]      seed implant    TONSILLECTOMY         CURRENT MEDICATIONS       Discharge Medication List as of 2023  1:48 PM        CONTINUE these medications which have NOT CHANGED    Details   tamsulosin (FLOMAX) 0.4 MG capsule take 1 capsule by mouth twice a day, Disp-60 capsule, R-1Normal      Water For Irrigation, Sterile (STERILE WATER FOR IRRIGATION) Disp-3000 mL, R-11, PrintIrrigate ortega catheter with  mls of sterile water as needed to maintain patency. mirtazapine (REMERON) 30 MG tablet Take 1 tablet by mouth nightly, Disp-30 tablet, R-5Normal      metoprolol tartrate (LOPRESSOR) 25 MG tablet Take 1 tablet by mouth 2 times daily, Disp-60 tablet, R-5Normal      lisinopril (PRINIVIL;ZESTRIL) 10 MG tablet TAKE 1 TABLET TWICE DAILY, Disp-180 tablet, R-3Normal      amLODIPine (NORVASC) 10 MG tablet TAKE 1 TABLET EVERY DAY, Disp-90 tablet, R-3Normal      sodium chloride 0.9 % infusion Flush ortega catheter as needed with 60-100ml of saline as needed, Disp-3000 mL, R-12Normal      Elastic Bandages & Supports (B & B HERNIA BELT) MISC Disp-1 each, R-1, PrintBrand per patient preference      Melatonin 10 MG TABS Take by mouth at bedtimeHistorical Med      Acetaminophen (TYLENOL EXTRA STRENGTH PO) Take by mouth prnHistorical Med      Apoaequorin (PREVAGEN) 10 MG CAPS Take 10 mg by mouth dailyHistorical Med      MULTIPLE VITAMIN PO Take by mouth dailyHistorical Med             ALLERGIES     has No Known Allergies. FAMILY HISTORY     He indicated that his mother is . He indicated that his father is .        SOCIAL HISTORY       Social History     Tobacco Use    Smoking status: Former     Types: Cigarettes     Quit date:      Years since quittin.0    Smokeless tobacco: Never   Vaping Use    Vaping Use: Never used   Substance Use Topics    Alcohol use: No    Drug use: No       PHYSICAL EXAM       ED Triage Vitals [23 1102]   BP Temp Temp Source Heart Rate Resp SpO2 Height Weight   (!) 153/81 98.9 °F (37.2 °C) Oral 82 17 96 % 6' (1.829 m) 165 lb (74.8 kg)       Physical Exam  Vitals and nursing note reviewed. Constitutional:       General: He is not in acute distress. Appearance: He is normal weight. HENT:      Head: Normocephalic and atraumatic. Eyes:      General: No scleral icterus. Extraocular Movements: Extraocular movements intact. Cardiovascular:      Rate and Rhythm: Normal rate and regular rhythm. Pulses: Normal pulses. Heart sounds: Normal heart sounds. No murmur heard. No friction rub. No gallop. Pulmonary:      Effort: Pulmonary effort is normal. No respiratory distress. Breath sounds: Normal breath sounds. No wheezing, rhonchi or rales. Abdominal:      General: Abdomen is flat. There is no distension. Palpations: Abdomen is soft. Tenderness: There is no abdominal tenderness. There is no right CVA tenderness, left CVA tenderness, guarding or rebound. Genitourinary:     Comments: Garduno catheter in place. Yellow clear urine in bag approx 500cc  Musculoskeletal:      Right lower leg: No edema. Left lower leg: No edema. Skin:     General: Skin is warm. Capillary Refill: Capillary refill takes less than 2 seconds. Findings: No bruising, erythema or rash. Neurological:      General: No focal deficit present. Mental Status: He is alert and oriented to person, place, and time. Psychiatric:         Mood and Affect: Mood normal.         Behavior: Behavior normal.         Thought Content:  Thought content normal.         Judgment: Judgment normal.       FORMAL DIAGNOSTIC RESULTS     RADIOLOGY: Interpretation per the Radiologist below, if available at the time of this note (none if blank): No orders to display       LABS: (none if blank)  Labs Reviewed   CBC WITH AUTO DIFFERENTIAL - Abnormal; Notable for the following components:       Result Value    RBC 4.07 (*)     Hemoglobin 12.8 (*)     Hematocrit 37.6 (*)     RDW-SD 47.5 (*)     MPV 9.3 (*)     Lymphocytes Absolute 0.5 (*)     All other components within normal limits   BASIC METABOLIC PANEL W/ REFLEX TO MG FOR LOW K - Abnormal; Notable for the following components:    Sodium 130 (*)     Chloride 96 (*)     CO2 22 (*)     Glucose 114 (*)     BUN 27 (*)     All other components within normal limits   URINE WITH REFLEXED MICRO - Abnormal; Notable for the following components:    Ketones, Urine TRACE (*)     Blood, Urine LARGE (*)     Protein,  (*)     Leukocyte Esterase, Urine LARGE (*)     Character, Urine CLOUDY (*)     All other components within normal limits   GLOMERULAR FILTRATION RATE, ESTIMATED - Abnormal; Notable for the following components:    Est, Glom Filt Rate 58 (*)     All other components within normal limits   OSMOLALITY - Abnormal; Notable for the following components:    Osmolality Calc 266.8 (*)     All other components within normal limits   CULTURE, REFLEXED, URINE   ANION GAP       (Any cultures that may have been sent were not resulted at the time of this patient visit)    [facility] / ED COURSE:     1) Number and Complexity of Problems            Problem List This Visit:         Chief Complaint   Patient presents with    Other     States trouble with ortega cath- not draining well          Differential Diagnosis includes (but not limited to):   Urinary retention, CHARLES, UTI        Diagnoses Considered but I have low suspicion of:   Pyelonephritis, nephrolithiasis             Pertinent Comorbid Conditions:    Prostate cancer s/p radiation, urethral stricture    2)  Data Reviewed (none if left blank)          My Independent interpretations:     EKG:      N/A    Imaging: N/A    Labs:      Hemoglobin is stable at 12.8. No leukocytosis. Slightly hyponatremic at 130. BUN increased at 27. Large leukocyte esterase with cloudy urine on UA. There is blood however no RBCs. External Documentation Reviewed:         Previous patient encounter documents & history available on EMR was reviewed, including urology office visit 1/11/2023, and Urine culture from 12/26/2022 and 10/23/2022, which grew Proteus Mirabella's and Pseudomonas             See Formal Diagnostic Results above for the lab and radiology tests and orders. 3)  Treatment and Disposition         ED Reassessment: Hemodynamically stable. Garduno catheter replaced, with clear yellow output. Denies pain. Episodes of confusion, fevers, or chills in the emergency department. Case discussed with consulting clinician: N/A         Shared Decision-Making was performed and disposition discussed with the patient and family and questions answered. Given patient's confusion has resolved, outpatient treatment of probable urine infection was discussed. Patient and wife in agreement with this plan. Summary of Patient Presentation:  44-year-old male with history of prostate cancer s/p radiation with urethral stricture and indwelling urinary catheter presenting with decreased urine output in catheter bag. Urine with pyuria and few bacteria, culture sent. Given symptoms last night and previous cultures, decision was made to treat with fosfomycin in the emergency department and Keflex was sent to his pharmacy.     MDM     Amount and/or Complexity of Data Reviewed  Clinical lab tests: ordered and reviewed    Risk of Complications, Morbidity, and/or Mortality  Presenting problems: low  Diagnostic procedures: moderate  Management options: moderate    /   Vitals Reviewed:    Vitals:    01/12/23 1102 01/12/23 1303 BP: (!) 153/81 (!) 141/80   Pulse: 82 65   Resp: 17 16   Temp: 98.9 °F (37.2 °C)    TempSrc: Oral    SpO2: 96% 95%   Weight: 165 lb (74.8 kg)    Height: 6' (1.829 m)        The patient was seen and examined. Appropriate diagnostic testing was performed and results reviewed with the patient. The results of pertinent diagnostic studies and exam findings were discussed. The patients provisional diagnosis and plan of care were discussed with the patient and present family who expressed understanding. Any medications were reviewed and indications and risks of medications were discussed with the patient /family present. Strict verbal and written return precautions, instructions and appropriate follow-up provided to  the patient. ED Medications administered this visit:  (None if blank)  Medications   fosfomycin tromethamine (MONUROL) 3 g PACK 1 packet (1 packet Oral Given 1/12/23 6567)         PROCEDURES: (None if blank)  Procedures:       DISCHARGE PRESCRIPTIONS: (None if blank)  Discharge Medication List as of 1/12/2023  1:48 PM        START taking these medications    Details   cephALEXin (KEFLEX) 500 MG capsule Take 1 capsule by mouth 4 times daily for 7 days, Disp-28 capsule, R-0Normal             FINAL IMPRESSION      1.  Acute cystitis with hematuria          DISPOSITION/PLAN   DISPOSITION Decision To Discharge 01/12/2023 02:01:51 PM      OUTPATIENT FOLLOW UP THE PATIENT:  Marcio Qureshi MD  [address] [facility] [address]    Schedule an appointment as soon as possible for a visit   ED follow up    Liza Hodgkin, MD Liza Hodgkin, MD  Resident  01/12/23 7085

## 2023-01-12 NOTE — TELEPHONE ENCOUNTER
Pt notified that Dr. Malu Zambrano would like him to take cipro x 3 days along with the keflex. He verbalizes understanding. Script called in to [employer]. He also comments  occasionally get a loose stool. It is about 30 steps to the rr and sometimes he does not make it. He will cut back to 1 c of coffee per day and report back if still has it.

## 2023-01-12 NOTE — TELEPHONE ENCOUNTER
Patient thinks he drinks 3 cups of coffee. He stopped drinking it because he thought it irritated his bladder. He thinks that may be why he was confused. The urine is more clear now. He no longer is chilling. Patient advised there is no way to know if his symptoms are from infection or withdrawal from caffeine and recommended to go to the ER for evaluation. If refused to go to the ER to at least be evaluated by PCP today.

## 2023-01-12 NOTE — ED NOTES
Garduno cath switched from leg bag to standard drain bag. Urine appears dark yellow but clear, 200 ml noted in leg bag. Sample collected and sent.       Estefanía Medina RN  01/12/23 4612

## 2023-01-12 NOTE — TELEPHONE ENCOUNTER
Pt called in saying he had to go into ER today again. The urine in his night bag was only 20% full and it is usually 90% and had blood in it. He was very very confused yestreday and his mind was fogged up. He was shakey and had chills last night also. He says he stopped drinking coffee on 1/10/23 usually drinks 3 cups per day. He thought his symptoms might have been cause by no coffee and by the time he drank 2 cups his mind cleared. He wondered if his symptoms were related to no caffeine? The ER gave him something there for cystitis and told him they will call him on Saturday if the antibiotic needs changed. Pt says his urine is now more clear and flow has increased dramatically. He just wanted Dr Comfort Paulino to know that he is on the cephalexin.

## 2023-01-12 NOTE — ED NOTES
Garduno cath switched back to leg bag per request. DC home in stable condition.       Dian Barclay, TAQUERIA  01/12/23 7620

## 2023-01-13 RX ORDER — CIPROFLOXACIN 500 MG/1
500 TABLET, FILM COATED ORAL 2 TIMES DAILY
Qty: 6 TABLET | Refills: 1 | OUTPATIENT
Start: 2023-01-13 | End: 2023-01-16

## 2023-01-13 NOTE — TELEPHONE ENCOUNTER
Patient was called. He said he will try to get metamucil, but he feels he does not need it because he is feeling much better. He only feels tired now, but says he feels a lot better.

## 2023-01-15 NOTE — PROGRESS NOTES
Pharmacy Note  ED Culture Follow-up    Oxana Espinoza is a 80 y.o. male. Allergies: Patient has no known allergies. Labs:  Lab Results   Component Value Date    BUN 27 (H) 01/12/2023    CREATININE 1.2 01/12/2023    WBC 8.4 01/12/2023     Estimated Creatinine Clearance: 46 mL/min (based on SCr of 1.2 mg/dL). Current antimicrobials:   Fosfomycin 3g x1, cephalexin 500 mg 4x daily x 7 days, ciprofloxacin 500 mg BID x 3 days    ASSESSMENT:  Micro results:   Urine culture: positive for proteus mirabilis and pseudomonas aeruginosa     PLAN:  Need for intervention: Yes  Discussed with: Denae Wolf DO  Chosen treatment:    Patient already on appropriate treatment as above    Patient response:   No need to contact patient    Called/sent in prescription to: Not applicable    Please call with any questions.  Toy Shabazz [facility], PharmD 4:46 PM 1/15/2023

## 2023-01-16 ENCOUNTER — TELEPHONE (OUTPATIENT)
Dept: INTERNAL MEDICINE CLINIC | Age: 88
End: 2023-01-16

## 2023-01-16 DIAGNOSIS — N30.01 ACUTE CYSTITIS WITH HEMATURIA: Primary | ICD-10-CM

## 2023-01-16 NOTE — TELEPHONE ENCOUNTER
Pt called in saying he finished his cipro and is still taking the Keflex. He feels much much better. He says he  has 2000 ml  out per night. He is wondering if he needs to get the cipro refilled for another 3 days? Also pt mention that the ER visit on 1/10 when he went in for urinary retention the nurse in the ER did not put in the catheter correctly. On the visit she put in the catheter and when the doc came in it had come out and then she put another back in and blood spurted out. The next day he went to Dr. Jeanette Brown office and they said it was put into the prostate and Dr. Araceli Shen had to reposition it. Subsequently pt developed an infection and went back to ER on 1/12/23. Pt would like me to contact someone at hospital about this,  He is not looking to do anything about it but he would like the nurse re educated on how to properly put in a catheter so this will not happen to him again or someone else. I contacted Gloria Parr at Patient relations and she will reach out to someone in the ER about this and then get back with pt.

## 2023-01-16 NOTE — TELEPHONE ENCOUNTER
Need a clarification.   Do you only want him on Hipprex 10 days and then do an ultrasound of bladder?

## 2023-01-16 NOTE — TELEPHONE ENCOUNTER
----- Message from Reji Coats MD sent at 1/14/2023  9:07 AM EST -----  Tell him he had 2 organisms, sensitive to th 2 antibiotics    Would like him to try methenamine hippurate a nonspecific antibacterial; not an antibiotic; dose is one gm bid; o u/s 10 days later

## 2023-01-17 ENCOUNTER — TELEPHONE (OUTPATIENT)
Dept: UROLOGY | Age: 88
End: 2023-01-17

## 2023-01-17 ENCOUNTER — APPOINTMENT (OUTPATIENT)
Dept: CT IMAGING | Age: 88
End: 2023-01-17
Payer: OTHER MISCELLANEOUS

## 2023-01-17 ENCOUNTER — HOSPITAL ENCOUNTER (EMERGENCY)
Age: 88
Discharge: HOME | End: 2023-01-17
Attending: STUDENT IN AN ORGANIZED HEALTH CARE EDUCATION/TRAINING PROGRAM
Payer: OTHER MISCELLANEOUS

## 2023-01-17 VITALS
RESPIRATION RATE: 20 BRPM | WEIGHT: 165 LBS | OXYGEN SATURATION: 90 % | BODY MASS INDEX: 22.35 KG/M2 | HEART RATE: 93 BPM | DIASTOLIC BLOOD PRESSURE: 96 MMHG | TEMPERATURE: 98 F | SYSTOLIC BLOOD PRESSURE: 151 MMHG | HEIGHT: 72 IN

## 2023-01-17 DIAGNOSIS — S22.20XA CLOSED FRACTURE OF STERNUM, UNSPECIFIED PORTION OF STERNUM, INITIAL ENCOUNTER: ICD-10-CM

## 2023-01-17 DIAGNOSIS — R07.9 CHEST PAIN, UNSPECIFIED TYPE: ICD-10-CM
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
## 2023-01-17 LAB
ALBUMIN SERPL-MCNC: 3.8 G/DL (ref 3.5–5.1)
ALP BLD-CCNC: 86 U/L (ref 38–126)
ALT SERPL-CCNC: 36 U/L (ref 11–66)
ANION GAP SERPL CALCULATED.3IONS-SCNC: 12 MEQ/L (ref 8–16)
AST SERPL-CCNC: 37 U/L (ref 5–40)
BASOPHILS # BLD: 0.6 %
BASOPHILS ABSOLUTE: 0 THOU/MM3 (ref 0–0.1)
BILIRUB SERPL-MCNC: 0.3 MG/DL (ref 0.3–1.2)
BUN BLDV-MCNC: 12 MG/DL (ref 7–22)
CALCIUM SERPL-MCNC: 9 MG/DL (ref 8.5–10.5)
CHLORIDE BLD-SCNC: 99 MEQ/L (ref 98–111)
CO2: 24 MEQ/L (ref 23–33)
CREAT SERPL-MCNC: 0.9 MG/DL (ref 0.4–1.2)
EOSINOPHIL # BLD: 1.8 %
EOSINOPHILS ABSOLUTE: 0.1 THOU/MM3 (ref 0–0.4)
ERYTHROCYTE [DISTWIDTH] IN BLOOD BY AUTOMATED COUNT: 14.1 % (ref 11.5–14.5)
ERYTHROCYTE [DISTWIDTH] IN BLOOD BY AUTOMATED COUNT: 48.7 FL (ref 35–45)
GFR SERPL CREATININE-BSD FRML MDRD: > 60 ML/MIN/1.73M2
GLUCOSE BLD-MCNC: 160 MG/DL (ref 70–108)
HCT VFR BLD CALC: 40.8 % (ref 42–52)
HEMOGLOBIN: 13.6 GM/DL (ref 14–18)
IMMATURE GRANS (ABS): 0.36 THOU/MM3 (ref 0–0.07)
IMMATURE GRANULOCYTES: 4.4 %
LYMPHOCYTES # BLD: 11.7 %
LYMPHOCYTES ABSOLUTE: 1 THOU/MM3 (ref 1–4.8)
MCH RBC QN AUTO: 31.3 PG (ref 26–33)
MCHC RBC AUTO-ENTMCNC: 33.3 GM/DL (ref 32.2–35.5)
MCV RBC AUTO: 93.8 FL (ref 80–94)
MONOCYTES # BLD: 10.3 %
MONOCYTES ABSOLUTE: 0.8 THOU/MM3 (ref 0.4–1.3)
NUCLEATED RED BLOOD CELLS: 0 /100 WBC
OSMOLALITY CALCULATION: 273.3 MOSMOL/KG (ref 275–300)
PLATELET # BLD: 362 THOU/MM3 (ref 130–400)
PMV BLD AUTO: 9.6 FL (ref 9.4–12.4)
POTASSIUM SERPL-SCNC: 3.7 MEQ/L (ref 3.5–5.2)
RBC # BLD: 4.35 MILL/MM3 (ref 4.7–6.1)
SEG NEUTROPHILS: 71.2 %
SEGMENTED NEUTROPHILS ABSOLUTE COUNT: 5.8 THOU/MM3 (ref 1.8–7.7)
SODIUM BLD-SCNC: 135 MEQ/L (ref 135–145)
TOTAL PROTEIN: 6.8 G/DL (ref 6.1–8)
TROPONIN T: < 0.01 NG/ML
TROPONIN T: < 0.01 NG/ML
WBC # BLD: 8.2 THOU/MM3 (ref 4.8–10.8)

## 2023-01-17 PROCEDURE — 6370000000 HC RX 637 (ALT 250 FOR IP): Performed by: EMERGENCY MEDICINE

## 2023-01-17 PROCEDURE — 6360000004 HC RX CONTRAST MEDICATION: Performed by: STUDENT IN AN ORGANIZED HEALTH CARE EDUCATION/TRAINING PROGRAM

## 2023-01-17 PROCEDURE — 71250 CT THORAX DX C-: CPT

## 2023-01-17 PROCEDURE — 36415 COLL VENOUS BLD VENIPUNCTURE: CPT

## 2023-01-17 PROCEDURE — 84484 ASSAY OF TROPONIN QUANT: CPT

## 2023-01-17 PROCEDURE — 80053 COMPREHEN METABOLIC PANEL: CPT

## 2023-01-17 PROCEDURE — 93005 ELECTROCARDIOGRAM TRACING: CPT | Performed by: STUDENT IN AN ORGANIZED HEALTH CARE EDUCATION/TRAINING PROGRAM

## 2023-01-17 PROCEDURE — 99285 EMERGENCY DEPT VISIT HI MDM: CPT

## 2023-01-17 PROCEDURE — 85025 COMPLETE CBC W/AUTO DIFF WBC: CPT

## 2023-01-17 PROCEDURE — 71260 CT THORAX DX C+: CPT

## 2023-01-17 RX ORDER — HYDROCODONE BITARTRATE AND ACETAMINOPHEN 5; 325 MG/1; MG/1
1 TABLET ORAL ONCE
Status: COMPLETED | OUTPATIENT
Start: 2023-01-17 | End: 2023-01-17

## 2023-01-17 RX ORDER — ONDANSETRON 4 MG/1
4 TABLET, ORALLY DISINTEGRATING ORAL 3 TIMES DAILY PRN
Qty: 21 TABLET | Refills: 0 | Status: SHIPPED | OUTPATIENT
Start: 2023-01-17

## 2023-01-17 RX ORDER — HYDROCODONE BITARTRATE AND ACETAMINOPHEN 5; 325 MG/1; MG/1
1 TABLET ORAL EVERY 6 HOURS PRN
Qty: 12 TABLET | Refills: 0 | Status: SHIPPED | OUTPATIENT
Start: 2023-01-17 | End: 2023-01-20

## 2023-01-17 RX ORDER — METHENAMINE HIPPURATE 1000 MG/1
1 TABLET ORAL 2 TIMES DAILY WITH MEALS
Qty: 60 TABLET | Refills: 5 | OUTPATIENT
Start: 2023-01-17

## 2023-01-17 RX ADMIN — HYDROCODONE BITARTRATE AND ACETAMINOPHEN 1 TABLET: 5; 325 TABLET ORAL at 22:18

## 2023-01-17 RX ADMIN — IOPAMIDOL 80 ML: 755 INJECTION, SOLUTION INTRAVENOUS at 20:11

## 2023-01-17 ASSESSMENT — PAIN SCALES - GENERAL: PAINLEVEL_OUTOF10: 7

## 2023-01-17 ASSESSMENT — HEART SCORE: ECG: 0

## 2023-01-17 NOTE — ED NOTES
First encounter at this time with patient. Patient taken off bedpan at this time.       Ishaan Angeles RN  01/17/23 6148

## 2023-01-18 ENCOUNTER — TELEPHONE (OUTPATIENT)
Dept: INTERNAL MEDICINE CLINIC | Age: 88
End: 2023-01-18

## 2023-01-18 LAB
EKG ATRIAL RATE: 85 BPM
EKG P AXIS: 47 DEGREES
EKG P-R INTERVAL: 162 MS
EKG Q-T INTERVAL: 384 MS
EKG QRS DURATION: 96 MS
EKG QTC CALCULATION (BAZETT): 456 MS
EKG R AXIS: -15 DEGREES
EKG T AXIS: 46 DEGREES
EKG VENTRICULAR RATE: 85 BPM

## 2023-01-18 PROCEDURE — 93010 ELECTROCARDIOGRAM REPORT: CPT | Performed by: INTERNAL MEDICINE
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
## 2023-01-19 ENCOUNTER — TELEPHONE (OUTPATIENT)
Dept: UROLOGY | Age: 88
End: 2023-01-19

## 2023-01-19 NOTE — TELEPHONE ENCOUNTER
Patient's spouse is calling regarding his appt on 01/24/2023 to have his catheter changed. She is requesting this appt to be late afternoon.  Please advise

## 2023-01-19 NOTE — TELEPHONE ENCOUNTER
I spoke with pt and he feels his hearing may be improved a little. He wants to wait to see how it gets. He will let us know on his visit on 1/23/23 if wants to see ENT.

## 2023-01-23 ENCOUNTER — OFFICE VISIT (OUTPATIENT)
Dept: INTERNAL MEDICINE CLINIC | Age: 88
End: 2023-01-23
Payer: COMMERCIAL

## 2023-01-23 VITALS
TEMPERATURE: 97.7 F | HEART RATE: 58 BPM | HEIGHT: 72 IN | DIASTOLIC BLOOD PRESSURE: 56 MMHG | BODY MASS INDEX: 23.35 KG/M2 | SYSTOLIC BLOOD PRESSURE: 132 MMHG | WEIGHT: 172.4 LBS

## 2023-01-23 DIAGNOSIS — H91.13 PRESBYCUSIS OF BOTH EARS: ICD-10-CM

## 2023-01-23 DIAGNOSIS — I10 PRIMARY HYPERTENSION: ICD-10-CM

## 2023-01-23 DIAGNOSIS — Z97.8 FOLEY CATHETER IN PLACE: ICD-10-CM

## 2023-01-23 DIAGNOSIS — G25.0 ESSENTIAL TREMOR: ICD-10-CM

## 2023-01-23 DIAGNOSIS — S22.22XA CLOSED FRACTURE OF BODY OF STERNUM, INITIAL ENCOUNTER: Primary | ICD-10-CM

## 2023-01-23 PROCEDURE — 1123F ACP DISCUSS/DSCN MKR DOCD: CPT | Performed by: INTERNAL MEDICINE

## 2023-01-23 PROCEDURE — G8427 DOCREV CUR MEDS BY ELIG CLIN: HCPCS | Performed by: INTERNAL MEDICINE

## 2023-01-23 PROCEDURE — G8420 CALC BMI NORM PARAMETERS: HCPCS | Performed by: INTERNAL MEDICINE

## 2023-01-23 PROCEDURE — 99212 OFFICE O/P EST SF 10 MIN: CPT | Performed by: INTERNAL MEDICINE

## 2023-01-23 PROCEDURE — G8484 FLU IMMUNIZE NO ADMIN: HCPCS | Performed by: INTERNAL MEDICINE

## 2023-01-23 PROCEDURE — 1036F TOBACCO NON-USER: CPT | Performed by: INTERNAL MEDICINE

## 2023-01-23 SDOH — ECONOMIC STABILITY: FOOD INSECURITY: WITHIN THE PAST 12 MONTHS, THE FOOD YOU BOUGHT JUST DIDN'T LAST AND YOU DIDN'T HAVE MONEY TO GET MORE.: NEVER TRUE

## 2023-01-23 SDOH — ECONOMIC STABILITY: FOOD INSECURITY: WITHIN THE PAST 12 MONTHS, YOU WORRIED THAT YOUR FOOD WOULD RUN OUT BEFORE YOU GOT MONEY TO BUY MORE.: NEVER TRUE

## 2023-01-23 ASSESSMENT — SOCIAL DETERMINANTS OF HEALTH (SDOH): HOW HARD IS IT FOR YOU TO PAY FOR THE VERY BASICS LIKE FOOD, HOUSING, MEDICAL CARE, AND HEATING?: NOT HARD AT ALL

## 2023-01-23 ASSESSMENT — PATIENT HEALTH QUESTIONNAIRE - PHQ9
SUM OF ALL RESPONSES TO PHQ QUESTIONS 1-9: 0
SUM OF ALL RESPONSES TO PHQ QUESTIONS 1-9: 0
1. LITTLE INTEREST OR PLEASURE IN DOING THINGS: 0
SUM OF ALL RESPONSES TO PHQ QUESTIONS 1-9: 0
SUM OF ALL RESPONSES TO PHQ QUESTIONS 1-9: 0

## 2023-01-23 NOTE — TELEPHONE ENCOUNTER
----- Message from Kathia Castellanos MD sent at 7/19/2022 12:21 PM EDT -----  Was in ER for plugged ortega; tell him only take the antibiotic 2 days if no fever HISTORY:    The patient is a 51 year old female who comes in for an acute visit.  Please see below for more details.    Patient Active Problem List   Diagnosis   • Adult ADHD   • Overweight (BMI 25.0-29.9)   • Tobacco abuse   • Dyslipidemia       Current Outpatient Medications   Medication Sig   • lisdexamfetamine (Vyvanse) 30 MG capsule Take 1 capsule by mouth 2 times daily.   • buPROPion (WELLBUTRIN SR) 150 MG 12 hr tablet Take 1 tablet by mouth 2 times daily.     No current facility-administered medications for this visit.       Allergies as of 2023   • (No Known Allergies)       Past Medical History:   Diagnosis Date   • Adult ADHD    • Anxiety    • Leg fracture    • Positive urine drug screen 2022    THC 2022       Past Surgical History:   Procedure Laterality Date   •  section, classic     • Tibia fracture surgery           REVIEW OF SYSTEMS:  Sore throat started on Saturday. Worse last night and this morning. Ears also hurt. Headaches comes and goes. nack pain. using OTC without relief. No fever.  Review of systems is otherwise negative.  No known exposure to anything.      PHYSICAL EXAMINATION:  Vitals: Blood pressure 116/80, pulse 72, temperature 98.6 °F (37 °C), temperature source Oral, resp. rate 16, height 5' 8\" (1.727 m), weight 80.7 kg (178 lb). Body mass index is 27.06 kg/m².  Patient is alert and in no acute distress.  Eyes are without injection.  Ears external auditory canals are patent tympanic membranes are intact without erythema or drainage.  Nose shows moderate congestion pink membranes.  Posterior pharynx is erythematous but without exudate.  No trismus.  Neck is symmetrical supple with anterior adenopathy.  No posterior nodes.  Heart rate and rhythm regular without murmur.  Lungs show good aeration good respiratory effort and are clear.    ASSESSMENT/PLAN:  Acute pharyngitis.  Quick strep was positive.  Amoxicillin.  Supportive care.  Follow-up if any worse or no  better.
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
## 2023-01-24 ENCOUNTER — NURSE ONLY (OUTPATIENT)
Dept: UROLOGY | Age: 88
End: 2023-01-24
Payer: MEDICARE

## 2023-01-24 DIAGNOSIS — R33.8 ACUTE URINARY RETENTION: Primary | ICD-10-CM

## 2023-01-24 PROCEDURE — 51702 INSERT TEMP BLADDER CATH: CPT | Performed by: UROLOGY

## 2023-01-24 NOTE — PROGRESS NOTES
Patient has given me verbal consent to perform catheter change Yes    Does patient have latex allergy? No  Does patient have shellfish or betadine allergy? No      Following Dr. Stalin Montero plan of care. 18 Fr Catheter changed without difficulty. Once balloon was deflated, removed catheter without difficulty. Cleansed urethral opening with betadine swab. 18 Fr Regular ortega was inserted using sterile water-soluble lubricant without difficulty. Catheter was flushed with 30cc water ensuring return and inflated balloon with 10 ml of water. Ortega Catheter was hooked up to leg bag with straps. Foreskin reduced back down? Yes    Patient instructed on how to drain catheter bags. If patient was given a leg bag, patient was instructed to keep bag above the knee to prevent pulling on catheter. Pt notified if catheter is pulled on may cause pain and bleeding. Patient was also instructed on how to switch from leg bag to overnight bag.           Supplies were provided by office

## 2023-01-26 ENCOUNTER — HOSPITAL ENCOUNTER (EMERGENCY)
Age: 88
Discharge: HOME | End: 2023-01-26
Payer: MEDICARE

## 2023-01-26 ENCOUNTER — APPOINTMENT (OUTPATIENT)
Dept: GENERAL RADIOLOGY | Age: 88
End: 2023-01-26
Payer: MEDICARE

## 2023-01-26 ENCOUNTER — TELEPHONE (OUTPATIENT)
Dept: INTERNAL MEDICINE CLINIC | Age: 88
End: 2023-01-26

## 2023-01-26 VITALS
TEMPERATURE: 98.6 F | RESPIRATION RATE: 16 BRPM | OXYGEN SATURATION: 95 % | SYSTOLIC BLOOD PRESSURE: 112 MMHG | HEART RATE: 57 BPM | DIASTOLIC BLOOD PRESSURE: 67 MMHG

## 2023-01-26 DIAGNOSIS — J06.9 VIRAL URI WITH COUGH: Primary | ICD-10-CM

## 2023-01-26 LAB
FLUAV AG SPEC QL: NEGATIVE
FLUBV AG SPEC QL: NEGATIVE
SARS-COV-2 RDRP RESP QL NAA+PROBE: NOT  DETECTED

## 2023-01-26 PROCEDURE — 87804 INFLUENZA ASSAY W/OPTIC: CPT

## 2023-01-26 PROCEDURE — 99213 OFFICE O/P EST LOW 20 MIN: CPT

## 2023-01-26 PROCEDURE — 99213 OFFICE O/P EST LOW 20 MIN: CPT | Performed by: NURSE PRACTITIONER

## 2023-01-26 PROCEDURE — 87635 SARS-COV-2 COVID-19 AMP PRB: CPT

## 2023-01-26 PROCEDURE — 71046 X-RAY EXAM CHEST 2 VIEWS: CPT

## 2023-01-26 RX ORDER — BENZONATATE 200 MG/1
200 CAPSULE ORAL 3 TIMES DAILY PRN
Qty: 30 CAPSULE | Refills: 0 | Status: SHIPPED | OUTPATIENT
Start: 2023-01-26

## 2023-01-26 ASSESSMENT — ENCOUNTER SYMPTOMS
COUGH: 1
CONSTIPATION: 0
ABDOMINAL PAIN: 0
BLOOD IN STOOL: 0
SINUS PRESSURE: 0
SHORTNESS OF BREATH: 0
NAUSEA: 0
RHINORRHEA: 0
VOMITING: 0
EYE PAIN: 0
WHEEZING: 0
DIARRHEA: 0

## 2023-01-26 NOTE — TELEPHONE ENCOUNTER
Wife called in saying pt has been coughing since they left our office on 1/24/23. He has a lot of thick mucous and has a hard time getting it out. She gave him muccinex which is helping it become looser. He is very tired and has a little confusion however he is not sleeping d/t cough. She feels it is possible he might have some sleep deprivation. He denies fever or chills. She is planning on taking him in to urgent care to be checked. He is not wanting to go to ER. She will report back after the urgent care visit.

## 2023-01-26 NOTE — TELEPHONE ENCOUNTER
Wife called back and pt is negative for covid and flu. They did a cxr and do not feel he has pneumonia. They prescribed Tessalon perles for cough and also Muccinex DM.
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
## 2023-01-27 ENCOUNTER — TELEPHONE (OUTPATIENT)
Dept: INTERNAL MEDICINE CLINIC | Age: 88
End: 2023-01-27
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
## 2023-01-31 ENCOUNTER — TELEPHONE (OUTPATIENT)
Dept: INTERNAL MEDICINE CLINIC | Age: 88
End: 2023-01-31

## 2023-01-31 NOTE — TELEPHONE ENCOUNTER
Pt called in saying he just has chest congestion. It is loose . He is taking Muccinex  bid. He says he does not seem to be getting much better. I called pt and he states he feels better but is coughing and is very loose. He is sleeping in the recliner  so he can cough it out. He has not taken the tessalon perles as he thought he needed to bring it up to prevent pneumonia. Per vo from EF/  Pt should try the tessalon perles to try to get some rest and relief. Should stay on the Muccinex as well.   Pt and wife verbalize understanding

## 2023-02-01 ENCOUNTER — HOSPITAL ENCOUNTER (OUTPATIENT)
Dept: UROLOGY | Age: 88
Discharge: HOME | End: 2023-02-01
Payer: MEDICARE

## 2023-02-01 ENCOUNTER — NURSE ONLY (OUTPATIENT)
Dept: LAB | Age: 88
End: 2023-02-01

## 2023-02-01 VITALS
SYSTOLIC BLOOD PRESSURE: 144 MMHG | OXYGEN SATURATION: 93 % | DIASTOLIC BLOOD PRESSURE: 61 MMHG | TEMPERATURE: 98 F | RESPIRATION RATE: 18 BRPM | HEIGHT: 72 IN | BODY MASS INDEX: 23.74 KG/M2 | HEART RATE: 78 BPM | WEIGHT: 175.3 LBS

## 2023-02-01 DIAGNOSIS — N30.01 ACUTE CYSTITIS WITH HEMATURIA: ICD-10-CM

## 2023-02-01 LAB
BACTERIA URNS QL MICRO: ABNORMAL /HPF
BILIRUB UR QL STRIP.AUTO: NEGATIVE
BILIRUBIN URINE: NEGATIVE
BLOOD URINE, POC: ABNORMAL
CASTS #/AREA URNS LPF: ABNORMAL /LPF
CASTS 2: ABNORMAL /LPF
CHARACTER UR: ABNORMAL
CHARACTER, URINE: CLEAR
COLOR, URINE: YELLOW
COLOR: YELLOW
CRYSTALS URNS MICRO: ABNORMAL
EPITHELIAL CELLS, UA: ABNORMAL /HPF
GLUCOSE UR QL STRIP.AUTO: NEGATIVE MG/DL
GLUCOSE URINE: NEGATIVE MG/DL
HGB UR QL STRIP.AUTO: ABNORMAL
KETONES UR QL STRIP.AUTO: NEGATIVE
KETONES, URINE: NEGATIVE
LEUKOCYTE CLUMPS, URINE: ABNORMAL
MISCELLANEOUS 2: ABNORMAL
NITRITE UR QL STRIP: NEGATIVE
NITRITE, URINE: NEGATIVE
PH UR STRIP.AUTO: 7.5 [PH] (ref 5–9)
PH, URINE: 7 (ref 5–9)
PROT UR STRIP.AUTO-MCNC: 300 MG/DL
PROTEIN, URINE: 30 MG/DL
RBC URINE: > 100 /HPF
RENAL EPI CELLS #/AREA URNS HPF: ABNORMAL /[HPF]
SP GR UR REFRACT.AUTO: 1.02 (ref 1–1.03)
SPECIFIC GRAVITY, URINE: <= 1.005 (ref 1–1.03)
UROBILINOGEN, URINE: 0.2 EU/DL (ref 0–1)
UROBILINOGEN, URINE: 0.2 EU/DL (ref 0–1)
WBC #/AREA URNS HPF: > 200 /HPF
WBC #/AREA URNS HPF: ABNORMAL /[HPF]
YEAST LIKE FUNGI URNS QL MICRO: ABNORMAL

## 2023-02-01 PROCEDURE — 52000 CYSTOURETHROSCOPY: CPT

## 2023-02-01 NOTE — OP NOTE
Dr. Aaron Tello MD  Urologic Surgery        [address] [place] [address]. [place]  02/01/23    Patient:  Loco Ambrose  MRN: [id number]  YOB: 1935    Surgeon: Dr. Aaron Tello MD  Assistant: None    Pre-op Diagnosis: Hematuria  Post-op Diagnosis: Same    Procedure:   Cystoscopy. Anesthesia: Local  Complications: None  OR Blood Loss: Minimal  Fluids: Cystalloids  Specimens: None    Indication  Urethral stricture and hematuria.,     Narrative of the Procedure:    After informed consent was obtained in the preoperative area, the patient was taken back to the operating room and remained on the [facility]. The patient was prepped and draped in a sterile manner. A time out occurred in which two patient identifiers were used. The flexible scope was carefully placed into the bladder. Findings:  Urethra: 2 areas of stricture. Both 16-18F. One is highly calcified. Prostate: Patent  Bladder Neck: Normal  Papillary lesions: None  Trabeculations: Moderate  Cellules/Diverticula: None  Bladder Stones: None  Ureteral Orifices: Normal    OVERALL IMPRESSION: Urethral strictures. 16-18F. Catheter passed with mild constant pressure      Follow-Up: Garduno exchanges q2 weeks. No cancer or bladder lesions. Urethral strictures from prostate radiation w calcification.      Aaron Tello MD  Electronically signed on 2/1/2023 at 4:11 PM

## 2023-02-01 NOTE — H&P
[facility] OUTPATIENT SERVICES - UROLOGY  [address] [facility]  [address] [place], [place] [id number]  Dept: [phone]  Loc: [phone]  Visit Date: 2/1/2023    HPI  Phillip Bates is a 87 y.o. male that presents to the urology clinic for hematuria, ortega not draining well.     1/11/2023  Patient was in ED yesterday for low urine output. Catheter exchanged in ED 1/10/2023.  Upon arrival patient's urine was dark, tea colored. No clots present upon examination. Johnny, MA attempted to flush however no initial urine return. Deflated balloon with only 5cc out and catheter was noted to be in prostate and not in bladder. Catheter pushed into bladder, inflated with 10cc and adequate urine return. Catheter was irrigated by MA without difficulty.   Patient/wife reports catheter is getting clogged prior to 3 week exchanges. Dilated in past. Would like cystoscopy performed to evaluate stricture and prostate.     No fever or chills. No nausea or vomiting. No chest pain or shortness of breath.    Most recent PSA: 10/2022 <0.02  UA: n/a  PVR: ortega in place  Pain Scale 0    Summary of old records:    Bulbar urethral stricture  Failed voiding trials   Has indwelling catheter- irrigated the catheter himself to prevent mucus and clots  Stricture has been dilated in the past  Hx of brachytherapy     Gross hematuria  Resolved, hand irrigating PRN.      Prostate cancer  Hx of brachytherapy  Psa reviewed     I independently reviewed and verified the images and reports from:    No results found.    Last total testosterone:  No results found for: TESTOSTERONE      Last BUN and creatinine:  Lab Results   Component Value Date    BUN 12 01/17/2023     Lab Results   Component Value Date    CREATININE 0.9 01/17/2023           PAST MEDICAL, FAMILY AND SOCIAL HISTORY UPDATE:  Past Medical History:   Diagnosis Date    Hematuria     Hyperlipidemia     Hypertension     Prostate cancer (HCC)     Stricture  of overlapping sites of urethra in male, unspecified stricture type      Past Surgical History:   Procedure Laterality Date    COLONOSCOPY      CYSTOSCOPY Left 10/14/2021    CYSTOSCOPY EVACUATION OF CLOTS, EVACUATION OF BLADDER STONE, CHANNEL TURP  performed by Sammie Bianchi MD at [address] [place] [address] [place] FLX DX W/PAOLO Edward  PFRMD Left 2018    COLONOSCOPY performed by Marcello Bender MD at [address]      seed implant    TONSILLECTOMY       Family History   Problem Relation Age of Onset    Diabetes Father      No outpatient medications have been marked as taking for the 23 encounter [facility] Encounter) with STR URO PROCEDURE ROOM 6. Patient has no known allergies. Social History     Tobacco Use   Smoking Status Former    Types: Cigarettes    Quit date:     Years since quittin.1   Smokeless Tobacco Never       Social History     Substance and Sexual Activity   Alcohol Use No       REVIEW OF SYSTEMS:  Constitutional: negative  Eyes: negative  Respiratory: negative  Cardiovascular: negative  Gastrointestinal: negative  Musculoskeletal: negative  Genitourinary: negative except for what is in HPI  Skin: negative   Neurological: negative  Hematological/Lymphatic: negative  Psychological: negative    Physical Exam:      Vitals:    23 1436   BP: (!) 144/61   Pulse: 78   Resp: 18   Temp: 98 °F (36.7 °C)   SpO2: 93%     Patient is a 80 y.o. male in no acute distress and alert and oriented to person, place and time. Pulmonary: Non-labored respiration. Cardiovascular: Normal rate, regular rhythm, normal peripheral pulses. Skin: Warm and dry. Psych: Normal mood and affect. Genitourinary: Bladder non-distended and non-tender. Assessment and Plan   1. Urinary retention [R33.9 (ICD-10-CM)]  Garduno in place, draining well. Last exchanged was 1/10/2023, continue catheter changes every 3 weeks.      2. Vania Carpio hematuria  Resolved. Continue hand irrigation PRN. 3. 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 Was dilated in the past.     Proceed with cysto.     Benito Gale MD

## 2023-02-04 LAB
BACTERIA UR CULT: ABNORMAL
BACTERIA UR CULT: ABNORMAL
ORGANISM: ABNORMAL
ORGANISM: ABNORMAL

## 2023-02-14 RX ORDER — TAMSULOSIN HYDROCHLORIDE 0.4 MG/1
CAPSULE ORAL
Qty: 60 CAPSULE | Refills: 1 | Status: SHIPPED | OUTPATIENT
Start: 2023-02-14

## 2023-02-14 NOTE — TELEPHONE ENCOUNTER
Patient had cystoscopy with Dr Mateo Nunez on 02/01/2023. If he needs to continue the flomax, please send a new prescription to [address] [place] [address].

## 2023-02-15 ENCOUNTER — NURSE ONLY (OUTPATIENT)
Dept: UROLOGY | Age: 88
End: 2023-02-15
Payer: MEDICARE

## 2023-02-15 DIAGNOSIS — R33.8 ACUTE URINARY RETENTION: Primary | ICD-10-CM

## 2023-02-15 PROCEDURE — 51702 INSERT TEMP BLADDER CATH: CPT

## 2023-02-15 NOTE — PROGRESS NOTES
Patient has given me verbal consent to perform catheter change Yes    Does patient have latex allergy? No  Does patient have shellfish or betadine allergy? No      Following Dr. Maksim Armstrong plan of care. 16 Fr Catheter changed without difficulty. Once balloon was deflated, removed catheter without difficulty. Cleansed urethral opening with betadine swab. 16 Fr regular ortega was inserted using sterile water-soluble lubricant without difficulty. Catheter was flushed with 35cc water ensuring return and inflated balloon with 10 ml of water. Ortega Catheter was hooked up to leg  bag with straps. Foreskin reduced back down? N/A    Patient instructed on how to drain catheter bags. If patient was given a leg bag, patient was instructed to keep bag above the knee to prevent pulling on catheter. Pt notified if catheter is pulled on may cause pain and bleeding. Patient was also instructed on how to switch from leg bag to overnight bag.           Supplies were provided by patient

## 2023-02-27 ENCOUNTER — TELEPHONE (OUTPATIENT)
Dept: INTERNAL MEDICINE CLINIC | Age: 88
End: 2023-02-27

## 2023-02-27 ENCOUNTER — HOSPITAL ENCOUNTER (EMERGENCY)
Age: 88
Discharge: HOME | End: 2023-02-27
Attending: EMERGENCY MEDICINE
Payer: MEDICARE

## 2023-02-27 VITALS
TEMPERATURE: 97.8 F | WEIGHT: 165 LBS | RESPIRATION RATE: 16 BRPM | SYSTOLIC BLOOD PRESSURE: 154 MMHG | HEIGHT: 72 IN | BODY MASS INDEX: 22.35 KG/M2 | OXYGEN SATURATION: 94 % | HEART RATE: 77 BPM | DIASTOLIC BLOOD PRESSURE: 89 MMHG

## 2023-02-27 DIAGNOSIS — T83.9XXA PROBLEM WITH FOLEY CATHETER, INITIAL ENCOUNTER (HCC): Primary | ICD-10-CM

## 2023-02-27 DIAGNOSIS — N39.0 URINARY TRACT INFECTION IN MALE: ICD-10-CM

## 2023-02-27 LAB
BACTERIA URNS QL MICRO: ABNORMAL /HPF
BILIRUB UR QL STRIP.AUTO: NEGATIVE
CASTS #/AREA URNS LPF: ABNORMAL /LPF
CASTS 2: ABNORMAL /LPF
CHARACTER UR: CLEAR
COLOR: YELLOW
CRYSTALS URNS MICRO: ABNORMAL
EPITHELIAL CELLS, UA: ABNORMAL /HPF
GLUCOSE UR QL STRIP.AUTO: NEGATIVE MG/DL
HGB UR QL STRIP.AUTO: ABNORMAL
KETONES UR QL STRIP.AUTO: NEGATIVE
MISCELLANEOUS 2: ABNORMAL
NITRITE UR QL STRIP: POSITIVE
PH UR STRIP.AUTO: 7.5 [PH] (ref 5–9)
PROT UR STRIP.AUTO-MCNC: ABNORMAL MG/DL
RBC URINE: ABNORMAL /HPF
RENAL EPI CELLS #/AREA URNS HPF: ABNORMAL /[HPF]
SP GR UR REFRACT.AUTO: 1.01 (ref 1–1.03)
UROBILINOGEN, URINE: 0.2 EU/DL (ref 0–1)
WBC #/AREA URNS HPF: ABNORMAL /HPF
WBC #/AREA URNS HPF: ABNORMAL /[HPF]
YEAST LIKE FUNGI URNS QL MICRO: ABNORMAL

## 2023-02-27 PROCEDURE — 87077 CULTURE AEROBIC IDENTIFY: CPT

## 2023-02-27 PROCEDURE — 99283 EMERGENCY DEPT VISIT LOW MDM: CPT

## 2023-02-27 PROCEDURE — 87086 URINE CULTURE/COLONY COUNT: CPT

## 2023-02-27 PROCEDURE — 51702 INSERT TEMP BLADDER CATH: CPT

## 2023-02-27 PROCEDURE — 81001 URINALYSIS AUTO W/SCOPE: CPT

## 2023-02-27 PROCEDURE — 87186 SC STD MICRODIL/AGAR DIL: CPT

## 2023-02-27 RX ORDER — CIPROFLOXACIN 500 MG/1
500 TABLET, FILM COATED ORAL 2 TIMES DAILY
Qty: 14 TABLET | Refills: 0 | Status: SHIPPED | OUTPATIENT
Start: 2023-02-27 | End: 2023-03-06

## 2023-02-27 ASSESSMENT — PAIN - FUNCTIONAL ASSESSMENT: PAIN_FUNCTIONAL_ASSESSMENT: NONE - DENIES PAIN
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
## 2023-02-27 NOTE — TELEPHONE ENCOUNTER
Per vo from Dr. Rudy Delarosa advised pt Dr. Talisha Burkett would like him to come in for a visit so he can look in his ears. I gave pt a 3:00 pm appt for tomorrow.

## 2023-02-28 ENCOUNTER — OFFICE VISIT (OUTPATIENT)
Dept: INTERNAL MEDICINE CLINIC | Age: 88
End: 2023-02-28
Payer: MEDICARE

## 2023-02-28 VITALS
BODY MASS INDEX: 23.46 KG/M2 | TEMPERATURE: 97.6 F | HEART RATE: 50 BPM | DIASTOLIC BLOOD PRESSURE: 70 MMHG | HEIGHT: 72 IN | WEIGHT: 173.2 LBS | SYSTOLIC BLOOD PRESSURE: 150 MMHG

## 2023-02-28 DIAGNOSIS — H91.92 DECREASED HEARING OF LEFT EAR: Primary | ICD-10-CM

## 2023-02-28 PROCEDURE — 1036F TOBACCO NON-USER: CPT | Performed by: INTERNAL MEDICINE

## 2023-02-28 PROCEDURE — G8420 CALC BMI NORM PARAMETERS: HCPCS | Performed by: INTERNAL MEDICINE

## 2023-02-28 PROCEDURE — G8484 FLU IMMUNIZE NO ADMIN: HCPCS | Performed by: INTERNAL MEDICINE

## 2023-02-28 PROCEDURE — 99212 OFFICE O/P EST SF 10 MIN: CPT | Performed by: INTERNAL MEDICINE

## 2023-02-28 PROCEDURE — G8427 DOCREV CUR MEDS BY ELIG CLIN: HCPCS | Performed by: INTERNAL MEDICINE

## 2023-02-28 PROCEDURE — 1123F ACP DISCUSS/DSCN MKR DOCD: CPT | Performed by: INTERNAL MEDICINE

## 2023-02-28 SDOH — ECONOMIC STABILITY: FOOD INSECURITY: WITHIN THE PAST 12 MONTHS, YOU WORRIED THAT YOUR FOOD WOULD RUN OUT BEFORE YOU GOT MONEY TO BUY MORE.: NEVER TRUE

## 2023-02-28 SDOH — ECONOMIC STABILITY: FOOD INSECURITY: WITHIN THE PAST 12 MONTHS, THE FOOD YOU BOUGHT JUST DIDN'T LAST AND YOU DIDN'T HAVE MONEY TO GET MORE.: NEVER TRUE

## 2023-02-28 SDOH — ECONOMIC STABILITY: HOUSING INSECURITY
IN THE LAST 12 MONTHS, WAS THERE A TIME WHEN YOU DID NOT HAVE A STEADY PLACE TO SLEEP OR SLEPT IN A SHELTER (INCLUDING NOW)?: NO

## 2023-02-28 SDOH — ECONOMIC STABILITY: INCOME INSECURITY: HOW HARD IS IT FOR YOU TO PAY FOR THE VERY BASICS LIKE FOOD, HOUSING, MEDICAL CARE, AND HEATING?: NOT HARD AT ALL

## 2023-02-28 NOTE — ED NOTES
Urinary catheter replaced per order from Dr. Yared Navarro. Yellow urine returned. Pt expresses relief. Tolerated well.       Kameron Jimenez, TAQUERIA  02/27/23 2111

## 2023-02-28 NOTE — ED TRIAGE NOTES
Pt presents to ED with chief complaint of plugged catheter. Pt states he went to change his catheter bag to his night bag and he normally irrigates his catheter, but was unable to. States his ortega has been draining normally. States his ortega is due to be changed tomorrow.

## 2023-02-28 NOTE — ED PROVIDER NOTES
[address] ENCOUNTER        PATIENT NAME: Bob Loyola  MRN: [id number]  : 1935  LAZARO: 2023  PROVIDER: Rocco Pérez MD    CHIEF COMPLAINT       Chief Complaint   Patient presents with    Urinary Catheter       Patient is seen and evaluated in a timely fashion. Nurses Notes are reviewed and I agree except as noted in the HPI. HISTORY OF PRESENT ILLNESS   Bob Loyola is a 80 y.o. male who presents to Emergency Department with Urinary Catheter    59-year-old female with past medical history of prostate cancer, on brachytherapy, with an indwelling Garduno catheter for about a year presents to ED for clogged Garduno catheter since 7 PM tonight. Patient changed his thigh bag around 7 PM and since then, no urine output. He feels suprapubic fullness and pain. No fever or chills. No chest pain. No flank pain. His pain from SP area is rated at 7/10. No hematuria. He is not taking [address] This HPI was provided by patient. 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 SURGICAL HISTORY      has a past surgical history that includes hernia repair; Hemorrhoid surgery (); Prostate surgery (); Tonsillectomy; Colonoscopy; pr colonoscopy flx dx w/collj spec when pfrmd (Left, 2018); and Cystoscopy (Left, 10/14/2021).     CURRENT MEDICATIONS       Discharge Medication List as of 2023  9:36 PM        CONTINUE these medications which have NOT CHANGED    Details   tamsulosin (FLOMAX) 0.4 MG capsule take 1 capsule by mouth twice a day, Disp-60 capsule, R-1Normal      benzonatate (TESSALON) 200 MG capsule Take 1 capsule by mouth 3 times daily as needed for Cough, Disp-30 capsule, R-0Normal      methenamine (HIPREX) 1 g tablet Take 1 tablet by mouth 2 times daily (with meals), Disp-60 tablet, R-5Phone In      Water For Irrigation, Sterile (STERILE WATER FOR IRRIGATION) Disp-3000 mL, R-11, PrintIrrigate ortega catheter with  mls of sterile water as needed to maintain patency. mirtazapine (REMERON) 30 MG tablet Take 1 tablet by mouth nightly, Disp-30 tablet, R-5Normal      metoprolol tartrate (LOPRESSOR) 25 MG tablet Take 1 tablet by mouth 2 times daily, Disp-60 tablet, R-5Normal      lisinopril (PRINIVIL;ZESTRIL) 10 MG tablet TAKE 1 TABLET TWICE DAILY, Disp-180 tablet, R-3Normal      amLODIPine (NORVASC) 10 MG tablet TAKE 1 TABLET EVERY DAY, Disp-90 tablet, R-3Normal      sodium chloride 0.9 % infusion Flush ortega catheter as needed with 60-100ml of saline as needed, Disp-3000 mL, R-12Normal      Elastic Bandages & Supports (B & B HERNIA BELT) MISC Disp-1 each, R-1, PrintBrand per patient preference      Melatonin 10 MG TABS Take by mouth at bedtimeHistorical Med      Acetaminophen (TYLENOL EXTRA STRENGTH PO) Take by mouth prnHistorical Med      MULTIPLE VITAMIN PO Take by mouth dailyHistorical Med             ALLERGIES     has No Known Allergies. FAMILY HISTORY     He indicated that his mother is . He indicated that his father is . family history includes Diabetes in his father. SOCIAL HISTORY      reports that he quit smoking about 55 years ago. His smoking use included cigarettes. He has never used smokeless tobacco. He reports that he does not drink alcohol and does not use drugs. PHYSICAL EXAM      height is 6' (1.829 m) and weight is 165 lb (74.8 kg). His oral temperature is 97.8 °F (36.6 °C). His blood pressure is 154/89 (abnormal) and his pulse is 77. His respiration is 16 and oxygen saturation is 94%.   Physical Exam  Vitals and nursing note reviewed.   Constitutional:       Appearance: He is well-developed. He is not diaphoretic.   HENT:      Head: Normocephalic and atraumatic.      Nose: Nose normal.   Eyes:      General: No scleral icterus.        Right eye: No discharge.         Left eye: No discharge.      Conjunctiva/sclera: Conjunctivae normal.      Pupils: Pupils are equal, round, and reactive to light.   Neck:      Vascular: No JVD.      Trachea: No tracheal deviation.   Cardiovascular:      Rate and Rhythm: Normal rate and regular rhythm.      Heart sounds: Normal heart sounds. No murmur heard.    No friction rub. No gallop.   Pulmonary:      Effort: Pulmonary effort is normal. No respiratory distress.      Breath sounds: Normal breath sounds. No stridor. No wheezing or rales.   Chest:      Chest wall: No tenderness.   Abdominal:      General: Bowel sounds are normal. There is no distension.      Palpations: Abdomen is soft. There is no mass.      Tenderness: There is no abdominal tenderness. There is no guarding or rebound.      Hernia: No hernia is present.   Genitourinary:     Comments: SP tenderness. Garduno in place, unable to irrigate.     Musculoskeletal:         General: No tenderness or deformity.      Cervical back: Normal range of motion and neck supple.   Lymphadenopathy:      Cervical: No cervical adenopathy.   Skin:     General: Skin is warm and dry.      Capillary Refill: Capillary refill takes less than 2 seconds.      Coloration: Skin is not pale.      Findings: No erythema or rash.   Neurological:      Mental Status: He is alert and oriented to person, place, and time.      Cranial Nerves: No cranial nerve deficit.      Sensory: No sensory deficit.      Motor: No abnormal muscle tone.      Coordination: Coordination normal.      Deep Tendon Reflexes: Reflexes normal.   Psychiatric:         Behavior: Behavior normal.         Thought Content: Thought content normal.         Judgment:  Judgment normal.       FORMAL DIAGNOSTIC RESULTS     RADIOLOGY: Interpretation per the Radiologist below, if available at the time of this note (none if blank): No orders to display       LABS: (none if blank)  Results for orders placed or performed during the hospital encounter of 02/27/23   Urine with Reflexed Micro   Result Value Ref Range    Glucose, Ur NEGATIVE NEGATIVE mg/dl    Bilirubin Urine NEGATIVE NEGATIVE    Ketones, Urine NEGATIVE NEGATIVE    Specific Gravity, Urine 1.007 1.002 - 1.030    Blood, Urine TRACE (A) NEGATIVE    pH, UA 7.5 5.0 - 9.0    Protein, UA TRACE (A) NEGATIVE    Urobilinogen, Urine 0.2 0.0 - 1.0 eu/dl    Nitrite, Urine POSITIVE (A) NEGATIVE    Leukocyte Esterase, Urine LARGE (A) NEGATIVE    Color, UA YELLOW STRAW-YELLOW    Character, Urine CLEAR CLEAR-SL CLOUD    RBC, UA 3-5 0-2/hpf /hpf    WBC, UA  0-4/hpf /hpf    Epithelial Cells, UA NONE SEEN 3-5/hpf /hpf    Bacteria, UA MODERATE FEW/NONE SEEN /hpf    Casts UA NONE SEEN NONE SEEN /lpf    Crystals, UA NONE SEEN NONE SEEN    Renal Epithelial, UA NONE SEEN NONE SEEN    Yeast, UA NONE SEEN NONE SEEN    CASTS 2 NONE SEEN NONE SEEN /lpf    MISCELLANEOUS 2 NONE SEEN        (Any cultures that may have been sent were not resulted at the time of this patient visit)    [facility] and ED COURSE:     [facility] Summary:     POCUS ultrasound shows 500 mL of urine. Garduno catheter was unable to be irrigated. Garduno catheter was deflated, removed, Garduno was clogged with mucus. A new 18 Fr Garduno was inserted without difficulty. UA is sent. Discharged with Cipro prescribed. Followed by urology as scheduled. ED Course as of 02/28/23 0129   Mon Feb 27, 2023   2100 POCUS ultrasound shows 500 ml of urine. Unable to irrigate Garduno catheter.  [ZEKE]      ED Course User Index  [ZEKE] Santhosh Ospina MD       Vitals:    02/27/23 1958   BP: (!) 154/89   Pulse: 77   Resp: 16   Temp: 97.8 °F (36.6 °C)   TempSrc: Oral   SpO2: 94%   Weight: 165 lb (74.8 kg)   Height: 6' (1.829 m)       1) Number and Complexity of Problems        Problem List This Visit:   Urinary Catheter      Differential Diagnosis includes (but not limited to):   Clogged Garduno catheter, UTI    Diagnoses Considered but I have low suspicion of:   Device failure        Pertinent Comorbid Conditions:    See HPI, PMH and PSH    2)  Data Reviewed (none if left blank)    My Independent interpretations:       EKG:   None    External Documentation Reviewed:   Care everywhere in [facility] is reviewed. Previous patient encounter documents & history available on EMR was reviewed:   Yes    See Formal Diagnostic Results above for the lab and radiology tests and orders. 3)  Treatment and Disposition    ED Reassessment:  Stable ED stay    Case discussed with consulting clinician:    None    Shared Decision-Making:   Treatment plan and disposition discussed with the patient/family, questions answered     Code Status:    Reviewed with patient and/or family as Full code    ED Medications administered this visit:  (None if blank)  Medications - No data to display    PROCEDURES:   None     CRITICAL CARE:   None    FINAL IMPRESSION      1. Problem with Garduno catheter, initial encounter ([facility] [address]    2.  Urinary tract infection in male          DISPOSITION/PLAN   DISPOSITION Decision To Discharge 02/27/2023 09:30:18 PM      OUTPATIENT FOLLOW UP THE PATIENT:  [address] [facility]  [facility] [id number]    As scheduled  DISCHARGE MEDICATIONS:(None if blank)  Discharge Medication List as of 2/27/2023  9:36 PM        START taking these medications    Details   ciprofloxacin (CIPRO) 500 MG tablet Take 1 tablet by mouth 2 times daily for 7 days, Disp-14 tablet, R-0Normal               MD Manny Miller MD  02/28/23 0865
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
## 2023-03-01 ENCOUNTER — TELEPHONE (OUTPATIENT)
Dept: ENT CLINIC | Age: 88
End: 2023-03-01

## 2023-03-01 DIAGNOSIS — H91.90 HEARING LOSS, UNSPECIFIED HEARING LOSS TYPE, UNSPECIFIED LATERALITY: Primary | ICD-10-CM

## 2023-03-01 NOTE — TELEPHONE ENCOUNTER
He ultimately needs a hearing test, but sounds like he has wax that needs cleaned first. I can see 8:30AM on 3/24/23
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
## 2023-03-02 LAB
BACTERIA UR CULT: ABNORMAL
BACTERIA UR CULT: ABNORMAL
ORGANISM: ABNORMAL
ORGANISM: ABNORMAL

## 2023-03-03 ENCOUNTER — TELEPHONE (OUTPATIENT)
Dept: PHARMACY | Age: 88
End: 2023-03-03

## 2023-03-03 NOTE — TELEPHONE ENCOUNTER
Pharmacy Note  ED Culture Follow-up    Rebekah Vázquez is a 80 y.o. male. Allergies: Patient has no known allergies. Labs:  Lab Results   Component Value Date    BUN 12 01/17/2023    CREATININE 0.9 01/17/2023    WBC 8.2 01/17/2023     Estimated Creatinine Clearance: 63 mL/min (based on SCr of 0.9 mg/dL). Current antimicrobials:   Ciprofloxacin    ASSESSMENT:  Micro results:   Urine culture: positive for two strains of Proteus mirabilis     PLAN:  Need for intervention: Yes  Discussed with: Gricel Espinoza PA-C  Chosen treatment:    Stop Ciprofloxacin and start amoxicillin 875 mg BID x 7 days    Patient response:   Called and spoke with patient. He followed up with his PCP, Dr. Carrie Gómez, yesterday and was told to stop Ciprofloxacin. He requested instead of calling in antibiotics to fax the results to Dr. Nona Kimball office. Called/sent in prescription to: Not applicable    Please call with any questions.  [address] [place]    Cate Son, [facility], PharmD 4:05 PM 3/3/2023

## 2023-03-10 ENCOUNTER — NURSE ONLY (OUTPATIENT)
Dept: UROLOGY | Age: 88
End: 2023-03-10
Payer: MEDICARE

## 2023-03-10 DIAGNOSIS — R33.8 ACUTE URINARY RETENTION: Primary | ICD-10-CM

## 2023-03-10 PROCEDURE — 51702 INSERT TEMP BLADDER CATH: CPT | Performed by: UROLOGY

## 2023-03-10 NOTE — PROGRESS NOTES
Patient has given me verbal consent to perform catheter change Yes    Does patient have latex allergy? No  Does patient have shellfish or betadine allergy? No      Following Dr. Jasvir Palomares plan of care. 18 Fr Catheter changed without difficulty. Once balloon was deflated, removed catheter without difficulty. Cleansed urethral opening with betadine swab. 18 Fr regular ortega was inserted using sterile water-soluble lubricant without difficulty. Catheter was flushed with 35cc water ensuring return and inflated balloon with 10 ml of water. Ortega Catheter was hooked up to leg bag with straps. Foreskin reduced back down? N/a    Patient instructed on how to drain catheter bags. If patient was given a leg bag, patient was instructed to keep bag above the knee to prevent pulling on catheter. Pt notified if catheter is pulled on may cause pain and bleeding. Patient was also instructed on how to switch from leg bag to overnight bag.           Supplies were provided by patient

## 2023-03-14 ENCOUNTER — TELEPHONE (OUTPATIENT)
Dept: INTERNAL MEDICINE CLINIC | Age: 88
End: 2023-03-14

## 2023-03-14 NOTE — TELEPHONE ENCOUNTER
Pt was notified to try metamucil and also Dr Jomar Antony is recommending a referral to [facility]. Pt would like to try the Metamucil first to see how it goes. He will call us back an update.
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
## 2023-03-21 ENCOUNTER — HOSPITAL ENCOUNTER (EMERGENCY)
Age: 88
Discharge: HOME | End: 2023-03-21
Attending: STUDENT IN AN ORGANIZED HEALTH CARE EDUCATION/TRAINING PROGRAM
Payer: MEDICARE

## 2023-03-21 VITALS
DIASTOLIC BLOOD PRESSURE: 71 MMHG | SYSTOLIC BLOOD PRESSURE: 138 MMHG | HEART RATE: 67 BPM | TEMPERATURE: 98.4 F | BODY MASS INDEX: 22.37 KG/M2 | RESPIRATION RATE: 18 BRPM | OXYGEN SATURATION: 93 % | WEIGHT: 165 LBS

## 2023-03-21 DIAGNOSIS — T83.9XXA PROBLEM WITH FOLEY CATHETER, INITIAL ENCOUNTER (HCC): Primary | ICD-10-CM

## 2023-03-21 DIAGNOSIS — R82.81 PYURIA: ICD-10-CM

## 2023-03-21 LAB
BACTERIA URNS QL MICRO: ABNORMAL /HPF
BILIRUB UR QL STRIP.AUTO: NEGATIVE
CASTS #/AREA URNS LPF: ABNORMAL /LPF
CASTS 2: ABNORMAL /LPF
CHARACTER UR: CLEAR
COLOR: YELLOW
CRYSTALS URNS MICRO: ABNORMAL
EPITHELIAL CELLS, UA: ABNORMAL /HPF
GLUCOSE UR QL STRIP.AUTO: NEGATIVE MG/DL
HGB UR QL STRIP.AUTO: ABNORMAL
KETONES UR QL STRIP.AUTO: NEGATIVE
MISCELLANEOUS 2: ABNORMAL
NITRITE UR QL STRIP: NEGATIVE
PH UR STRIP.AUTO: 7 [PH] (ref 5–9)
PROT UR STRIP.AUTO-MCNC: NEGATIVE MG/DL
RBC URINE: ABNORMAL /HPF
RENAL EPI CELLS #/AREA URNS HPF: ABNORMAL /[HPF]
SP GR UR REFRACT.AUTO: < 1.005 (ref 1–1.03)
UROBILINOGEN, URINE: 0.2 EU/DL (ref 0–1)
WBC #/AREA URNS HPF: ABNORMAL /HPF
WBC #/AREA URNS HPF: ABNORMAL /[HPF]
YEAST LIKE FUNGI URNS QL MICRO: ABNORMAL

## 2023-03-21 PROCEDURE — 51702 INSERT TEMP BLADDER CATH: CPT

## 2023-03-21 PROCEDURE — 51798 US URINE CAPACITY MEASURE: CPT

## 2023-03-21 PROCEDURE — 87186 SC STD MICRODIL/AGAR DIL: CPT

## 2023-03-21 PROCEDURE — 81001 URINALYSIS AUTO W/SCOPE: CPT

## 2023-03-21 PROCEDURE — 87077 CULTURE AEROBIC IDENTIFY: CPT

## 2023-03-21 PROCEDURE — 99283 EMERGENCY DEPT VISIT LOW MDM: CPT

## 2023-03-21 PROCEDURE — 81003 URINALYSIS AUTO W/O SCOPE: CPT

## 2023-03-21 PROCEDURE — 87086 URINE CULTURE/COLONY COUNT: CPT

## 2023-03-21 RX ORDER — SULFAMETHOXAZOLE AND TRIMETHOPRIM 800; 160 MG/1; MG/1
1 TABLET ORAL 2 TIMES DAILY
Qty: 20 TABLET | Refills: 0 | Status: SHIPPED | OUTPATIENT
Start: 2023-03-21 | End: 2023-03-31

## 2023-03-21 ASSESSMENT — PAIN - FUNCTIONAL ASSESSMENT: PAIN_FUNCTIONAL_ASSESSMENT: NONE - DENIES PAIN

## 2023-03-22 ENCOUNTER — NURSE ONLY (OUTPATIENT)
Dept: LAB | Age: 88
End: 2023-03-22

## 2023-03-22 ENCOUNTER — TELEPHONE (OUTPATIENT)
Dept: INTERNAL MEDICINE CLINIC | Age: 88
End: 2023-03-22

## 2023-03-22 DIAGNOSIS — I10 PRIMARY HYPERTENSION: ICD-10-CM

## 2023-03-22 DIAGNOSIS — I10 PRIMARY HYPERTENSION: Primary | ICD-10-CM

## 2023-03-22 LAB
ANION GAP SERPL CALC-SCNC: 12 MEQ/L (ref 8–16)
BUN SERPL-MCNC: 20 MG/DL (ref 7–22)
CALCIUM SERPL-MCNC: 9.5 MG/DL (ref 8.5–10.5)
CHLORIDE SERPL-SCNC: 105 MEQ/L (ref 98–111)
CO2 SERPL-SCNC: 26 MEQ/L (ref 23–33)
CREAT SERPL-MCNC: 0.9 MG/DL (ref 0.4–1.2)
GFR SERPL CREATININE-BSD FRML MDRD: > 60 ML/MIN/1.73M2
GLUCOSE SERPL-MCNC: 108 MG/DL (ref 70–108)
POTASSIUM SERPL-SCNC: 4 MEQ/L (ref 3.5–5.2)
SODIUM SERPL-SCNC: 143 MEQ/L (ref 135–145)

## 2023-03-22 NOTE — ED NOTES
Unable to manually irrigate Garduno.  Urine is noted to be draining into leg bag but water will not flush in.     Marie Ni RN  03/21/23 0887

## 2023-03-22 NOTE — ED TRIAGE NOTES
Pt presents to ED via triage for c/o ortega catheter clogged. Pt reports having a chronic ortega that gets exchanged every 2 weeks by Urology. Pt reports having problems with the ortega ever since it was inserted approx 1.5 years ago. Pt reports self irrigating nightly and reports \"tonight, I couldn't put anything in. And if I can't put anything in, I know nothing is coming out. \" Pt reports emptying leg bag at approx 1915 and reports with at least 150mL of urine in bag. Bladder scan performed showing 103mL. Pt denies any pain at this time. Updated pt and wife on POC. Will monitor.

## 2023-03-22 NOTE — ED NOTES
Drainage bag emptied at this time. Pt and SO updated on plan of care. Voiced no needs. Call light in reach.      Laureen Ross RN  03/21/23 4503

## 2023-03-22 NOTE — ED PROVIDER NOTES
Signed out to me at shift change 9:45 PM.   This patient has been seen and evaluated by previous shift physician, Dr. Malgorzata Springer. Please refer to his/her note for detailed history of present illness, physical exam and medical decision making. I was signed out to follow up cardiac rhythm. I have personally seen and evaluated this patient at time of sign-out. ED COURSE / MEDICAL DECISION MAKING:       Patient presents for evaluation of Garduno catheter stopping draining. During Garduno catheter irrigation, patient became transiently bradycardic. Patient then was observed for one hour in ED. Patient was asymptomatic. Discharged as planned. UA shows pyuria, previous urine culture showed Proteus mirabilis on 2/27/2023, sensitive to Bactrim. Discharged with Bactrim prescribed. Urology follow-up next week.     VITALS  Vitals:    03/21/23 2009 03/21/23 2115 03/21/23 2215   BP: (!) 174/94 (!) 151/84 (!) 149/73   Pulse: 85 93 76   Resp: 18 17 14   Temp:  98.4 °F (36.9 °C)    TempSrc:  Oral    SpO2: 96% 97% 95%   Weight: 165 lb (74.8 kg)           LABS  Results for orders placed or performed during the hospital encounter of 03/21/23   Urine with Reflexed Micro   Result Value Ref Range    Glucose, Ur NEGATIVE NEGATIVE mg/dl    Bilirubin Urine NEGATIVE NEGATIVE    Ketones, Urine NEGATIVE NEGATIVE    Specific Gravity, Urine < 1.005 1.002 - 1.030    Blood, Urine TRACE (A) NEGATIVE    pH, UA 7.0 5.0 - 9.0    Protein, UA NEGATIVE NEGATIVE    Urobilinogen, Urine 0.2 0.0 - 1.0 eu/dl    Nitrite, Urine NEGATIVE NEGATIVE    Leukocyte Esterase, Urine LARGE (A) NEGATIVE    Color, UA YELLOW STRAW-YELLOW    Character, Urine CLEAR CLEAR-SL CLOUD    RBC, UA 0-2 0-2/hpf /hpf    WBC, UA 15-25 0-4/hpf /hpf    Epithelial Cells, UA NONE SEEN 3-5/hpf /hpf    Bacteria, UA FEW FEW/NONE SEEN /hpf    Casts UA NONE SEEN NONE SEEN /lpf    Crystals, UA NONE SEEN NONE SEEN    Renal Epithelial, UA NONE SEEN NONE SEEN    Yeast, UA NONE SEEN NONE SEEN

## 2023-03-22 NOTE — ED NOTES
ED nurse-to-nurse bedside report    Chief Complaint   Patient presents with    Other     Garduno Catheter plugged      LOC: alert and orientated to name, place, date  Vital signs   Vitals:    03/21/23 2009 03/21/23 2115   BP: (!) 174/94    Pulse: 85    Resp: 18    Temp:  98.4 °F (36.9 °C)   TempSrc:  Oral   SpO2: 96%    Weight: 165 lb (74.8 kg)       Pain:    Pain Interventions: NA  Pain Goal: 0  Oxygen: No    Current needs required Room Air   Telemetry: No  LDAs:    Continuous Infusions:   Mobility: Independent  Valencia Fall Risk Score: Fall Risk 5/16/2022 3/4/2021 1/28/2020 6/6/2018 5/11/2017 5/3/2016   2 or more falls in past year? no no no no no no   Fall with injury in past year?  yes no no no no no     Fall Interventions: Side rails, call light within reach  Report given to: Clotilde Whitlock RN  03/21/23 2116

## 2023-03-22 NOTE — TELEPHONE ENCOUNTER
Pt was notified to not take the bactrim but get a BMP done.   Pt will go to the new [facility] lab on [address]

## 2023-03-22 NOTE — ED PROVIDER NOTES
[facility] [address] [address] EMERGENCY DEPT      EMERGENCY MEDICINE     Pt Name: Rebekah Vázquez  MRN: [id number]  [place] 1935  Date of evaluation: 3/21/2023  Provider: Eden Reynoso MD    CHIEF COMPLAINT       Chief Complaint   Patient presents with    Other     Ortega Catheter plugged     HISTORY OF PRESENT ILLNESS   Rebekah Vázquez is a pleasant 80 y.o. male who presents to the emergency department for evaluation of ortega catheter problem. Patient states that when he went to self irrigate it tonight like he always does at night he was unable to irrigate his Ortega catheter. He states that when he has been unable to irrigate in the past he also does not have urine coming out of it and it requires changing. Catheter was last changed approximately 2 weeks ago. He has appointment to have it changed in 2 days. He denies any systemic symptoms including chest pain, shortness of breath, fever/chills, nausea or vomiting, dysuria or suprapubic pain. He denies any blood in his urine.     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

## 2023-03-22 NOTE — TELEPHONE ENCOUNTER
Pt reports he went to ER last night for a clogged catheter. They told him it was plugged with a green mass of some sort. They gave him a script for Bactrim and pt is wondering if Dr Andi Torres thinks he should take it?

## 2023-03-22 NOTE — ED NOTES
Pt and SO aware of pending discharge. Leg bag for Garduno provided per request. Voiced no further needs.      Kirt Umanzor RN  03/21/23 6671

## 2023-03-23 ENCOUNTER — TELEPHONE (OUTPATIENT)
Dept: INTERNAL MEDICINE CLINIC | Age: 88
End: 2023-03-23

## 2023-03-23 LAB
BACTERIA UR CULT: ABNORMAL
ORGANISM: ABNORMAL

## 2023-03-24 NOTE — PROGRESS NOTES
Pharmacy Note  ED Culture Follow-up    Jade Antony is a 80 y.o. male. Allergies: Patient has no known allergies. Current antimicrobials:   Reviewed patient's urine culture - culture positive for Proteus mirabilis. Patient was discharged on Bactrim DS BID x 10 days, and culture is sensitive to prescribed medication. Antibiotic prescribed at discharge is appropriate - no changes made to antibiotic regimen. Please call with any questions.  Vivek Aggarwal, [facility], PharmD 2:37 PM 3/24/2023

## 2023-03-29 ENCOUNTER — NURSE ONLY (OUTPATIENT)
Dept: UROLOGY | Age: 88
End: 2023-03-29
Payer: MEDICARE

## 2023-03-29 DIAGNOSIS — R33.8 ACUTE URINARY RETENTION: Primary | ICD-10-CM

## 2023-03-29 PROCEDURE — 51702 INSERT TEMP BLADDER CATH: CPT | Performed by: NURSE PRACTITIONER

## 2023-03-29 NOTE — PROGRESS NOTES
While [place], [place] was irrigating patient's catheter during a ortega catheter change, this nurse palpated the radial pulse d/t hospital notes from 3/21/23 stating patient had a bradycardiac episode during irrigation. A 68 bpm irregular pulse was noted prior to irrigation and during irrigation pulse dropped down to 50 bpm irregular. Pulse bailey back up to 64 bpm irregular following irrigation.

## 2023-03-29 NOTE — PROGRESS NOTES
Patient has given me verbal consent to perform catheter change Yes    Does patient have latex allergy? No  Does patient have shellfish or betadine allergy? No      Following Dr. Annmarie Boone plan of care. 18 Fr Catheter changed without difficulty. Once balloon was deflated, removed catheter without difficulty. Cleansed urethral opening with betadine swab. 18 Fr regular ortega was inserted using sterile water-soluble lubricant without difficulty. Catheter was flushed with 35cc water ensuring return and inflated balloon with 10 ml of water. Ortega Catheter was hooked up to tubing then leg bag with straps. Foreskin reduced back down? Yes    Patient instructed on how to drain catheter bags. If patient was given a leg bag, patient was instructed to keep bag above the knee to prevent pulling on catheter. Pt notified if catheter is pulled on may cause pain and bleeding. Patient was also instructed on how to switch from leg bag to overnight bag. Supplies were provided by patient. Office supplied leg bag     Lilibeth Tavares LPN checked pulse during irrigation.

## 2023-04-03 ENCOUNTER — OFFICE VISIT (OUTPATIENT)
Dept: ENT CLINIC | Age: 88
End: 2023-04-03
Payer: MEDICARE

## 2023-04-03 VITALS
SYSTOLIC BLOOD PRESSURE: 128 MMHG | TEMPERATURE: 97.6 F | OXYGEN SATURATION: 96 % | HEART RATE: 66 BPM | DIASTOLIC BLOOD PRESSURE: 66 MMHG | WEIGHT: 165 LBS | BODY MASS INDEX: 22.35 KG/M2 | HEIGHT: 72 IN

## 2023-04-03 DIAGNOSIS — H93.8X2 PLUGGED FEELING IN EAR, LEFT: ICD-10-CM

## 2023-04-03 DIAGNOSIS — H61.23 HEARING LOSS OF BOTH EARS DUE TO CERUMEN IMPACTION: Primary | ICD-10-CM

## 2023-04-03 PROCEDURE — 1036F TOBACCO NON-USER: CPT | Performed by: PHYSICIAN ASSISTANT

## 2023-04-03 PROCEDURE — 99203 OFFICE O/P NEW LOW 30 MIN: CPT | Performed by: PHYSICIAN ASSISTANT

## 2023-04-03 PROCEDURE — G8427 DOCREV CUR MEDS BY ELIG CLIN: HCPCS | Performed by: PHYSICIAN ASSISTANT

## 2023-04-03 PROCEDURE — 69210 REMOVE IMPACTED EAR WAX UNI: CPT | Performed by: PHYSICIAN ASSISTANT

## 2023-04-03 PROCEDURE — G8420 CALC BMI NORM PARAMETERS: HCPCS | Performed by: PHYSICIAN ASSISTANT

## 2023-04-03 PROCEDURE — 1123F ACP DISCUSS/DSCN MKR DOCD: CPT | Performed by: PHYSICIAN ASSISTANT
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
## 2023-04-05 ENCOUNTER — OFFICE VISIT (OUTPATIENT)
Dept: UROLOGY | Age: 88
End: 2023-04-05
Payer: MEDICARE

## 2023-04-05 VITALS — WEIGHT: 165 LBS | BODY MASS INDEX: 22.35 KG/M2 | RESPIRATION RATE: 18 BRPM | HEIGHT: 72 IN

## 2023-04-05 DIAGNOSIS — N35.916 STRICTURE OF OVERLAPPING SITES OF URETHRA IN MALE, UNSPECIFIED STRICTURE TYPE: ICD-10-CM

## 2023-04-05 DIAGNOSIS — N39.0 RECURRENT UTI: ICD-10-CM

## 2023-04-05 DIAGNOSIS — R33.8 ACUTE URINARY RETENTION: Primary | ICD-10-CM

## 2023-04-05 PROCEDURE — 1036F TOBACCO NON-USER: CPT | Performed by: UROLOGY

## 2023-04-05 PROCEDURE — G8420 CALC BMI NORM PARAMETERS: HCPCS | Performed by: UROLOGY

## 2023-04-05 PROCEDURE — G8427 DOCREV CUR MEDS BY ELIG CLIN: HCPCS | Performed by: UROLOGY

## 2023-04-05 PROCEDURE — 51702 INSERT TEMP BLADDER CATH: CPT | Performed by: UROLOGY

## 2023-04-05 PROCEDURE — 1123F ACP DISCUSS/DSCN MKR DOCD: CPT | Performed by: UROLOGY

## 2023-04-05 PROCEDURE — 99214 OFFICE O/P EST MOD 30 MIN: CPT | Performed by: UROLOGY

## 2023-04-05 NOTE — PROGRESS NOTES
Dr. Zulma Benites MD  [facility])  Urology Clinic      Patient:  Nicolette Russ  YOB: 1935  Date: 4/5/2023    HISTORY OF PRESENT ILLNESS:   The patient is a 80 y.o. male who presents today for follow-up for the following problem(s): Urinary retention w ortega. Ortega is getting clogged every 12 days or so. Will schedule for 3x monthly exchanges. Overall the problem(s) : are worsening. Associated Symptoms: No dysuria, gross hematuria. Pain Severity: 0/10    Summary of old records: Ortega clogged every 10-12 days. Imaging/Labs reviewed during today's visit:  I have independently reviewed and verified the following films during today's visit. None    Last several PSA's:  Lab Results   Component Value Date    PSA <0.02 10/13/2022    PSA <0.02 05/08/2015       Last total testosterone:  No results found for: TESTOSTERONE    Urinalysis today:  No results found for this visit on 04/05/23.     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

## 2023-04-05 NOTE — PROGRESS NOTES
Patient has given me verbal consent to perform catheter change Yes    Does patient have latex allergy? No  Does patient have shellfish or betadine allergy? No      Following Dr. Niko Goel plan of care. 16 Fr Catheter changed without difficulty. Once balloon was deflated, removed catheter without difficulty. Cleansed urethral opening with betadine swab. 16 Fr regular ortega was inserted using sterile water-soluble lubricant without difficulty. Catheter was flushed with 35 cc water ensuring return and inflated balloon with 10 ml of water. Ortega Catheter was hooked up to leg bag with straps. Foreskin reduced back down? Yes    Patient instructed on how to drain catheter bags. If patient was given a leg bag, patient was instructed to keep bag above the knee to prevent pulling on catheter. Pt notified if catheter is pulled on may cause pain and bleeding. Patient was also instructed on how to switch from leg bag to overnight bag.           Supplies were provided by patient

## 2023-04-06 ENCOUNTER — CARE COORDINATION (OUTPATIENT)
Dept: CARE COORDINATION | Age: 88
End: 2023-04-06

## 2023-04-06 NOTE — CARE COORDINATION
Attempted to reach Ronak Spivey today for enrollment in care coordination. No answer. Message left to return call.

## 2023-04-17 ENCOUNTER — NURSE ONLY (OUTPATIENT)
Dept: UROLOGY | Age: 88
End: 2023-04-17
Payer: MEDICARE

## 2023-04-17 DIAGNOSIS — R33.8 ACUTE URINARY RETENTION: Primary | ICD-10-CM

## 2023-04-17 PROCEDURE — 51702 INSERT TEMP BLADDER CATH: CPT | Performed by: NURSE PRACTITIONER

## 2023-04-17 NOTE — PROGRESS NOTES
Patient has given me verbal consent to perform catheter change Yes    Does patient have latex allergy? No  Does patient have shellfish or betadine allergy? No      Following Dr. Philippe Mancilla CNP plan of care. 18 Fr Catheter changed without difficulty. Once balloon was deflated, removed catheter without difficulty. Cleansed urethral opening with betadine swab. 18 Fr regular ortega was inserted using sterile water-soluble lubricant without difficulty. Catheter was flushed with 35cc water ensuring return and inflated balloon with 10 ml of water. Ortega Catheter was hooked up to leg bag with straps. Foreskin reduced back down? Yes    Patient instructed on how to drain catheter bags. If patient was given a leg bag, patient was instructed to keep bag above the knee to prevent pulling on catheter. Pt notified if catheter is pulled on may cause pain and bleeding. Patient was also instructed on how to switch from leg bag to overnight bag.           Supplies were provided by patient

## 2023-04-19 ENCOUNTER — TELEPHONE (OUTPATIENT)
Dept: UROLOGY | Age: 88
End: 2023-04-19

## 2023-04-19 NOTE — TELEPHONE ENCOUNTER
When patient came in to have ortega catheter changed on 4/17/23 him and his wife spoke about changing catheter at home if it would get clogged instead of going to the ER or if he should have his catheter upsize. I told patient I would speak with Dr Nohemy Solis when he was in clinic. Per Dr Francis Becker catheter to 20 Fr and change every 3-4 weeks if catheter is draining well, if patient needs to have changed sooner than every 3-4 weeks, please talk with Dr Nohemy Solis for next step.

## 2023-04-21 ENCOUNTER — CARE COORDINATION (OUTPATIENT)
Dept: CARE COORDINATION | Age: 88
End: 2023-04-21

## 2023-04-27 ENCOUNTER — CARE COORDINATION (OUTPATIENT)
Dept: CARE COORDINATION | Age: 88
End: 2023-04-27

## 2023-04-27 ENCOUNTER — NURSE ONLY (OUTPATIENT)
Dept: UROLOGY | Age: 88
End: 2023-04-27
Payer: MEDICARE

## 2023-04-27 DIAGNOSIS — R33.8 ACUTE URINARY RETENTION: Primary | ICD-10-CM

## 2023-04-27 PROCEDURE — 51702 INSERT TEMP BLADDER CATH: CPT | Performed by: NURSE PRACTITIONER

## 2023-04-27 NOTE — PROGRESS NOTES
Patient has given me verbal consent to perform catheter change Yes    Does patient have latex allergy? No  Does patient have shellfish or betadine allergy? No      Following Dr. Tobin Sampson CNP plan of care. 20 Fr Catheter changed with slight difficulty since the catheter was upsized. Once balloon was deflated, removed catheter without difficulty. Cleansed urethral opening with betadine swab. 20 Fr regular ortega was inserted using sterile water-soluble lubricant without difficulty. Catheter was flushed with 70cc water ensuring return and inflated balloon with 10 ml of water. Ortega Catheter was hooked up to leg bag with straps. Foreskin reduced back down? N/A    Patient instructed on how to drain catheter bags. If patient was given a leg bag, patient was instructed to keep bag above the knee to prevent pulling on catheter. Pt notified if catheter is pulled on may cause pain and bleeding. Patient was also instructed on how to switch from leg bag to overnight bag.           Supplies were provided by patient 16 Fr exchange with 20 Fr

## 2023-04-27 NOTE — CARE COORDINATION
Attempted to reach [place] today for f/u. No answer. Pt had urology appt for catheter exchange today. Will attempt to f/u with pt next wk.

## 2023-05-02 ENCOUNTER — CARE COORDINATION (OUTPATIENT)
Dept: CARE COORDINATION | Age: 88
End: 2023-05-02

## 2023-05-02 NOTE — ACP (ADVANCE CARE PLANNING)
Advance Care Planning   Healthcare Decision Maker:    Primary Decision Maker: Dora Solorio - Spouse - [phone]    Click here to complete Healthcare Decision Makers including selection of the Healthcare Decision Maker Relationship (ie \"Primary\"). Today we documented Decision Maker(s) consistent with Legal Next of Kin hierarchy. Pt has ACP documents. Encouraged to bring copy to office.      If the relationship to the patient does NOT follow our state's Next of Kin hierarchy, the patient MUST complete an ACP Document to allow him/her to act on the patient's behalf. :

## 2023-05-02 NOTE — CARE COORDINATION
Ambulatory Care Coordination Note  2023    Patient Current Location:  Home: [facility] [address]  [facility] [place] [facility] [address]     ACM contacted the patient by telephone. Verified name and  with patient as identifiers. Provided introduction to self, and explanation of the ACM role. Challenges to be reviewed by the provider   Additional needs identified to be addressed with provider: No  none               Method of communication with provider: none. ACM: Evie Hilario RN    Aniyah Latham is being followed by care coordination for education and assistance in managing his chronic conditions and health care needs. Pt has h/o: HTN, HLD, prostate ca, urinary retention. Spoke with Aniyah Latham today. Pt had recent ortega catheter exchange. Size increased. So far pt reports that he has not had any issues with catheter. Urine clear yellow. Pt monitors BP at home. Discussed RPM program.  Will think about it but not interested at this time. Has appt with Dr. Michaela Borges in July  Discussed ACP documents. Pt has documents on file at home. Encouraged to bring a copy of them to next PCP appt so they can be scanned in to chart. Offered patient enrollment in the Remote Patient Monitoring (RPM) program for in-home monitoring: Patient declined. Plan of care:  Provide copy of ACP documents to office  Monitor urine output.   Continue drinking good amts of water  Continue staying active  F/u with PCP as scheduled  F/u with urology for catheter exchange  or sooner if problems develop  General Assessment             Lab Results       None            Care Coordination Interventions    Referral from Primary Care Provider: No  Suggested Interventions and Community Resources          Goals Addressed    None         Future Appointments   Date Time Provider Silvio Fowler   2023  1:45 PM Dominiquea Press, [address]   2023  1:00 PM Brittany Pond MD [facility] Physic [address] [facility]

## 2023-05-11 ENCOUNTER — HOSPITAL ENCOUNTER (EMERGENCY)
Age: 88
Discharge: HOME | End: 2023-05-11
Attending: EMERGENCY MEDICINE
Payer: MEDICARE

## 2023-05-11 VITALS
TEMPERATURE: 98.1 F | WEIGHT: 162 LBS | SYSTOLIC BLOOD PRESSURE: 139 MMHG | RESPIRATION RATE: 19 BRPM | BODY MASS INDEX: 21.97 KG/M2 | HEART RATE: 93 BPM | OXYGEN SATURATION: 95 % | DIASTOLIC BLOOD PRESSURE: 62 MMHG

## 2023-05-11 DIAGNOSIS — T83.511A URINARY TRACT INFECTION ASSOCIATED WITH INDWELLING URETHRAL CATHETER, INITIAL ENCOUNTER (HCC): ICD-10-CM

## 2023-05-11 DIAGNOSIS — T83.9XXA PROBLEM WITH FOLEY CATHETER, INITIAL ENCOUNTER (HCC): Primary | ICD-10-CM

## 2023-05-11 DIAGNOSIS — N39.0 URINARY TRACT INFECTION ASSOCIATED WITH INDWELLING URETHRAL CATHETER, INITIAL ENCOUNTER (HCC): ICD-10-CM

## 2023-05-11 LAB
BACTERIA URNS QL MICRO: ABNORMAL /HPF
BILIRUB UR QL STRIP.AUTO: NEGATIVE
CASTS #/AREA URNS LPF: ABNORMAL /LPF
CASTS 2: ABNORMAL /LPF
CHARACTER UR: ABNORMAL
COLOR: YELLOW
CRYSTALS URNS MICRO: ABNORMAL
EPITHELIAL CELLS, UA: ABNORMAL /HPF
GLUCOSE UR QL STRIP.AUTO: NEGATIVE MG/DL
HGB UR QL STRIP.AUTO: ABNORMAL
KETONES UR QL STRIP.AUTO: NEGATIVE
MISCELLANEOUS 2: ABNORMAL
NITRITE UR QL STRIP: POSITIVE
PH UR STRIP.AUTO: 6.5 [PH] (ref 5–9)
PROT UR STRIP.AUTO-MCNC: 100 MG/DL
RBC URINE: ABNORMAL /HPF
RENAL EPI CELLS #/AREA URNS HPF: ABNORMAL /[HPF]
SP GR UR REFRACT.AUTO: 1.02 (ref 1–1.03)
UROBILINOGEN, URINE: 0.2 EU/DL (ref 0–1)
WBC #/AREA URNS HPF: > 200 /HPF
WBC #/AREA URNS HPF: ABNORMAL /[HPF]
YEAST LIKE FUNGI URNS QL MICRO: ABNORMAL

## 2023-05-11 PROCEDURE — 99283 EMERGENCY DEPT VISIT LOW MDM: CPT

## 2023-05-11 PROCEDURE — 87086 URINE CULTURE/COLONY COUNT: CPT

## 2023-05-11 PROCEDURE — 81001 URINALYSIS AUTO W/SCOPE: CPT

## 2023-05-11 PROCEDURE — 87186 SC STD MICRODIL/AGAR DIL: CPT

## 2023-05-11 PROCEDURE — 87077 CULTURE AEROBIC IDENTIFY: CPT

## 2023-05-11 RX ORDER — SULFAMETHOXAZOLE AND TRIMETHOPRIM 800; 160 MG/1; MG/1
1 TABLET ORAL 2 TIMES DAILY
Qty: 20 TABLET | Refills: 0 | Status: SHIPPED | OUTPATIENT
Start: 2023-05-11 | End: 2023-05-21

## 2023-05-11 ASSESSMENT — PAIN - FUNCTIONAL ASSESSMENT: PAIN_FUNCTIONAL_ASSESSMENT: NONE - DENIES PAIN

## 2023-05-11 NOTE — DISCHARGE INSTRUCTIONS
Take the full course of antibiotics  Call and follow-up with your urologist guarding your infections and catheter issues  Follow-up with your family doctor within a week

## 2023-05-11 NOTE — ED PROVIDER NOTES
4/5/2023 for urology office visit regarding acute urinary retention . MEDICAL DECISION MAKING/ED COURSE   Initial Assessment:     80 y.o. male presenting to the ED for catheter problem    Differential diagnoses include but not limited to: Catheter obstruction stricture blood clot UTI     Plan:       Bladder scan  UA  Garduno replacement  Discharge      Brief Summary of Patient Presentation:    Patient is a 80 y.o. male with a past medical history of hypertension hyperlipidemia prostate cancer who was seen and evaluated in the emergency department for catheter problem. Patient has a chronic Garduno that he uses and apparently gets obstructed very frequently and requires replacement pretty often. We did a bladder scan and then nursing staff removed and replaced the Garduno without difficulty. UA did show signs of infection. He has tolerated Bactrim in the past and previous urine culture was susceptible to Bactrim so he was discharged with Bactrim. After discussion with patient, through shared decision-making, he is comfortable with discharge and following up with urology. The patient was seen and examined unless otherwise specified. Appropriate diagnostic testing was performed and results reviewed with the patient. My independent review and interpretation of data, imaging, labs and diagnostic evaluations are as above and in the ED Course. Previous patient encounter documents & history available on EMR were reviewed  Case discussed with consulting clinician:  none  Shared Decision-Making was performed and disposition discussed with the Patient/Family and questions answered.    Social determinants of health impacting treatment or disposition: Older age     MDM  Number of Diagnoses or Management Options  Problem with Garduno catheter, initial encounter [facility]): established, worsening  Urinary tract infection associated with indwelling urethral catheter, initial encounter [facility]): new, needed workup

## 2023-05-11 NOTE — ED NOTES
Pt presents ambulatory to ED via intake for c/o chronic ortega catheter issues. Pt reports being unable to irrigate catheter today with minimal output. Attempted bladder scan x2 unsuccessful. Dr Shazia Solano at bedside with [address]. With verbal orders, ortega catheter was exchanged. 20f ortega catheter placed and draining yellow urine. Pt tolerated procedure well. Urine specimen collected and sent to lab. Will monitor.      Lisa Berkowitz RN  05/11/23 5902

## 2023-05-11 NOTE — ED NOTES
ED nurse-to-nurse bedside report    Chief Complaint   Patient presents with    Other     Catheter Problem      LOC: alert and orientated to name, place, date  Vital signs   Vitals:    05/11/23 1424   BP: (!) 172/74   Pulse: 80   Resp: 18   Temp: 98.1 °F (36.7 °C)   TempSrc: Oral   SpO2: 97%   Weight: 162 lb (73.5 kg)      Pain:    Pain Interventions: NA  Pain Goal: NA  Oxygen: No    Current needs required Room Air   Telemetry: No  LDAs:    Continuous Infusions:   Mobility: Independent  [place] Fall Risk Score: Fall Risk 5/16/2022 3/4/2021 1/28/2020 6/6/2018 5/11/2017 5/3/2016   2 or more falls in past year? no no no no no no   Fall with injury in past year?  yes no no no no no     Fall Interventions: Side rails, call light within reach  Report given to: Russell Campbell RN  05/11/23 3084

## 2023-05-12 ENCOUNTER — CARE COORDINATION (OUTPATIENT)
Dept: CARE COORDINATION | Age: 88
End: 2023-05-12

## 2023-05-13 LAB
BACTERIA UR CULT: ABNORMAL
ORGANISM: ABNORMAL

## 2023-05-15 NOTE — PROGRESS NOTES
Pharmacy Note  ED Culture Follow-up    Marie Eli is a 80 y.o. male. Allergies: Patient has no known allergies. Current antimicrobials:   Reviewed patient's urine culture - culture positive for Proteus mirabilis. Patient was discharged on Bactrim, and culture is sensitive to prescribed medication. Antibiotic prescribed at discharge is appropriate - no changes made to antibiotic regimen. Please call with any questions.  [address], [facility], PharmD 1:30 PM 5/15/2023

## 2023-05-17 ENCOUNTER — OFFICE VISIT (OUTPATIENT)
Dept: UROLOGY | Age: 88
End: 2023-05-17
Payer: MEDICARE

## 2023-05-17 VITALS — HEIGHT: 72 IN | RESPIRATION RATE: 17 BRPM | WEIGHT: 162 LBS | BODY MASS INDEX: 21.94 KG/M2

## 2023-05-17 DIAGNOSIS — N31.9 NEUROGENIC BLADDER: ICD-10-CM

## 2023-05-17 DIAGNOSIS — N35.916 STRICTURE OF OVERLAPPING SITES OF URETHRA IN MALE, UNSPECIFIED STRICTURE TYPE: ICD-10-CM

## 2023-05-17 DIAGNOSIS — R33.8 ACUTE URINARY RETENTION: ICD-10-CM

## 2023-05-17 DIAGNOSIS — N39.0 RECURRENT UTI: Primary | ICD-10-CM

## 2023-05-17 DIAGNOSIS — Z92.3 HISTORY OF BRACHYTHERAPY: ICD-10-CM

## 2023-05-17 PROCEDURE — 1123F ACP DISCUSS/DSCN MKR DOCD: CPT | Performed by: UROLOGY

## 2023-05-17 PROCEDURE — 1036F TOBACCO NON-USER: CPT | Performed by: UROLOGY

## 2023-05-17 PROCEDURE — G8420 CALC BMI NORM PARAMETERS: HCPCS | Performed by: UROLOGY

## 2023-05-17 PROCEDURE — 99214 OFFICE O/P EST MOD 30 MIN: CPT | Performed by: UROLOGY

## 2023-05-17 PROCEDURE — G8427 DOCREV CUR MEDS BY ELIG CLIN: HCPCS | Performed by: UROLOGY

## 2023-05-17 NOTE — PROGRESS NOTES
Dr. Sandeep Hope MD  [facility])  Urology Clinic      Patient:  Fern Bowens  YOB: 1935  Date: 5/17/2023    HISTORY OF PRESENT ILLNESS:   The patient is a 80 y.o. male who presents today for follow-up for the following problem(s): Urinary retention w ortega. Ortega is getting clogged every 12 days or so. Will schedule for 3x monthly exchanges. He is also flushing his ortega BID. We also increase ortega to 20F. Overall the problem(s) : are worsening. Associated Symptoms: No dysuria, gross hematuria. Pain Severity: 0/10    Summary of old records: Ortega clogged every 10-12 days. Changing ortega 3x monthly. Imaging/Labs reviewed during today's visit:  I have independently reviewed and verified the following films during today's visit. None    Last several PSA's:  Lab Results   Component Value Date    PSA <0.02 10/13/2022    PSA <0.02 05/08/2015       Last total testosterone:  No results found for: TESTOSTERONE    Urinalysis today:  No results found for this visit on 05/17/23.     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

## 2023-05-22 ENCOUNTER — NURSE ONLY (OUTPATIENT)
Dept: UROLOGY | Age: 88
End: 2023-05-22
Payer: MEDICARE

## 2023-05-22 DIAGNOSIS — R33.8 ACUTE URINARY RETENTION: Primary | ICD-10-CM

## 2023-05-22 PROCEDURE — 51702 INSERT TEMP BLADDER CATH: CPT | Performed by: UROLOGY

## 2023-05-22 RX ORDER — MIRTAZAPINE 30 MG/1
30 TABLET, FILM COATED ORAL NIGHTLY
Qty: 30 TABLET | Refills: 5 | Status: SHIPPED | OUTPATIENT
Start: 2023-05-22

## 2023-05-22 NOTE — PROGRESS NOTES
I have personally verified, reviewed, released, signed, authenticated, authorized, confirmed,finalized, and approved the actions of the [facility]. Patient has given me verbal consent to perform catheter change Yes    Does patient have latex allergy? No  Does patient have shellfish or betadine allergy? No      [facility]. 20 Fr Catheter changed without difficulty. Once balloon was deflated, removed catheter without difficulty. Cleansed urethral opening with betadine swab. 20 Fr regular ortega was inserted using sterile water-soluble lubricant without difficulty. Catheter was flushed with 35cc water ensuring return and inflated balloon with 10 ml of water. Ortega Catheter was hooked up to leg bag with straps. Foreskin reduced back down? Yes    Patient instructed on how to drain catheter bags. If patient was given a leg bag, patient was instructed to keep bag above the knee to prevent pulling on catheter. Pt notified if catheter is pulled on may cause pain and bleeding. Patient was also instructed on how to switch from leg bag to overnight bag. Supplies were provided by patient. [facility] sent patient the wrong size catheter, switched out 18fr catheter for a 20fr.

## 2023-05-23 ENCOUNTER — TELEPHONE (OUTPATIENT)
Dept: INTERNAL MEDICINE CLINIC | Age: 88
End: 2023-05-23

## 2023-05-23 DIAGNOSIS — Z85.46 HISTORY OF PROSTATE CANCER: ICD-10-CM

## 2023-05-23 DIAGNOSIS — R19.4 CHANGE IN BOWEL HABITS: Primary | ICD-10-CM

## 2023-05-23 DIAGNOSIS — Z86.010 HISTORY OF COLON POLYPS: ICD-10-CM

## 2023-05-23 NOTE — TELEPHONE ENCOUNTER
Pt called in regarding bowel trouble. He only has this in the am.  He gets up at 7:00 am  By 7:30 or 7:45 am he has what appears to be a normal bowel movement. About 30-45 minutes later he will get the urge to have a BM. The restroom is 25-30 ft away and most of the time he can not make it. It is not loose or watery---just very soft. This happens 4-5 x per week. The rest of the day he is fine. The last 2 days he has not had anything to eat or drink before he had these episodes. He and his wife do not feel they can go anywhere because of this. He is wondering if his lower abdominal hernia has anything to do with this. He can push it in when he is sitting. He is wondering if the hernia could be blocking the emptying of his bowel?

## 2023-05-23 NOTE — TELEPHONE ENCOUNTER
Pt was notified Dr. Zoila Wolf doubts it is related to his hernia and wonders if pt would be willing to have colonoscopy--would be due for one this year. Also would like him to try immodium with first bowel movement to see if notices a difference. Pt in agreement with all of the above  recommendations. He would like Dr. Sri Rae to do his colonoscopy. Order placed and  to arrange.

## 2023-05-25 ENCOUNTER — CARE COORDINATION (OUTPATIENT)
Dept: CARE COORDINATION | Age: 88
End: 2023-05-25

## 2023-05-25 NOTE — CARE COORDINATION
Ambulatory Care Coordination Note  2023    Patient Current Location:  Home: [address] [place] [address]     LISA RUBIN contacted the patient by telephone. Verified name and  with patient as identifiers. Provided introduction to self, and explanation of the LISA RUBIN role. Challenges to be reviewed by the provider   Additional needs identified to be addressed with provider: No  none               Method of communication with provider: none. I spoke with the patient and Maximo Seth for continued Care Coordination follow up and education. Maximo Seth states the patient is doing okay. BM's seem to have improved, but she admits it comes and goes. Patient has not tried  Imodium as PCP recommended. Patient has not heard from Dr. Taniya Spivey office yet re: scheduling for colonoscopy. Changing catheter 3x mth d/t frequent clogging. Encouraged to use PCP office versus ED treatment for sx's of chronic conditions. Patient voiced understanding. I advised patient to contact PCP office if needed. No further needs at this time.        Lab Results       None            Care Coordination Interventions    Referral from Primary Care Provider: No  Suggested Interventions and Community Resources          Goals Addressed    None         Future Appointments   Date Time Provider Silvio Fowler   2023 11:00 AM Irene Renee, [address] [place] [address]   2023  1:00 PM Viet Gonzalez MD [facility] [place]   8/15/2023  2:00 PM [address]

## 2023-06-05 NOTE — TELEPHONE ENCOUNTER
Sheeba Rmairez called requesting a refill on the following medications:  Requested Prescriptions     Pending Prescriptions Disp Refills    tamsulosin (FLOMAX) 0.4 MG capsule [Pharmacy Med Name: TAMSULOSIN HCL 0.4 MG CAPSULE] 60 capsule 1     Sig: take 1 capsule by mouth twice a day     Pharmacy verified:  [email or web address]      Date of last visit: 05/17/2023  Date of next visit (if applicable): 9/21/1074

## 2023-06-07 RX ORDER — TAMSULOSIN HYDROCHLORIDE 0.4 MG/1
CAPSULE ORAL
Qty: 60 CAPSULE | Refills: 1 | OUTPATIENT
Start: 2023-06-07

## 2023-06-20 RX ORDER — TAMSULOSIN HYDROCHLORIDE 0.4 MG/1
CAPSULE ORAL
Qty: 60 CAPSULE | Refills: 1 | OUTPATIENT
Start: 2023-06-20

## 2023-06-22 ENCOUNTER — NURSE ONLY (OUTPATIENT)
Dept: UROLOGY | Age: 88
End: 2023-06-22
Payer: MEDICARE

## 2023-06-22 DIAGNOSIS — R33.8 ACUTE URINARY RETENTION: Primary | ICD-10-CM

## 2023-06-22 PROCEDURE — 51702 INSERT TEMP BLADDER CATH: CPT | Performed by: NURSE PRACTITIONER

## 2023-06-22 NOTE — PROGRESS NOTES
I have personally verified, reviewed, and approved these actions. Pt on tamsulosin despite chronic catheter. Medication can likely be discontinued. Address at upcoming appt with Dr. Maribel Fiore.

## 2023-06-22 NOTE — PROGRESS NOTES
Patient has given me verbal consent to perform catheter change Yes    Does patient have latex allergy? No  Does patient have shellfish or betadine allergy? No      Following Lucia Tam CNP plan of care. 20 Fr Catheter changed without difficulty. Once balloon was deflated, removed catheter without difficulty. Cleansed urethral opening with betadine swab. 20 Fr regular ortega was inserted using sterile water-soluble lubricant without difficulty. Catheter was flushed with 35cc water ensuring return and inflated balloon with 10 ml of water. Ortega Catheter was hooked up to leg bag with straps. Foreskin reduced back down? N/A    Patient instructed on how to drain catheter bags. If patient was given a leg bag, patient was instructed to keep bag above the knee to prevent pulling on catheter. Pt notified if catheter is pulled on may cause pain and bleeding. Patient was also instructed on how to switch from leg bag to overnight bag. Supplies were provided by patient     Patient will discuss Urolift and if he needs to continue Flomax with Dr Isiah Reed at his appointment on 7/5/23. Patient states that he is out of his Flomax and wanted to know if needs to continue he will need some sent to his pharmacy. Per Lucia Tam CNP he can just wait and talk with Dr Isiah Reed at his office visit in July. Patient also notified to call the office if he would start having bladder spasms or any other issues with his catheter. Patient verbalized and understood.

## 2023-06-23 RX ORDER — TAMSULOSIN HYDROCHLORIDE 0.4 MG/1
CAPSULE ORAL
Qty: 30 CAPSULE | Refills: 0 | Status: SHIPPED | OUTPATIENT
Start: 2023-06-23

## 2023-06-29 ENCOUNTER — NURSE ONLY (OUTPATIENT)
Dept: UROLOGY | Age: 88
End: 2023-06-29
Payer: MEDICARE

## 2023-06-29 ENCOUNTER — TELEPHONE (OUTPATIENT)
Dept: UROLOGY | Age: 88
End: 2023-06-29

## 2023-06-29 DIAGNOSIS — R33.9 RETENTION OF URINE: Primary | ICD-10-CM

## 2023-06-29 DIAGNOSIS — T83.511D URINARY TRACT INFECTION ASSOCIATED WITH INDWELLING URETHRAL CATHETER, SUBSEQUENT ENCOUNTER: ICD-10-CM

## 2023-06-29 DIAGNOSIS — N39.0 URINARY TRACT INFECTION ASSOCIATED WITH INDWELLING URETHRAL CATHETER, SUBSEQUENT ENCOUNTER: ICD-10-CM

## 2023-06-29 PROCEDURE — 99999 PR OFFICE/OUTPT VISIT,PROCEDURE ONLY: CPT

## 2023-06-29 PROCEDURE — 51702 INSERT TEMP BLADDER CATH: CPT

## 2023-07-01 LAB
BACTERIA UR CULT: ABNORMAL
ORGANISM: ABNORMAL

## 2023-07-05 ENCOUNTER — OFFICE VISIT (OUTPATIENT)
Dept: UROLOGY | Age: 88
End: 2023-07-05
Payer: MEDICARE

## 2023-07-05 VITALS — BODY MASS INDEX: 21.94 KG/M2 | WEIGHT: 162 LBS | HEIGHT: 72 IN | RESPIRATION RATE: 16 BRPM

## 2023-07-05 DIAGNOSIS — R33.9 RETENTION OF URINE: Primary | ICD-10-CM

## 2023-07-05 DIAGNOSIS — N31.9 NEUROGENIC BLADDER: ICD-10-CM

## 2023-07-05 DIAGNOSIS — Z85.46 HISTORY OF PROSTATE CANCER: ICD-10-CM

## 2023-07-05 PROCEDURE — 1036F TOBACCO NON-USER: CPT | Performed by: UROLOGY

## 2023-07-05 PROCEDURE — 1123F ACP DISCUSS/DSCN MKR DOCD: CPT | Performed by: UROLOGY

## 2023-07-05 PROCEDURE — G8420 CALC BMI NORM PARAMETERS: HCPCS | Performed by: UROLOGY

## 2023-07-05 PROCEDURE — 99214 OFFICE O/P EST MOD 30 MIN: CPT | Performed by: UROLOGY

## 2023-07-05 PROCEDURE — G8427 DOCREV CUR MEDS BY ELIG CLIN: HCPCS | Performed by: UROLOGY

## 2023-07-05 RX ORDER — AMOXICILLIN AND CLAVULANATE POTASSIUM 500; 125 MG/1; MG/1
1 TABLET, FILM COATED ORAL 2 TIMES DAILY
Qty: 10 TABLET | Refills: 0 | Status: SHIPPED | OUTPATIENT
Start: 2023-07-05 | End: 2023-07-10

## 2023-07-05 NOTE — PROGRESS NOTES
Endoscopy    PROSTATE SURGERY      seed implant    TONSILLECTOMY       Family History   Problem Relation Age of Onset    Diabetes Father      Outpatient Medications Marked as Taking for the 23 encounter (Office Visit) with Jhoan Camarillo MD   Medication Sig Dispense Refill    tamsulosin (FLOMAX) 0.4 MG capsule take 1 capsule by mouth twice a day 30 capsule 0    mirtazapine (REMERON) 30 MG tablet take 1 tablet by mouth nightly 30 tablet 5    metoprolol tartrate (LOPRESSOR) 25 MG tablet take 1 tablet by mouth twice a day 60 tablet 5    methenamine (HIPREX) 1 g tablet Take 1 tablet by mouth 2 times daily (with meals) 60 tablet 5    Water For Irrigation, Sterile (STERILE WATER FOR IRRIGATION) Irrigate ortega catheter with  mls of sterile water as needed to maintain patency. 3000 mL 11    lisinopril (PRINIVIL;ZESTRIL) 10 MG tablet TAKE 1 TABLET TWICE DAILY 180 tablet 3    amLODIPine (NORVASC) 10 MG tablet TAKE 1 TABLET EVERY DAY 90 tablet 3    sodium chloride 0.9 % infusion Flush ortega catheter as needed with 60-100ml of saline as needed 3000 mL 12    Elastic Bandages & Supports (B & B HERNIA BELT) MISC Brand per patient preference 1 each 1    Acetaminophen (TYLENOL EXTRA STRENGTH PO) Take by mouth prn      MULTIPLE VITAMIN PO Take by mouth daily         Patient has no known allergies.   Social History     Tobacco Use   Smoking Status Former    Types: Cigarettes    Quit date:     Years since quittin.5    Passive exposure: Never   Smokeless Tobacco Never       Social History     Substance and Sexual Activity   Alcohol Use No       REVIEW OF SYSTEMS:  Constitutional: negative  Eyes: negative  Respiratory: negative  Cardiovascular: negative  Gastrointestinal: negative  Musculoskeletal: negative  Genitourinary: negative except for what is in HPI  Skin: negative   Neurological: negative  Hematological/Lymphatic: negative  Psychological: negative    Physical Exam:      Vitals:    23 1026   Resp: 16

## 2023-07-06 RX ORDER — TAMSULOSIN HYDROCHLORIDE 0.4 MG/1
CAPSULE ORAL
Qty: 30 CAPSULE | Refills: 0 | OUTPATIENT
Start: 2023-07-06

## 2023-07-06 NOTE — TELEPHONE ENCOUNTER
Jason Borrero called requesting a refill on the following medications:  Requested Prescriptions     Pending Prescriptions Disp Refills    tamsulosin (FLOMAX) 0.4 MG capsule [Pharmacy Med Name: TAMSULOSIN HCL 0.4 MG CAPSULE] 30 capsule 0     Sig: take 1 capsule by mouth twice a day     Pharmacy verified:  [email or web address]      Date of last visit: 07/05/2023  Date of next visit (if applicable): 4/64/6410

## 2023-07-10 ENCOUNTER — NURSE ONLY (OUTPATIENT)
Dept: UROLOGY | Age: 88
End: 2023-07-10
Payer: MEDICARE

## 2023-07-10 DIAGNOSIS — R33.9 URINARY RETENTION: Primary | ICD-10-CM

## 2023-07-10 PROCEDURE — 51702 INSERT TEMP BLADDER CATH: CPT

## 2023-07-10 NOTE — PROGRESS NOTES
Patient has given me verbal consent to perform catheter change Yes    Does patient have latex allergy? No  Does patient have shellfish or betadine allergy? No      Following Karyn Beal PA-C plan of care. 20 Fr Catheter changed without difficulty. Once balloon was deflated, removed catheter without difficulty. Cleansed urethral opening with betadine swab. 20 Fr regular ortega was inserted using sterile water-soluble lubricant without difficulty. Catheter was flushed with 35cc water ensuring return and inflated balloon with 10 ml of water. Ortega Catheter was hooked up to leg bag with straps. Foreskin reduced back down? N/A    Patient instructed on how to drain catheter bags. If patient was given a leg bag, patient was instructed to keep bag above the knee to prevent pulling on catheter. Pt notified if catheter is pulled on may cause pain and bleeding. Patient was also instructed on how to switch from leg bag to overnight bag.           Supplies were provided by patient

## 2023-07-10 NOTE — PROGRESS NOTES
I have personally reviewed and verified these actions and am in agreement with plan of care as documented by Cynthia Stout LPN.

## 2023-07-11 ENCOUNTER — OFFICE VISIT (OUTPATIENT)
Dept: INTERNAL MEDICINE CLINIC | Age: 88
End: 2023-07-11
Payer: MEDICARE

## 2023-07-11 VITALS
DIASTOLIC BLOOD PRESSURE: 60 MMHG | SYSTOLIC BLOOD PRESSURE: 140 MMHG | BODY MASS INDEX: 22.78 KG/M2 | HEART RATE: 62 BPM | HEIGHT: 72 IN | WEIGHT: 168.2 LBS

## 2023-07-11 DIAGNOSIS — I10 PRIMARY HYPERTENSION: ICD-10-CM

## 2023-07-11 DIAGNOSIS — Z97.8 FOLEY CATHETER IN PLACE: ICD-10-CM

## 2023-07-11 DIAGNOSIS — R15.2 RECTAL URGENCY: Primary | ICD-10-CM

## 2023-07-11 PROCEDURE — 1036F TOBACCO NON-USER: CPT | Performed by: INTERNAL MEDICINE

## 2023-07-11 PROCEDURE — G8427 DOCREV CUR MEDS BY ELIG CLIN: HCPCS | Performed by: INTERNAL MEDICINE

## 2023-07-11 PROCEDURE — 1123F ACP DISCUSS/DSCN MKR DOCD: CPT | Performed by: INTERNAL MEDICINE

## 2023-07-11 PROCEDURE — 99213 OFFICE O/P EST LOW 20 MIN: CPT | Performed by: INTERNAL MEDICINE

## 2023-07-11 PROCEDURE — G8420 CALC BMI NORM PARAMETERS: HCPCS | Performed by: INTERNAL MEDICINE

## 2023-07-11 RX ORDER — LISINOPRIL 10 MG/1
10 TABLET ORAL 2 TIMES DAILY
Qty: 180 TABLET | Refills: 3 | Status: SHIPPED | OUTPATIENT
Start: 2023-07-11

## 2023-07-11 RX ORDER — AMLODIPINE BESYLATE 10 MG/1
10 TABLET ORAL DAILY
Qty: 90 TABLET | Refills: 3 | Status: SHIPPED | OUTPATIENT
Start: 2023-07-11

## 2023-07-11 RX ORDER — METHENAMINE HIPPURATE 1000 MG/1
1 TABLET ORAL 2 TIMES DAILY WITH MEALS
Qty: 60 TABLET | Refills: 5 | Status: SHIPPED | OUTPATIENT
Start: 2023-07-11

## 2023-07-11 NOTE — PATIENT INSTRUCTIONS
TAKE METAMUCIL DAILY    AVOID CAFFEINE!     GIVE US FEED BACK WHETHER SYMPTOMS IMPROVE    TAKE VITAMIN C 1000 MG THREE TIMES DAILY; GET URINALYSIS ONE WEEK AFTER

## 2023-07-18 ENCOUNTER — NURSE ONLY (OUTPATIENT)
Dept: LAB | Age: 88
End: 2023-07-18

## 2023-07-18 DIAGNOSIS — Z97.8 FOLEY CATHETER IN PLACE: ICD-10-CM

## 2023-07-18 LAB
AMORPH SED URNS QL MICRO: ABNORMAL
BACTERIA: ABNORMAL
BILIRUB UR QL STRIP: NEGATIVE
CASTS #/AREA URNS LPF: ABNORMAL /LPF
CASTS #/AREA URNS LPF: ABNORMAL /LPF
CHARACTER UR: ABNORMAL
CHARCOAL URNS QL MICRO: ABNORMAL
COLOR UR: YELLOW
CRYSTALS URNS QL MICRO: ABNORMAL
EPITHELIAL CELLS, UA: ABNORMAL /HPF
GLUCOSE UR QL STRIP.AUTO: NEGATIVE MG/DL
HGB UR QL STRIP.AUTO: ABNORMAL
KETONES UR QL STRIP.AUTO: NEGATIVE
LEUKOCYTE ESTERASE UR QL STRIP.AUTO: ABNORMAL
NITRITE UR QL STRIP.AUTO: POSITIVE
PH UR STRIP.AUTO: 7.5 [PH] (ref 5–9)
PROT UR STRIP.AUTO-MCNC: 100 MG/DL
RBC #/AREA URNS HPF: ABNORMAL /HPF
RENAL EPI CELLS #/AREA URNS HPF: ABNORMAL /[HPF]
SPECIFIC GRAVITY UA: 1.01 (ref 1–1.03)
UROBILINOGEN, URINE: 0.2 EU/DL (ref 0–1)
WBC #/AREA URNS HPF: > 100 /HPF
YEAST LIKE FUNGI URNS QL MICRO: ABNORMAL

## 2023-07-19 ENCOUNTER — TELEPHONE (OUTPATIENT)
Dept: INTERNAL MEDICINE CLINIC | Age: 88
End: 2023-07-19

## 2023-07-19 DIAGNOSIS — Z97.8 FOLEY CATHETER IN PLACE: Primary | ICD-10-CM

## 2023-07-19 NOTE — TELEPHONE ENCOUNTER
----- Message from Waldo Sykes MD sent at 7/19/2023  9:47 AM EDT -----  Urine still not acid; increase vit c t0 3 gm tid; u/a one week  ----- Message -----  From: Yuniel Reyes Incoming Lab Results From [facility]  Sent: 7/18/2023   3:43 PM EDT  To: Waldo Sykes MD

## 2023-07-19 NOTE — TELEPHONE ENCOUNTER
Pt was notified urine is not acid. He should increase his vitamin C to 3000 mg.tid and repeat urine in a week. Pt verbalizes understanding.

## 2023-07-20 ENCOUNTER — NURSE ONLY (OUTPATIENT)
Dept: UROLOGY | Age: 88
End: 2023-07-20
Payer: MEDICARE

## 2023-07-20 DIAGNOSIS — R33.9 URINARY RETENTION: Primary | ICD-10-CM

## 2023-07-20 PROCEDURE — 51702 INSERT TEMP BLADDER CATH: CPT | Performed by: NURSE PRACTITIONER

## 2023-07-20 NOTE — PROGRESS NOTES
Patient has given me verbal consent to perform catheter change Yes    Does patient have latex allergy? No  Does patient have shellfish or betadine allergy? No      Following MARIKA Amezquita-CNP plan of care. 20 Fr Catheter changed without difficulty. Once balloon was deflated, removed catheter without difficulty. Cleansed urethral opening with betadine swab. 20 Fr regular ortega was inserted using sterile water-soluble lubricant without difficulty. Catheter was flushed with 35cc water ensuring return and inflated balloon with 10 ml of water. Ortega Catheter was hooked up to leg bag with straps. Foreskin reduced back down? Yes    Patient instructed on how to drain catheter bags. If patient was given a leg bag, patient was instructed to keep bag above the knee to prevent pulling on catheter. Pt notified if catheter is pulled on may cause pain and bleeding. Patient was also instructed on how to switch from leg bag to overnight bag.           Supplies were provided by patient

## 2023-07-26 ENCOUNTER — NURSE ONLY (OUTPATIENT)
Dept: LAB | Age: 88
End: 2023-07-26

## 2023-07-26 DIAGNOSIS — Z97.8 FOLEY CATHETER IN PLACE: ICD-10-CM

## 2023-07-26 LAB
BACTERIA: ABNORMAL
BILIRUB UR QL STRIP: NEGATIVE
CASTS #/AREA URNS LPF: ABNORMAL /LPF
CASTS #/AREA URNS LPF: ABNORMAL /LPF
CHARACTER UR: ABNORMAL
CHARCOAL URNS QL MICRO: ABNORMAL
COLOR UR: ABNORMAL
CRYSTALS URNS QL MICRO: ABNORMAL
EPITHELIAL CELLS, UA: ABNORMAL /HPF
GLUCOSE UR QL STRIP.AUTO: NEGATIVE MG/DL
HGB UR QL STRIP.AUTO: ABNORMAL
KETONES UR QL STRIP.AUTO: ABNORMAL
LEUKOCYTE ESTERASE UR QL STRIP.AUTO: ABNORMAL
NITRITE UR QL STRIP.AUTO: POSITIVE
PH UR STRIP.AUTO: 7.5 [PH] (ref 5–9)
PROT UR STRIP.AUTO-MCNC: 300 MG/DL
RBC #/AREA URNS HPF: ABNORMAL /HPF
RENAL EPI CELLS #/AREA URNS HPF: ABNORMAL /[HPF]
SPECIFIC GRAVITY UA: 1.02 (ref 1–1.03)
UROBILINOGEN, URINE: 0.2 EU/DL (ref 0–1)
WBC #/AREA URNS HPF: > 200 /HPF
YEAST LIKE FUNGI URNS QL MICRO: ABNORMAL

## 2023-07-27 ENCOUNTER — TELEPHONE (OUTPATIENT)
Dept: INTERNAL MEDICINE CLINIC | Age: 88
End: 2023-07-27

## 2023-07-27 DIAGNOSIS — Z97.8 FOLEY CATHETER IN PLACE: ICD-10-CM

## 2023-07-27 NOTE — TELEPHONE ENCOUNTER
----- Message from Jose Antonio Ward MD sent at 7/26/2023  4:16 PM EDT -----  Tell him I would stop the methenamime and vit C' probably not being effective.

## 2023-07-27 NOTE — TELEPHONE ENCOUNTER
Pt notified to stop methanemine and vitamin C as probably not being effective. He verbalizes understanding.

## 2023-07-31 ENCOUNTER — NURSE ONLY (OUTPATIENT)
Dept: UROLOGY | Age: 88
End: 2023-07-31
Payer: MEDICARE

## 2023-07-31 DIAGNOSIS — R33.8 ACUTE URINARY RETENTION: Primary | ICD-10-CM

## 2023-07-31 PROCEDURE — NBSRV NON-BILLABLE SERVICE

## 2023-07-31 PROCEDURE — 51702 INSERT TEMP BLADDER CATH: CPT

## 2023-07-31 NOTE — PROGRESS NOTES
Patient has given me verbal consent to perform catheter change Yes    Does patient have latex allergy? No  Does patient have shellfish or betadine allergy? No      Following Chino Steinberg PA-C plan of care. 20 Fr Catheter changed without difficulty. Once balloon was deflated, removed catheter without difficulty. Cleansed urethral opening with betadine swab. 20 Fr regular ortega was inserted using sterile water-soluble lubricant without difficulty. Catheter was flushed with 100cc water ensuring return and inflated balloon with 10 ml of water. Ortega Catheter was hooked up to leg bag with straps. Patient instructed on how to drain catheter bags. If patient was given a leg bag, patient was instructed to keep bag above the knee to prevent pulling on catheter. Pt notified if catheter is pulled on may cause pain and bleeding. Patient was also instructed on how to switch from leg bag to overnight bag.           Supplies were provided by office

## 2023-08-10 ENCOUNTER — NURSE ONLY (OUTPATIENT)
Dept: UROLOGY | Age: 88
End: 2023-08-10
Payer: MEDICARE

## 2023-08-10 DIAGNOSIS — R33.9 URINARY RETENTION: Primary | ICD-10-CM

## 2023-08-10 PROCEDURE — 51702 INSERT TEMP BLADDER CATH: CPT

## 2023-08-10 NOTE — PROGRESS NOTES
Patient has given me verbal consent to perform catheter change Yes    Does patient have latex allergy? No  Does patient have shellfish or betadine allergy? No      Following Ghulam Ybarra PA-C plan of care. 20 Fr Catheter changed without difficulty. Once balloon was deflated, removed catheter without difficulty. Cleansed urethral opening with betadine swab. 20 Fr regular ortega was inserted using sterile water-soluble lubricant without difficulty. Catheter was flushed with 35cc water ensuring return and inflated balloon with 10 ml of water. Ortega Catheter was hooked up to leg bag with straps. Foreskin reduced back down? N/A    Patient instructed on how to drain catheter bags. If patient was given a leg bag, patient was instructed to keep bag above the knee to prevent pulling on catheter. Pt notified if catheter is pulled on may cause pain and bleeding. Patient was also instructed on how to switch from leg bag to overnight bag.           Supplies were provided by office

## 2023-08-21 ENCOUNTER — NURSE ONLY (OUTPATIENT)
Dept: UROLOGY | Age: 88
End: 2023-08-21
Payer: MEDICARE

## 2023-08-21 DIAGNOSIS — R33.9 URINARY RETENTION: Primary | ICD-10-CM

## 2023-08-21 PROCEDURE — 51702 INSERT TEMP BLADDER CATH: CPT

## 2023-08-21 NOTE — PROGRESS NOTES
Patient has given me verbal consent to perform catheter change Yes    Does patient have latex allergy? No  Does patient have shellfish or betadine allergy? No      Following Anitha Beal PA-C plan of care. 20 Fr Catheter changed without difficulty. Once balloon was deflated, removed catheter without difficulty. Cleansed urethral opening with betadine swab. 20 Fr regular ortega was inserted using sterile water-soluble lubricant without difficulty. Catheter was flushed with 35cc water ensuring return and inflated balloon with 10 ml of water. Ortega Catheter was hooked up to leg bag with straps. Foreskin reduced back down? N/A    Patient instructed on how to drain catheter bags. If patient was given a leg bag, patient was instructed to keep bag above the knee to prevent pulling on catheter. Pt notified if catheter is pulled on may cause pain and bleeding. Patient was also instructed on how to switch from leg bag to overnight bag.           Supplies were provided by office

## 2023-08-21 NOTE — PROGRESS NOTES
I have personally reviewed, verified, and am in agreement with these actions as carried out and documented by Meghan Santos CMA.

## 2023-08-31 ENCOUNTER — NURSE ONLY (OUTPATIENT)
Dept: UROLOGY | Age: 88
End: 2023-08-31
Payer: MEDICARE

## 2023-08-31 DIAGNOSIS — R33.9 URINARY RETENTION: Primary | ICD-10-CM

## 2023-08-31 PROCEDURE — 51702 INSERT TEMP BLADDER CATH: CPT | Performed by: NURSE PRACTITIONER

## 2023-08-31 NOTE — PROGRESS NOTES
Patient has given me verbal consent to perform catheter change Yes    Does patient have latex allergy? No  Does patient have shellfish or betadine allergy? No      Following Nadia Lusher, APRN-CNP plan of care. 20Fr Catheter changed without difficulty. Once balloon was deflated, removed catheter without difficulty. Cleansed urethral opening with betadine swab. 20Fr regular ortega was inserted using sterile water-soluble lubricant without difficulty. Catheter was flushed with 35cc water ensuring return and inflated balloon with 10 ml of water. Ortega Catheter was hooked up to leg bag. Foreskin reduced back down? N/A    Patient instructed on how to drain catheter bags. If patient was given a leg bag, patient was instructed to keep bag above the knee to prevent pulling on catheter. Pt notified if catheter is pulled on may cause pain and bleeding. Patient was also instructed on how to switch from leg bag to overnight bag.           Supplies were provided by office

## 2023-09-05 NOTE — TELEPHONE ENCOUNTER
Alvis Favre called requesting a refill on the following medications:  Requested Prescriptions     Pending Prescriptions Disp Refills    sod chloride IRR soln 0.9 % irrigation [Pharmacy Med Name: SODIUM CHLORIDE 0.9% IRRIG.]       Sig: FLUSH ABURTO CATH WITH 50 MLS AS NEEDED     Pharmacy verified:  [email or web address]      Date of last visit: 05/17/2023  Date of next visit (if applicable): 0/43/7727

## 2023-09-06 RX ORDER — MAGNESIUM HYDROXIDE 1200 MG/15ML
LIQUID ORAL
Qty: 3000 ML | Refills: 2 | Status: SHIPPED | OUTPATIENT
Start: 2023-09-06

## 2023-09-06 RX ORDER — MAGNESIUM HYDROXIDE 1200 MG/15ML
LIQUID ORAL
Qty: 3000 ML | Refills: 11 | Status: SHIPPED | OUTPATIENT
Start: 2023-09-06

## 2023-09-06 NOTE — TELEPHONE ENCOUNTER
Corina Birmingham called requesting a refill on the following medications:  Requested Prescriptions     Pending Prescriptions Disp Refills    Water For Irrigation, Sterile (STERILE WATER FOR IRRIGATION) 3000 mL 11     Sig: Irrigate ortega catheter with  mls of sterile water as needed to maintain patency.      Pharmacy verified:  [email or web address]      Date of last visit:   Date of next visit (if applicable): 9/32/5133

## 2023-09-11 ENCOUNTER — NURSE ONLY (OUTPATIENT)
Dept: UROLOGY | Age: 88
End: 2023-09-11
Payer: MEDICARE

## 2023-09-11 DIAGNOSIS — N31.9 NEUROGENIC BLADDER: Primary | ICD-10-CM

## 2023-09-11 PROCEDURE — 51702 INSERT TEMP BLADDER CATH: CPT

## 2023-09-11 NOTE — PROGRESS NOTES
Patient has given me verbal consent to perform catheter change Yes    Does patient have latex allergy? No  Does patient have shellfish or betadine allergy? No      Following Heena Beal PA-C plan of care. 20Fr Catheter changed without difficulty. Once balloon was deflated, removed catheter without difficulty. Cleansed urethral opening with betadine swab. 20Fr regular ortega was inserted using sterile water-soluble lubricant without difficulty. Catheter was flushed with 35cc water ensuring return and inflated balloon with 10 ml of water. Ortega Catheter was hooked up to leg bag. Patient instructed on how to drain catheter bags. If patient was given a leg bag, patient was instructed to keep bag above the knee to prevent pulling on catheter. Pt notified if catheter is pulled on may cause pain and bleeding. Patient was also instructed on how to switch from leg bag to overnight bag.           Supplies were provided by office

## 2023-09-12 NOTE — PROGRESS NOTES
I have personally verified, reviewed, and approved of these actions and the plan of care as documented by KRISTEN Ahmadi.

## 2023-09-21 ENCOUNTER — NURSE ONLY (OUTPATIENT)
Dept: UROLOGY | Age: 88
End: 2023-09-21
Payer: MEDICARE

## 2023-09-21 DIAGNOSIS — N31.9 NEUROGENIC BLADDER: Primary | ICD-10-CM

## 2023-09-21 PROCEDURE — 51702 INSERT TEMP BLADDER CATH: CPT | Performed by: NURSE PRACTITIONER

## 2023-09-21 NOTE — PROGRESS NOTES
Patient has given me verbal consent to perform catheter change Yes    Does patient have latex allergy? No  Does patient have shellfish or betadine allergy? No      Following Luis Angel Guajardo CNP plan of care. 20Fr Catheter changed without difficulty. Once balloon was deflated, removed catheter without difficulty. Cleansed urethral opening with betadine swab. 20Fr regular rotega was inserted using sterile water-soluble lubricant without difficulty. Catheter was flushed with 35cc water ensuring return and inflated balloon with 10 ml of water. Ortega Catheter was hooked up to leg bag. Foreskin reduced back down? N/A    Patient instructed on how to drain catheter bags. If patient was given a leg bag, patient was instructed to keep bag above the knee to prevent pulling on catheter. Pt notified if catheter is pulled on may cause pain and bleeding. Patient was also instructed on how to switch from leg bag to overnight bag.           Supplies were provided by office

## 2023-10-02 ENCOUNTER — NURSE ONLY (OUTPATIENT)
Dept: UROLOGY | Age: 88
End: 2023-10-02
Payer: MEDICARE

## 2023-10-02 DIAGNOSIS — R33.8 ACUTE URINARY RETENTION: Primary | ICD-10-CM

## 2023-10-02 PROCEDURE — 51702 INSERT TEMP BLADDER CATH: CPT

## 2023-10-02 NOTE — PROGRESS NOTES
Patient has given me verbal consent to perform catheter change Yes    Does patient have latex allergy? No  Does patient have shellfish or betadine allergy? No      Following Anitha Beal PA-C plan of care. 20Fr Catheter changed without difficulty. Once balloon was deflated, removed catheter without difficulty. Cleansed urethral opening with betadine swab. 20Fr regular ortega was inserted using sterile water-soluble lubricant without difficulty. Catheter was flushed with 35cc water ensuring return and inflated balloon with 10 ml of water. Ortega Catheter was hooked up to leg bag. Patient instructed on how to drain catheter bags. If patient was given a leg bag, patient was instructed to keep bag above the knee to prevent pulling on catheter. Pt notified if catheter is pulled on may cause pain and bleeding. Patient was also instructed on how to switch from leg bag to overnight bag.           Supplies were provided by office

## 2023-10-02 NOTE — PROGRESS NOTES
I have personally verified, reviewed, and approved of these actions and the plan of care as documented.  By Brock Ulloa MA.

## 2023-10-12 ENCOUNTER — NURSE ONLY (OUTPATIENT)
Dept: UROLOGY | Age: 88
End: 2023-10-12
Payer: MEDICARE

## 2023-10-12 DIAGNOSIS — N31.9 NEUROGENIC BLADDER: Primary | ICD-10-CM

## 2023-10-12 PROCEDURE — 51702 INSERT TEMP BLADDER CATH: CPT | Performed by: NURSE PRACTITIONER

## 2023-10-12 NOTE — PROGRESS NOTES
Patient has given me verbal consent to perform catheter change Yes    Does patient have latex allergy? No  Does patient have shellfish or betadine allergy? No      Following Dr. Giorgio Vieyra, APRN-CNP  plan of care. 20Fr Catheter changed without difficulty. Once balloon was deflated, removed catheter without difficulty. Cleansed urethral opening with betadine swab. 20Fr regular ortega was inserted using sterile water-soluble lubricant without difficulty. Catheter was flushed with 35 cc water ensuring return and inflated balloon with 10 ml of water. Ortega Catheter was hooked up to leg bag. Foreskin reduced back down? N/A    Patient instructed on how to drain catheter bags. If patient was given a leg bag, patient was instructed to keep bag above the knee to prevent pulling on catheter. Pt notified if catheter is pulled on may cause pain and bleeding. Patient was also instructed on how to switch from leg bag to overnight bag.           Supplies were provided by office

## 2023-10-23 ENCOUNTER — NURSE ONLY (OUTPATIENT)
Dept: UROLOGY | Age: 88
End: 2023-10-23
Payer: MEDICARE

## 2023-10-23 DIAGNOSIS — R33.9 URINARY RETENTION: Primary | ICD-10-CM

## 2023-10-23 PROCEDURE — 51702 INSERT TEMP BLADDER CATH: CPT

## 2023-10-23 NOTE — PROGRESS NOTES
I have personally verified, reviewed, and approved of these actions and the plan of care as documented by Kylie Palm LPN.

## 2023-10-23 NOTE — PROGRESS NOTES
Patient has given me verbal consent to perform catheter change Yes and No    Does patient have latex allergy? No  Does patient have shellfish or betadine allergy? No      Following Lior Beal PA-C plan of care. 20Fr Catheter changed without difficulty. Once balloon was deflated, removed catheter without difficulty. Cleansed urethral opening with betadine swab. 20Fr regular ortega was inserted using sterile water-soluble lubricant without difficulty. Catheter was flushed with 35cc water ensuring return and inflated balloon with 10 ml of water. Ortega Catheter was hooked up to leg bag. Foreskin reduced back down? N/a    Patient instructed on how to drain catheter bags. If patient was given a leg bag, patient was instructed to keep bag above the knee to prevent pulling on catheter. Pt notified if catheter is pulled on may cause pain and bleeding. Patient was also instructed on how to switch from leg bag to overnight bag.           Supplies were provided by office

## 2023-11-02 ENCOUNTER — NURSE ONLY (OUTPATIENT)
Dept: UROLOGY | Age: 88
End: 2023-11-02
Payer: MEDICARE

## 2023-11-02 DIAGNOSIS — R33.9 URINARY RETENTION: Primary | ICD-10-CM

## 2023-11-02 PROCEDURE — 51702 INSERT TEMP BLADDER CATH: CPT | Performed by: NURSE PRACTITIONER

## 2023-11-02 NOTE — PROGRESS NOTES
Patient has given me verbal consent to perform catheter change Yes    Does patient have latex allergy? No  Does patient have shellfish or betadine allergy? No      Following JULIANA Lane plan of care. 20Fr Catheter changed without difficulty. Once balloon was deflated, removed catheter without difficulty. Cleansed urethral opening with betadine swab. 20Fr regular ortega was inserted using sterile water-soluble lubricant without difficulty. Catheter was flushed with 35cc water ensuring return and inflated balloon with 10 ml of water. Ortega Catheter was hooked up to leg bag. Foreskin reduced back down? Yes    Patient instructed on how to drain catheter bags. If patient was given a leg bag, patient was instructed to keep bag above the knee to prevent pulling on catheter. Pt notified if catheter is pulled on may cause pain and bleeding. Patient was also instructed on how to switch from leg bag to overnight bag.           Supplies were provided by office

## 2023-11-13 ENCOUNTER — NURSE ONLY (OUTPATIENT)
Dept: UROLOGY | Age: 88
End: 2023-11-13

## 2023-11-13 DIAGNOSIS — R33.9 URINARY RETENTION: Primary | ICD-10-CM

## 2023-11-13 NOTE — PROGRESS NOTES
Patient has given me verbal consent to perform catheter change Yes    Does patient have latex allergy? No  Does patient have shellfish or betadine allergy? No      Following Dr. Dori Larson PA-C plan of care. 20Fr Catheter changed without difficulty. Once balloon was deflated, removed catheter without difficulty. Cleansed urethral opening with betadine swab. 20Fr regular ortega was inserted using sterile water-soluble lubricant without difficulty. Catheter was flushed with 35 cc water ensuring return and inflated balloon with 10 ml of water. Ortega Catheter was hooked up to leg bag. Foreskin reduced back down? Yes    Patient instructed on how to drain catheter bags. If patient was given a leg bag, patient was instructed to keep bag above the knee to prevent pulling on catheter. Pt notified if catheter is pulled on may cause pain and bleeding. Patient was also instructed on how to switch from leg bag to overnight bag.           Supplies were provided by office

## 2023-11-13 NOTE — PROGRESS NOTES
I have personally verified, reviewed, and approved of these actions and the plan of care as documented by Marcy Baker LPN.

## 2023-11-22 ENCOUNTER — NURSE ONLY (OUTPATIENT)
Dept: UROLOGY | Age: 88
End: 2023-11-22
Payer: MEDICARE

## 2023-11-22 DIAGNOSIS — R33.9 URINARY RETENTION: Primary | ICD-10-CM

## 2023-11-22 PROCEDURE — 51702 INSERT TEMP BLADDER CATH: CPT | Performed by: NURSE PRACTITIONER

## 2023-11-22 PROCEDURE — NBSRV NON-BILLABLE SERVICE: Performed by: NURSE PRACTITIONER

## 2023-11-22 NOTE — PROGRESS NOTES
I have personally verified, reviewed, released, signed, authenticated, authorized, confirmed,finalized, and approved the actions of the staff. Continue with monthly exchanges.     Keep office follow up scheduled 1/10/24

## 2023-11-22 NOTE — PROGRESS NOTES
Patient has given me verbal consent to perform catheter change Yes    Does patient have latex allergy? No  Does patient have shellfish or betadine allergy? No      Following Layne Beal PA-C plan of care. 20Fr Catheter changed without difficulty. Once balloon was deflated, removed catheter without difficulty. Cleansed urethral opening with betadine swab. 20Fr regular ortega was inserted using sterile water-soluble lubricant without difficulty. Catheter was flushed with 35cc water ensuring return and inflated balloon with 10 ml of water. Ortega Catheter was hooked up to leg bag. Patient instructed on how to drain catheter bags. If patient was given a leg bag, patient was instructed to keep bag above the knee to prevent pulling on catheter. Pt notified if catheter is pulled on may cause pain and bleeding. Patient was also instructed on how to switch from leg bag to overnight bag.           Supplies were provided by office

## 2023-11-29 RX ORDER — MIRTAZAPINE 30 MG/1
30 TABLET, FILM COATED ORAL NIGHTLY
Qty: 30 TABLET | Refills: 5 | Status: SHIPPED | OUTPATIENT
Start: 2023-11-29

## 2023-11-30 ENCOUNTER — TELEPHONE (OUTPATIENT)
Dept: INTERNAL MEDICINE CLINIC | Age: 88
End: 2023-11-30

## 2023-11-30 NOTE — TELEPHONE ENCOUNTER
Notified pt Dr. Junior Dhaliwal does not think his symptoms are related to long covid. He states his wife has not had covid.

## 2023-11-30 NOTE — TELEPHONE ENCOUNTER
Pt called in saying he and his wife both have had some peculiar physical symptoms. He has had hip and knee pain off and on. Sometimes will last 3-4 days and then go away. He has taken extra strength Tylenol which helps. His wife has had a respiratory illness  x 1 month and has went to urgent care a couple of times. Pt is wondering if Dr. Sandor Dallas has heard of spiked protein which from his research is related to covid 19 and is a longterm illness. He is wondering if they may have this? In regard to his hip and knee pain he says he will make an appt if flares up pretty bad.

## 2023-12-04 ENCOUNTER — NURSE ONLY (OUTPATIENT)
Dept: UROLOGY | Age: 88
End: 2023-12-04
Payer: MEDICARE

## 2023-12-04 DIAGNOSIS — R33.8 ACUTE URINARY RETENTION: Primary | ICD-10-CM

## 2023-12-04 PROCEDURE — 51702 INSERT TEMP BLADDER CATH: CPT

## 2023-12-04 NOTE — PROGRESS NOTES
Patient has given me verbal consent to perform catheter change Yes    Does patient have latex allergy? No  Does patient have shellfish or betadine allergy? No      Following Radha Beal PA-C plan of care. 20Fr Catheter changed without difficulty. Once balloon was deflated, removed catheter without difficulty. Cleansed urethral opening with betadine swab. 20Fr regular ortega was inserted using sterile water-soluble lubricant without difficulty. Catheter was flushed with 35cc water ensuring return and inflated balloon with 10 ml of water. Ortega Catheter was hooked up to leg bag. Patient instructed on how to drain catheter bags. If patient was given a leg bag, patient was instructed to keep bag above the knee to prevent pulling on catheter. Pt notified if catheter is pulled on may cause pain and bleeding. Patient was also instructed on how to switch from leg bag to overnight bag.           Supplies were provided by office

## 2023-12-04 NOTE — PROGRESS NOTES
I have personally verified, reviewed, and approved of these actions as documented by KRISTEN Ahmadi.

## 2023-12-14 ENCOUNTER — NURSE ONLY (OUTPATIENT)
Dept: UROLOGY | Age: 88
End: 2023-12-14
Payer: MEDICARE

## 2023-12-14 DIAGNOSIS — R33.9 URINARY RETENTION: Primary | ICD-10-CM

## 2023-12-14 PROCEDURE — 51702 INSERT TEMP BLADDER CATH: CPT | Performed by: NURSE PRACTITIONER

## 2023-12-14 NOTE — PROGRESS NOTES
Patient has given me verbal consent to perform catheter change Yes    Does patient have latex allergy? No  Does patient have shellfish or betadine allergy? No      Following Phillip Levine CNP plan of care. 20Fr Catheter changed without difficulty. Once balloon was deflated, removed catheter without difficulty. Cleansed urethral opening with betadine swab. 20Fr regular ortega was inserted using sterile water-soluble lubricant without difficulty. Catheter was flushed with 35cc water ensuring return and inflated balloon with 10 ml of water. Ortega Catheter was hooked up to leg bag. Foreskin reduced back down? N/A    Patient instructed on how to drain catheter bags. If patient was given a leg bag, patient was instructed to keep bag above the knee to prevent pulling on catheter. Pt notified if catheter is pulled on may cause pain and bleeding. Patient was also instructed on how to switch from leg bag to overnight bag.           Supplies were provided by office

## 2024-01-02 ENCOUNTER — OFFICE VISIT (OUTPATIENT)
Dept: UROLOGY | Age: 89
End: 2024-01-02
Payer: MEDICARE

## 2024-01-02 VITALS — HEIGHT: 72 IN | RESPIRATION RATE: 16 BRPM | WEIGHT: 156 LBS | BODY MASS INDEX: 21.13 KG/M2

## 2024-01-02 DIAGNOSIS — N31.9 NEUROGENIC BLADDER: Primary | ICD-10-CM

## 2024-01-02 PROCEDURE — 99213 OFFICE O/P EST LOW 20 MIN: CPT

## 2024-01-02 PROCEDURE — 1123F ACP DISCUSS/DSCN MKR DOCD: CPT

## 2024-01-02 PROCEDURE — G8420 CALC BMI NORM PARAMETERS: HCPCS

## 2024-01-02 PROCEDURE — 51702 INSERT TEMP BLADDER CATH: CPT

## 2024-01-02 PROCEDURE — G8484 FLU IMMUNIZE NO ADMIN: HCPCS

## 2024-01-02 PROCEDURE — 1036F TOBACCO NON-USER: CPT

## 2024-01-02 PROCEDURE — G8427 DOCREV CUR MEDS BY ELIG CLIN: HCPCS

## 2024-01-02 NOTE — PROGRESS NOTES
[facility]  [facility] UROLOGY  [address]  [address]  [facility] [address]  Dept: [phone]  Loc: [phone]  Visit Date: 2024      HPI  Phillip Bates is a 88 y.o. male that presents to the urology clinic for neurogenic bladder follow-up.    Presenting for routine follow-up. Patient with chronic indwelling ortega catheter due to his neurogenic bladder. Ortega is exchanged every 10 days or so to avoid becoming obstructed with sediment. Also flushing BID with 60 oz of sterile water. Doing very well with his current routine. Ortega catheter is patent and draining straw-colored urine today.       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, unspecified stricture type      Past Surgical History:   Procedure Laterality Date    COLONOSCOPY      CYSTOSCOPY Left 10/14/2021    CYSTOSCOPY EVACUATION OF CLOTS, EVACUATION OF BLADDER STONE, CHANNEL TURP  performed by Walter Rubio MD at [facility] OR    HEMORRHOID SURGERY      HERNIA REPAIR      AR COLONOSCOPY FLX DX W/COLLJ SPEC WHEN PFRMD Left 2018    COLONOSCOPY performed by Mike Ford MD at [facility] Endoscopy    PROSTATE SURGERY      seed implant    TONSILLECTOMY       Family History   Problem Relation Age of Onset    Diabetes Father      No outpatient medications have been marked as taking for the 24 encounter (Appointment) with Adolfo Beal PA-C.       Patient has no known allergies.  Social History     Tobacco Use   Smoking Status Former    Current packs/day: 0.00    Types: Cigarettes    Quit date:     Years since quittin.0    Passive exposure: Never   Smokeless

## 2024-01-02 NOTE — PROGRESS NOTES
Patient has given me verbal consent to perform catheter change Yes    Does patient have latex allergy?No  Does patient have shellfish or betadine allergy? No      Following Adolfo Beal PA-C plan of care. 20Fr Catheter changed without difficulty.    Once balloon was deflated, removed catheter without difficulty. Cleansed urethral opening with betadine swab. 20Fr regular ortega was inserted using sterile water-soluble lubricant without difficulty. Catheter was flushed with 35 cc water ensuring return and inflated balloon with 10 ml of water. Ortega Catheter was hooked up to leg bag.    Foreskin reduced back down? N/A    Patient instructed on how to drain catheter bags.    If patient was given a leg bag, patient was instructed to keep bag above the knee to prevent pulling on catheter. Pt notified if catheter is pulled on may cause pain and bleeding.     Patient was also instructed on how to switch from leg bag to overnight bag.          Supplies were provided by office

## 2024-01-05 ENCOUNTER — ANESTHESIA EVENT (OUTPATIENT)
Dept: OPERATING ROOM | Age: 89
End: 2024-01-05
Payer: MEDICARE

## 2024-01-05 ENCOUNTER — APPOINTMENT (OUTPATIENT)
Dept: GENERAL RADIOLOGY | Age: 89
DRG: 522 | End: 2024-01-05
Payer: MEDICARE

## 2024-01-05 ENCOUNTER — HOSPITAL ENCOUNTER (INPATIENT)
Age: 89
LOS: 3 days | Discharge: HOME HEALTH | DRG: 522 | End: 2024-01-08
Attending: STUDENT IN AN ORGANIZED HEALTH CARE EDUCATION/TRAINING PROGRAM | Admitting: STUDENT IN AN ORGANIZED HEALTH CARE EDUCATION/TRAINING PROGRAM
Payer: MEDICARE

## 2024-01-05 DIAGNOSIS — G89.18 POST-OP PAIN: ICD-10-CM

## 2024-01-05 DIAGNOSIS — S72.002A CLOSED FRACTURE OF LEFT HIP, INITIAL ENCOUNTER (HCC): Primary | ICD-10-CM

## 2024-01-05 LAB
ALBUMIN SERPL BCG-MCNC: 4.1 G/DL (ref 3.5–5.1)
ALP SERPL-CCNC: 125 U/L (ref 38–126)
ALT SERPL W/O P-5'-P-CCNC: 18 U/L (ref 11–66)
ANION GAP SERPL CALC-SCNC: 16 MEQ/L (ref 8–16)
AST SERPL-CCNC: 23 U/L (ref 5–40)
BASOPHILS ABSOLUTE: 0 THOU/MM3 (ref 0–0.1)
BASOPHILS NFR BLD AUTO: 0.2 %
BILIRUB SERPL-MCNC: 0.5 MG/DL (ref 0.3–1.2)
BUN SERPL-MCNC: 14 MG/DL (ref 7–22)
CALCIUM SERPL-MCNC: 9.9 MG/DL (ref 8.5–10.5)
CHLORIDE SERPL-SCNC: 100 MEQ/L (ref 98–111)
CO2 SERPL-SCNC: 26 MEQ/L (ref 23–33)
CREAT SERPL-MCNC: 0.9 MG/DL (ref 0.4–1.2)
DEPRECATED RDW RBC AUTO: 48.6 FL (ref 35–45)
EOSINOPHIL NFR BLD AUTO: 0.1 %
EOSINOPHILS ABSOLUTE: 0 THOU/MM3 (ref 0–0.4)
ERYTHROCYTE [DISTWIDTH] IN BLOOD BY AUTOMATED COUNT: 13.8 % (ref 11.5–14.5)
GFR SERPL CREATININE-BSD FRML MDRD: > 60 ML/MIN/1.73M2
GLUCOSE SERPL-MCNC: 134 MG/DL (ref 70–108)
HCT VFR BLD AUTO: 45.2 % (ref 42–52)
HGB BLD-MCNC: 15 GM/DL (ref 14–18)
IMM GRANULOCYTES # BLD AUTO: 0.09 THOU/MM3 (ref 0–0.07)
IMM GRANULOCYTES NFR BLD AUTO: 0.7 %
INR PPP: 1.02 (ref 0.85–1.13)
LYMPHOCYTES ABSOLUTE: 0.8 THOU/MM3 (ref 1–4.8)
LYMPHOCYTES NFR BLD AUTO: 5.9 %
MAGNESIUM SERPL-MCNC: 2.2 MG/DL (ref 1.6–2.4)
MCH RBC QN AUTO: 31.6 PG (ref 26–33)
MCHC RBC AUTO-ENTMCNC: 33.2 GM/DL (ref 32.2–35.5)
MCV RBC AUTO: 95.2 FL (ref 80–94)
MONOCYTES ABSOLUTE: 0.9 THOU/MM3 (ref 0.4–1.3)
MONOCYTES NFR BLD AUTO: 6.8 %
NEUTROPHILS NFR BLD AUTO: 86.3 %
NRBC BLD AUTO-RTO: 0 /100 WBC
OSMOLALITY SERPL CALC.SUM OF ELEC: 285.6 MOSMOL/KG (ref 275–300)
PLATELET # BLD AUTO: 387 THOU/MM3 (ref 130–400)
PMV BLD AUTO: 8.8 FL (ref 9.4–12.4)
POTASSIUM SERPL-SCNC: 3.3 MEQ/L (ref 3.5–5.2)
PROT SERPL-MCNC: 7.8 G/DL (ref 6.1–8)
RBC # BLD AUTO: 4.75 MILL/MM3 (ref 4.7–6.1)
SEGMENTED NEUTROPHILS ABSOLUTE COUNT: 11.9 THOU/MM3 (ref 1.8–7.7)
SODIUM SERPL-SCNC: 142 MEQ/L (ref 135–145)
WBC # BLD AUTO: 13.8 THOU/MM3 (ref 4.8–10.8)

## 2024-01-05 PROCEDURE — 99285 EMERGENCY DEPT VISIT HI MDM: CPT

## 2024-01-05 PROCEDURE — 86923 COMPATIBILITY TEST ELECTRIC: CPT

## 2024-01-05 PROCEDURE — 36415 COLL VENOUS BLD VENIPUNCTURE: CPT

## 2024-01-05 PROCEDURE — 6370000000 HC RX 637 (ALT 250 FOR IP)

## 2024-01-05 PROCEDURE — 80053 COMPREHEN METABOLIC PANEL: CPT

## 2024-01-05 PROCEDURE — 73552 X-RAY EXAM OF FEMUR 2/>: CPT

## 2024-01-05 PROCEDURE — 85610 PROTHROMBIN TIME: CPT

## 2024-01-05 PROCEDURE — 93005 ELECTROCARDIOGRAM TRACING: CPT | Performed by: PHYSICIAN ASSISTANT

## 2024-01-05 PROCEDURE — 96372 THER/PROPH/DIAG INJ SC/IM: CPT

## 2024-01-05 PROCEDURE — 6370000000 HC RX 637 (ALT 250 FOR IP): Performed by: PHYSICIAN ASSISTANT

## 2024-01-05 PROCEDURE — 86901 BLOOD TYPING SEROLOGIC RH(D): CPT

## 2024-01-05 PROCEDURE — 83735 ASSAY OF MAGNESIUM: CPT

## 2024-01-05 PROCEDURE — 1200000000 HC SEMI PRIVATE

## 2024-01-05 PROCEDURE — 99221 1ST HOSP IP/OBS SF/LOW 40: CPT | Performed by: INTERNAL MEDICINE

## 2024-01-05 PROCEDURE — 6370000000 HC RX 637 (ALT 250 FOR IP): Performed by: INTERNAL MEDICINE

## 2024-01-05 PROCEDURE — 86900 BLOOD TYPING SEROLOGIC ABO: CPT

## 2024-01-05 PROCEDURE — 2580000003 HC RX 258: Performed by: PHYSICIAN ASSISTANT

## 2024-01-05 PROCEDURE — 71045 X-RAY EXAM CHEST 1 VIEW: CPT

## 2024-01-05 PROCEDURE — 73502 X-RAY EXAM HIP UNI 2-3 VIEWS: CPT

## 2024-01-05 PROCEDURE — 86850 RBC ANTIBODY SCREEN: CPT

## 2024-01-05 PROCEDURE — 6360000002 HC RX W HCPCS: Performed by: NURSE PRACTITIONER

## 2024-01-05 PROCEDURE — 85025 COMPLETE CBC W/AUTO DIFF WBC: CPT

## 2024-01-05 PROCEDURE — 6820000001 HC L2 TRAUMA SURGERY EVALUATION

## 2024-01-05 RX ORDER — SODIUM CHLORIDE 9 MG/ML
INJECTION, SOLUTION INTRAVENOUS PRN
Status: DISCONTINUED | OUTPATIENT
Start: 2024-01-05 | End: 2024-01-08 | Stop reason: HOSPADM

## 2024-01-05 RX ORDER — MORPHINE SULFATE 4 MG/ML
4 INJECTION, SOLUTION INTRAMUSCULAR; INTRAVENOUS
Status: CANCELLED | OUTPATIENT
Start: 2024-01-05

## 2024-01-05 RX ORDER — OXYCODONE HYDROCHLORIDE 5 MG/1
10 TABLET ORAL EVERY 4 HOURS PRN
Status: CANCELLED | OUTPATIENT
Start: 2024-01-05

## 2024-01-05 RX ORDER — OXYCODONE HYDROCHLORIDE 5 MG/1
5 TABLET ORAL EVERY 4 HOURS PRN
Status: DISCONTINUED | OUTPATIENT
Start: 2024-01-05 | End: 2024-01-08 | Stop reason: HOSPADM

## 2024-01-05 RX ORDER — POTASSIUM CHLORIDE 7.45 MG/ML
10 INJECTION INTRAVENOUS PRN
Status: CANCELLED | OUTPATIENT
Start: 2024-01-05

## 2024-01-05 RX ORDER — POTASSIUM CHLORIDE 20 MEQ/1
40 TABLET, EXTENDED RELEASE ORAL PRN
Status: DISCONTINUED | OUTPATIENT
Start: 2024-01-05 | End: 2024-01-08 | Stop reason: HOSPADM

## 2024-01-05 RX ORDER — SODIUM CHLORIDE 0.9 % (FLUSH) 0.9 %
5-40 SYRINGE (ML) INJECTION PRN
Status: DISCONTINUED | OUTPATIENT
Start: 2024-01-05 | End: 2024-01-08 | Stop reason: HOSPADM

## 2024-01-05 RX ORDER — OXYCODONE HYDROCHLORIDE 5 MG/1
5 TABLET ORAL EVERY 4 HOURS PRN
Status: CANCELLED | OUTPATIENT
Start: 2024-01-05

## 2024-01-05 RX ORDER — MORPHINE SULFATE 2 MG/ML
2 INJECTION, SOLUTION INTRAMUSCULAR; INTRAVENOUS
Status: CANCELLED | OUTPATIENT
Start: 2024-01-05

## 2024-01-05 RX ORDER — SODIUM CHLORIDE 0.9 % (FLUSH) 0.9 %
5-40 SYRINGE (ML) INJECTION PRN
Status: CANCELLED | OUTPATIENT
Start: 2024-01-05

## 2024-01-05 RX ORDER — SODIUM CHLORIDE 9 MG/ML
INJECTION, SOLUTION INTRAVENOUS CONTINUOUS
Status: CANCELLED | OUTPATIENT
Start: 2024-01-05

## 2024-01-05 RX ORDER — FAMOTIDINE 20 MG/1
20 TABLET, FILM COATED ORAL DAILY
Status: DISCONTINUED | OUTPATIENT
Start: 2024-01-05 | End: 2024-01-08 | Stop reason: HOSPADM

## 2024-01-05 RX ORDER — POTASSIUM CHLORIDE 7.45 MG/ML
10 INJECTION INTRAVENOUS
Status: ACTIVE | OUTPATIENT
Start: 2024-01-05 | End: 2024-01-05

## 2024-01-05 RX ORDER — ONDANSETRON 4 MG/1
4 TABLET, ORALLY DISINTEGRATING ORAL EVERY 8 HOURS PRN
Status: CANCELLED | OUTPATIENT
Start: 2024-01-05

## 2024-01-05 RX ORDER — ONDANSETRON 2 MG/ML
4 INJECTION INTRAMUSCULAR; INTRAVENOUS EVERY 6 HOURS PRN
Status: CANCELLED | OUTPATIENT
Start: 2024-01-05

## 2024-01-05 RX ORDER — SODIUM CHLORIDE 9 MG/ML
INJECTION, SOLUTION INTRAVENOUS CONTINUOUS
Status: DISCONTINUED | OUTPATIENT
Start: 2024-01-05 | End: 2024-01-08 | Stop reason: HOSPADM

## 2024-01-05 RX ORDER — POTASSIUM CHLORIDE 20 MEQ/1
40 TABLET, EXTENDED RELEASE ORAL PRN
Status: CANCELLED | OUTPATIENT
Start: 2024-01-05

## 2024-01-05 RX ORDER — SODIUM CHLORIDE 0.9 % (FLUSH) 0.9 %
5-40 SYRINGE (ML) INJECTION EVERY 12 HOURS SCHEDULED
Status: DISCONTINUED | OUTPATIENT
Start: 2024-01-05 | End: 2024-01-08 | Stop reason: HOSPADM

## 2024-01-05 RX ORDER — AMLODIPINE BESYLATE 10 MG/1
10 TABLET ORAL DAILY
Status: DISCONTINUED | OUTPATIENT
Start: 2024-01-06 | End: 2024-01-08 | Stop reason: HOSPADM

## 2024-01-05 RX ORDER — BISACODYL 5 MG/1
5 TABLET, DELAYED RELEASE ORAL DAILY PRN
Status: DISCONTINUED | OUTPATIENT
Start: 2024-01-05 | End: 2024-01-08 | Stop reason: HOSPADM

## 2024-01-05 RX ORDER — MAGNESIUM HYDROXIDE 1200 MG/15ML
50 LIQUID ORAL PRN
Status: DISCONTINUED | OUTPATIENT
Start: 2024-01-05 | End: 2024-01-08 | Stop reason: HOSPADM

## 2024-01-05 RX ORDER — SODIUM CHLORIDE 0.9 % (FLUSH) 0.9 %
5-40 SYRINGE (ML) INJECTION EVERY 12 HOURS SCHEDULED
Status: CANCELLED | OUTPATIENT
Start: 2024-01-05

## 2024-01-05 RX ORDER — MORPHINE SULFATE 4 MG/ML
4 INJECTION, SOLUTION INTRAMUSCULAR; INTRAVENOUS ONCE
Status: COMPLETED | OUTPATIENT
Start: 2024-01-05 | End: 2024-01-05

## 2024-01-05 RX ORDER — LISINOPRIL 10 MG/1
10 TABLET ORAL 2 TIMES DAILY
Status: DISCONTINUED | OUTPATIENT
Start: 2024-01-07 | End: 2024-01-08 | Stop reason: HOSPADM

## 2024-01-05 RX ORDER — MAGNESIUM SULFATE IN WATER 40 MG/ML
2000 INJECTION, SOLUTION INTRAVENOUS PRN
Status: CANCELLED | OUTPATIENT
Start: 2024-01-05

## 2024-01-05 RX ORDER — ACETAMINOPHEN 325 MG/1
650 TABLET ORAL EVERY 6 HOURS
Status: DISCONTINUED | OUTPATIENT
Start: 2024-01-05 | End: 2024-01-08 | Stop reason: HOSPADM

## 2024-01-05 RX ORDER — MORPHINE SULFATE 2 MG/ML
2 INJECTION, SOLUTION INTRAMUSCULAR; INTRAVENOUS
Status: DISCONTINUED | OUTPATIENT
Start: 2024-01-05 | End: 2024-01-08 | Stop reason: HOSPADM

## 2024-01-05 RX ORDER — MIRTAZAPINE 30 MG/1
30 TABLET, FILM COATED ORAL NIGHTLY
Status: DISCONTINUED | OUTPATIENT
Start: 2024-01-05 | End: 2024-01-08 | Stop reason: HOSPADM

## 2024-01-05 RX ORDER — PANTOPRAZOLE SODIUM 40 MG/1
40 TABLET, DELAYED RELEASE ORAL DAILY
Status: CANCELLED | OUTPATIENT
Start: 2024-01-05

## 2024-01-05 RX ORDER — ONDANSETRON 2 MG/ML
4 INJECTION INTRAMUSCULAR; INTRAVENOUS EVERY 6 HOURS PRN
Status: DISCONTINUED | OUTPATIENT
Start: 2024-01-05 | End: 2024-01-08 | Stop reason: HOSPADM

## 2024-01-05 RX ORDER — ACETAMINOPHEN 325 MG/1
650 TABLET ORAL EVERY 6 HOURS
Status: CANCELLED | OUTPATIENT
Start: 2024-01-05

## 2024-01-05 RX ORDER — ONDANSETRON 4 MG/1
4 TABLET, ORALLY DISINTEGRATING ORAL EVERY 8 HOURS PRN
Status: DISCONTINUED | OUTPATIENT
Start: 2024-01-05 | End: 2024-01-08 | Stop reason: HOSPADM

## 2024-01-05 RX ORDER — OXYCODONE HYDROCHLORIDE 5 MG/1
10 TABLET ORAL EVERY 4 HOURS PRN
Status: DISCONTINUED | OUTPATIENT
Start: 2024-01-05 | End: 2024-01-08 | Stop reason: HOSPADM

## 2024-01-05 RX ORDER — SODIUM CHLORIDE 9 MG/ML
INJECTION, SOLUTION INTRAVENOUS PRN
Status: CANCELLED | OUTPATIENT
Start: 2024-01-05

## 2024-01-05 RX ORDER — POLYETHYLENE GLYCOL 3350 17 G/17G
17 POWDER, FOR SOLUTION ORAL DAILY
Status: DISCONTINUED | OUTPATIENT
Start: 2024-01-05 | End: 2024-01-08 | Stop reason: HOSPADM

## 2024-01-05 RX ORDER — POTASSIUM CHLORIDE 7.45 MG/ML
10 INJECTION INTRAVENOUS PRN
Status: DISCONTINUED | OUTPATIENT
Start: 2024-01-05 | End: 2024-01-08 | Stop reason: HOSPADM

## 2024-01-05 RX ORDER — MORPHINE SULFATE 4 MG/ML
4 INJECTION, SOLUTION INTRAMUSCULAR; INTRAVENOUS
Status: DISCONTINUED | OUTPATIENT
Start: 2024-01-05 | End: 2024-01-08 | Stop reason: HOSPADM

## 2024-01-05 RX ADMIN — FAMOTIDINE 20 MG: 20 TABLET, FILM COATED ORAL at 16:55

## 2024-01-05 RX ADMIN — ACETAMINOPHEN 650 MG: 325 TABLET ORAL at 21:12

## 2024-01-05 RX ADMIN — POTASSIUM CHLORIDE 40 MEQ: 1500 TABLET, EXTENDED RELEASE ORAL at 16:55

## 2024-01-05 RX ADMIN — POLYETHYLENE GLYCOL 3350 17 G: 17 POWDER, FOR SOLUTION ORAL at 16:55

## 2024-01-05 RX ADMIN — MORPHINE SULFATE 4 MG: 4 INJECTION, SOLUTION INTRAMUSCULAR; INTRAVENOUS at 11:46

## 2024-01-05 RX ADMIN — SODIUM CHLORIDE: 9 INJECTION, SOLUTION INTRAVENOUS at 15:54

## 2024-01-05 RX ADMIN — SODIUM CHLORIDE: 9 INJECTION, SOLUTION INTRAVENOUS at 21:59

## 2024-01-05 RX ADMIN — MIRTAZAPINE 30 MG: 30 TABLET, FILM COATED ORAL at 21:12

## 2024-01-05 RX ADMIN — METOPROLOL TARTRATE 25 MG: 25 TABLET, FILM COATED ORAL at 21:12

## 2024-01-05 RX ADMIN — MAGNESIUM HYDROXIDE 50 ML: 1200 LIQUID ORAL at 21:19

## 2024-01-05 ASSESSMENT — PAIN - FUNCTIONAL ASSESSMENT
PAIN_FUNCTIONAL_ASSESSMENT: ACTIVITIES ARE NOT PREVENTED
PAIN_FUNCTIONAL_ASSESSMENT: ACTIVITIES ARE NOT PREVENTED
PAIN_FUNCTIONAL_ASSESSMENT: 0-10
PAIN_FUNCTIONAL_ASSESSMENT: 0-10

## 2024-01-05 ASSESSMENT — PAIN DESCRIPTION - DESCRIPTORS
DESCRIPTORS: ACHING
DESCRIPTORS: ACHING

## 2024-01-05 ASSESSMENT — PAIN DESCRIPTION - ORIENTATION
ORIENTATION: LEFT

## 2024-01-05 ASSESSMENT — PAIN SCALES - GENERAL
PAINLEVEL_OUTOF10: 4
PAINLEVEL_OUTOF10: 4
PAINLEVEL_OUTOF10: 2
PAINLEVEL_OUTOF10: 3
PAINLEVEL_OUTOF10: 3
PAINLEVEL_OUTOF10: 7

## 2024-01-05 ASSESSMENT — PAIN DESCRIPTION - LOCATION
LOCATION: HIP

## 2024-01-05 ASSESSMENT — PAIN DESCRIPTION - PAIN TYPE
TYPE: ACUTE PAIN

## 2024-01-05 NOTE — CONSULTS
18   BILITOT 0.5   ALKPHOS 125     Recent Labs     01/05/24  1250   INR 1.02     No results for input(s): \"CKTOTAL\", \"TROPONINI\" in the last 72 hours.    Urinalysis:    Lab Results   Component Value Date/Time    NITRU POSITIVE 07/26/2023 01:09 PM    WBCUA > 200 07/26/2023 01:09 PM    BACTERIA FEW 07/26/2023 01:09 PM    RBCUA 3-5 07/26/2023 01:09 PM    BLOODU SMALL 07/26/2023 01:09 PM    SPECGRAV 1.018 07/26/2023 01:09 PM    GLUCOSEU NEGATIVE 05/11/2023 02:45 PM       Radiology:   XR CHEST PORTABLE   Final Result      No acute intrathoracic process.               **This report has been created using voice recognition software. It may contain minor errors which are inherent in voice recognition technology.**      Final report electronically signed by Dr. Bari Shin on 1/5/2024 1:20 PM      XR FEMUR LEFT (MIN 2 VIEWS)   Final Result   1. No acute bony abnormality            **This report has been created using voice recognition software.  It may contain minor errors which are inherent in voice recognition technology.**      Final report electronically signed by Dr Denise Matthews on 1/5/2024 12:19 PM      XR HIP 2-3 VW W PELVIS LEFT   Final Result   1. Acute angulated and displaced fracture of the left femoral neck.            **This report has been created using voice recognition software.  It may contain minor errors which are inherent in voice recognition technology.**      Final report electronically signed by Dr Denise Matthews on 1/5/2024 11:56 AM        XR CHEST PORTABLE    Result Date: 1/5/2024  PROCEDURE: XR CHEST PORTABLE CLINICAL INFORMATION: Indication provided by the ordering physician's assistant is \"preop\". TECHNIQUE: Mobile AP chest radiograph. COMPARISON: PA and lateral chest radiographs 1/26/2023 FINDINGS: Cardiomediastinal silhouette is within normal limits. There are no lung consolidations. Degenerative changes in the thoracic spine are poorly visualized. Soft tissues are unremarkable.     No

## 2024-01-05 NOTE — ED NOTES
In for hourly rounding. Pt resting on cot in position of comfort. Pt remains A&Ox4, resps easy and unlabored. Pain has increased after XR. Medicated pt per MAR. Pt assisted to comfortable position at this time. Updated pt on POC. Will monitor.

## 2024-01-05 NOTE — ED NOTES
[facility]. Floor contacted before transport,spoke to Caleb.

## 2024-01-05 NOTE — ED PROVIDER NOTES
normal.   Pulmonary:      Effort: Pulmonary effort is normal. No respiratory distress.      Breath sounds: Normal breath sounds.   Chest:      Chest wall: No tenderness.   Abdominal:      General: Bowel sounds are normal. There is no distension.      Palpations: Abdomen is soft.      Tenderness: There is no abdominal tenderness.   Musculoskeletal:      Cervical back: Normal range of motion and neck supple.      Lumbar back: No swelling, tenderness or bony tenderness. Normal range of motion. Negative right straight leg raise test and negative left straight leg raise test.      Left hip: No tenderness, bony tenderness or crepitus. Decreased range of motion. Decreased strength.      Left upper leg: No swelling, edema, deformity, tenderness or bony tenderness.   Skin:     General: Skin is warm and dry.      Coloration: Skin is not pale.      Findings: No erythema or rash.   Neurological:      Mental Status: He is alert and oriented to person, place, and time.   Psychiatric:         Behavior: Behavior normal.         Thought Content: Thought content normal.         Judgment: Judgment normal.         DIFFERENTIAL DIAGNOSIS:   Fracture strain sprain contusion    DIAGNOSTIC RESULTS       RADIOLOGY: non-plainfilm images(s) such as CT, Ultrasound and MRI are read by the radiologist.  Plain radiographic images are visualized and preliminarily interpreted by the emergency physician unless otherwise stated below.  XR CHEST PORTABLE   Final Result      No acute intrathoracic process.               **This report has been created using voice recognition software. It may contain minor errors which are inherent in voice recognition technology.**      Final report electronically signed by Dr. Bari Shin on 1/5/2024 1:20 PM      XR FEMUR LEFT (MIN 2 VIEWS)   Final Result   1. No acute bony abnormality            **This report has been created using voice recognition software.  It may contain minor errors which are inherent in voice

## 2024-01-05 NOTE — ED NOTES
ED to inpatient nurses report      Chief Complaint:  Chief Complaint   Patient presents with    Hip Injury     L hip    Fall     Present to ED from: Home    MOA:     LOC: alert and orientated to name, place, date  Mobility: Requires assistance * 2  Oxygen Baseline: Room Air    Current needs required: Room Air     Code Status:   Full Code    What abnormal results were found and what did you give/do to treat them?   Any procedures or intervention occur?     Mental Status:  Level of Consciousness: Alert (0)    Psych Assessment:        Vitals:  Patient Vitals for the past 24 hrs:   BP Temp Temp src Pulse Resp SpO2 Height Weight   01/05/24 1216 (!) 173/92 -- -- 78 18 95 % -- --   01/05/24 1145 (!) 180/88 -- -- 77 18 97 % -- --   01/05/24 1050 (!) 179/92 97.9 °F (36.6 °C) Oral 80 18 97 % 1.829 m (6') 68 kg (150 lb)        LDAs:   Peripheral IV 01/05/24 Right Antecubital (Active)   Site Assessment Clean, dry & intact 01/05/24 1301   Line Status Blood return noted;Flushed;Normal saline locked;Specimen collected 01/05/24 1301   Phlebitis Assessment No symptoms 01/05/24 1301   Infiltration Assessment 0 01/05/24 1301   Dressing Status Clean, dry & intact 01/05/24 1301   Dressing Type Transparent 01/05/24 1301   Dressing Intervention New 01/05/24 1301       Ambulatory Status:  No data recorded    Diagnosis:  DISPOSITION Admitted 01/05/2024 12:25:39 PM   Final diagnoses:   Closed fracture of left hip, initial encounter ([facility])        Consults:  IP CONSULT TO HOSPITALIST  IP CONSULT TO CASE MANAGEMENT  IP CONSULT TO SPIRITUAL SERVICES  IP CONSULT TO SOCIAL WORK  IP CONSULT TO CASE MANAGEMENT  IP CONSULT TO HOSPITALIST     Pain Score:  Pain Assessment  Pain Assessment: 0-10  Pain Level: 7  Pain Location: Hip  Pain Orientation: Left  Pain Type: Acute pain    C-SSRS:   Risk of Suicide: No Risk    Sepsis Screening:  Sepsis Risk Score: 1.33    [place] Fall Risk:       Swallow Screening        Preferred Language:   English      ALLERGIES

## 2024-01-05 NOTE — H&P
Orthopaedic H&P  Patient:  Phillip Bates  YOB: 1935  MRN: [id number]     Acct: [id number]    PCP: Med Gay MD  Date of Admission: 1/5/2024  Date of Service: Pt seen/examined on 1/5/2024     Chief Complaint: left hip pain  History Of Present Illness: 88 y.o. male who presents with left hip pain after a mechanical fall down the steps this morning. Did not hit head, no loc, immediate L hip pain and inability to weight bear. Imaging in ED revealed a left femoral neck fracture for which orthopaedics was consulted. Pain is to the left groin, worsened with palpation ROM weight bearing, relieved by immobilization, no paresthesias or weakness, non radiating, no other msk complaints.     Lives independently, community ambulator .  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, unspecified stricture type        Past Surgical History:        Procedure Laterality Date    COLONOSCOPY      CYSTOSCOPY Left 10/14/2021    CYSTOSCOPY EVACUATION OF CLOTS, EVACUATION OF BLADDER STONE, CHANNEL TURP  performed by Walter Rubio MD at [facility] OR    HEMORRHOID SURGERY  1970    HERNIA REPAIR      [facility] COLONOSCOPY FLX DX W/COLLJ SPEC WHEN PFRMD Left 6/11/2018    COLONOSCOPY performed by Mike Ford MD at [facility] Endoscopy    PROSTATE SURGERY  2005    seed implant    TONSILLECTOMY         Home Medications:   Prior to Admission medications    Medication Sig Start Date End Date Taking? Authorizing Provider   mirtazapine (REMERON) 30 MG tablet Take 1 tablet by mouth nightly 11/29/23   Med Gay MD   sod chloride IRR soln 0.9 % irrigation FLUSH ABURTO CATH

## 2024-01-05 NOTE — ED NOTES
Pt presents to ED via [facility] for c/o fall with hip injury. Pt was at [employer] and missed a step going into the basement, falling onto L side. EMS reports pt was able to stand/pivot to EMS cot with assistance. Upon initial assessment, pt is A&Ox4, resps easy and unlabored. Slight shortening noted to the L leg. Pt reports pain 2-3/10 at rest, increased pain when trying to stand or walk on L leg. No other concerns noted at this time. Awaiting provider assessment and orders. Will monitor.

## 2024-01-05 NOTE — ED NOTES
In for hourly rounding. Pt resting on cot in position of comfort. Pt remains A&Ox4, resps easy and unlabored. IV established with blood drawn, flushed and shows no s/s of infection or infiltration. Pt pain remains unchanged at this time. Folded blankets placed under pt's R knee for support, per request. Made pt as comfortable as possible. Monitor remains in place. Updated pt on POC. Will monitor.

## 2024-01-06 ENCOUNTER — ANESTHESIA (OUTPATIENT)
Dept: OPERATING ROOM | Age: 89
End: 2024-01-06
Payer: MEDICARE

## 2024-01-06 ENCOUNTER — APPOINTMENT (OUTPATIENT)
Dept: GENERAL RADIOLOGY | Age: 89
DRG: 522 | End: 2024-01-06
Payer: MEDICARE

## 2024-01-06 LAB
ABO: NORMAL
ANTIBODY SCREEN: NORMAL
DEPRECATED MEAN GLUCOSE BLD GHB EST-ACNC: 105 MG/DL (ref 70–126)
EKG ATRIAL RATE: 91 BPM
EKG P AXIS: 44 DEGREES
EKG P-R INTERVAL: 184 MS
EKG Q-T INTERVAL: 384 MS
EKG QRS DURATION: 92 MS
EKG QTC CALCULATION (BAZETT): 472 MS
EKG R AXIS: -33 DEGREES
EKG T AXIS: 40 DEGREES
EKG VENTRICULAR RATE: 91 BPM
HBA1C MFR BLD HPLC: 5.5 % (ref 4.4–6.4)
MAGNESIUM SERPL-MCNC: 2 MG/DL (ref 1.6–2.4)
POTASSIUM SERPL-SCNC: 4 MEQ/L (ref 3.5–5.2)
RH FACTOR: NORMAL

## 2024-01-06 PROCEDURE — 83036 HEMOGLOBIN GLYCOSYLATED A1C: CPT

## 2024-01-06 PROCEDURE — 1200000000 HC SEMI PRIVATE

## 2024-01-06 PROCEDURE — 36415 COLL VENOUS BLD VENIPUNCTURE: CPT

## 2024-01-06 PROCEDURE — 3700000000 HC ANESTHESIA ATTENDED CARE: Performed by: STUDENT IN AN ORGANIZED HEALTH CARE EDUCATION/TRAINING PROGRAM

## 2024-01-06 PROCEDURE — 2580000003 HC RX 258: Performed by: STUDENT IN AN ORGANIZED HEALTH CARE EDUCATION/TRAINING PROGRAM

## 2024-01-06 PROCEDURE — 6360000002 HC RX W HCPCS

## 2024-01-06 PROCEDURE — 2720000010 HC SURG SUPPLY STERILE: Performed by: STUDENT IN AN ORGANIZED HEALTH CARE EDUCATION/TRAINING PROGRAM

## 2024-01-06 PROCEDURE — 73502 X-RAY EXAM HIP UNI 2-3 VIEWS: CPT

## 2024-01-06 PROCEDURE — 3600000004 HC SURGERY LEVEL 4 BASE: Performed by: STUDENT IN AN ORGANIZED HEALTH CARE EDUCATION/TRAINING PROGRAM

## 2024-01-06 PROCEDURE — 6360000002 HC RX W HCPCS: Performed by: STUDENT IN AN ORGANIZED HEALTH CARE EDUCATION/TRAINING PROGRAM

## 2024-01-06 PROCEDURE — 6370000000 HC RX 637 (ALT 250 FOR IP): Performed by: PHYSICIAN ASSISTANT

## 2024-01-06 PROCEDURE — 2500000003 HC RX 250 WO HCPCS: Performed by: STUDENT IN AN ORGANIZED HEALTH CARE EDUCATION/TRAINING PROGRAM

## 2024-01-06 PROCEDURE — 73501 X-RAY EXAM HIP UNI 1 VIEW: CPT

## 2024-01-06 PROCEDURE — 7100000000 HC PACU RECOVERY - FIRST 15 MIN: Performed by: STUDENT IN AN ORGANIZED HEALTH CARE EDUCATION/TRAINING PROGRAM

## 2024-01-06 PROCEDURE — 3600000014 HC SURGERY LEVEL 4 ADDTL 15MIN: Performed by: STUDENT IN AN ORGANIZED HEALTH CARE EDUCATION/TRAINING PROGRAM

## 2024-01-06 PROCEDURE — C1776 JOINT DEVICE (IMPLANTABLE): HCPCS | Performed by: STUDENT IN AN ORGANIZED HEALTH CARE EDUCATION/TRAINING PROGRAM

## 2024-01-06 PROCEDURE — 3700000001 HC ADD 15 MINUTES (ANESTHESIA): Performed by: STUDENT IN AN ORGANIZED HEALTH CARE EDUCATION/TRAINING PROGRAM

## 2024-01-06 PROCEDURE — 2700000000 HC OXYGEN THERAPY PER DAY

## 2024-01-06 PROCEDURE — 94761 N-INVAS EAR/PLS OXIMETRY MLT: CPT

## 2024-01-06 PROCEDURE — 6370000000 HC RX 637 (ALT 250 FOR IP): Performed by: STUDENT IN AN ORGANIZED HEALTH CARE EDUCATION/TRAINING PROGRAM

## 2024-01-06 PROCEDURE — 2500000003 HC RX 250 WO HCPCS

## 2024-01-06 PROCEDURE — 83735 ASSAY OF MAGNESIUM: CPT

## 2024-01-06 PROCEDURE — 2580000003 HC RX 258: Performed by: PHYSICIAN ASSISTANT

## 2024-01-06 PROCEDURE — 93010 ELECTROCARDIOGRAM REPORT: CPT | Performed by: INTERNAL MEDICINE

## 2024-01-06 PROCEDURE — 0SRS0J9 REPLACEMENT OF LEFT HIP JOINT, FEMORAL SURFACE WITH SYNTHETIC SUBSTITUTE, CEMENTED, OPEN APPROACH: ICD-10-PCS | Performed by: STUDENT IN AN ORGANIZED HEALTH CARE EDUCATION/TRAINING PROGRAM

## 2024-01-06 PROCEDURE — 2709999900 HC NON-CHARGEABLE SUPPLY: Performed by: STUDENT IN AN ORGANIZED HEALTH CARE EDUCATION/TRAINING PROGRAM

## 2024-01-06 PROCEDURE — 7100000001 HC PACU RECOVERY - ADDTL 15 MIN: Performed by: STUDENT IN AN ORGANIZED HEALTH CARE EDUCATION/TRAINING PROGRAM

## 2024-01-06 PROCEDURE — 84132 ASSAY OF SERUM POTASSIUM: CPT

## 2024-01-06 PROCEDURE — C1713 ANCHOR/SCREW BN/BN,TIS/BN: HCPCS | Performed by: STUDENT IN AN ORGANIZED HEALTH CARE EDUCATION/TRAINING PROGRAM

## 2024-01-06 DEVICE — COBALT CHROME 12/14 TAPER FEMORAL                                    HEAD 28MM + 0: Type: IMPLANTABLE DEVICE | Site: HIP | Status: FUNCTIONAL

## 2024-01-06 DEVICE — TANDEM BIPOLAR COBALT CHROME 52MM                                    OUTER DIAMETER 28MM INNER DIAMETER
Type: IMPLANTABLE DEVICE | Site: HIP | Status: FUNCTIONAL
Brand: TANDEM

## 2024-01-06 DEVICE — RALLY MV AB BONE CEMENT 40 GRAMS
Type: IMPLANTABLE DEVICE | Site: HIP | Status: FUNCTIONAL
Brand: RALLY

## 2024-01-06 DEVICE — HIP H4 TOT HEMI UNIV BIPLR IMPL CAPPED H4 SN: Type: IMPLANTABLE DEVICE | Site: HIP | Status: FUNCTIONAL

## 2024-01-06 DEVICE — INVIS DISTAL CENTRALIZERS SIZE 15MM
Type: IMPLANTABLE DEVICE | Site: HIP | Status: FUNCTIONAL
Brand: INVIS

## 2024-01-06 DEVICE — SYNERGY CEMENTED FEMORAL COMPONENT                                    SZ 16
Type: IMPLANTABLE DEVICE | Site: HIP | Status: FUNCTIONAL
Brand: SYNERGY

## 2024-01-06 RX ORDER — SODIUM CHLORIDE 0.9 % (FLUSH) 0.9 %
5-40 SYRINGE (ML) INJECTION PRN
Status: DISCONTINUED | OUTPATIENT
Start: 2024-01-06 | End: 2024-01-06 | Stop reason: HOSPADM

## 2024-01-06 RX ORDER — ONDANSETRON 2 MG/ML
4 INJECTION INTRAMUSCULAR; INTRAVENOUS
Status: DISCONTINUED | OUTPATIENT
Start: 2024-01-06 | End: 2024-01-06 | Stop reason: HOSPADM

## 2024-01-06 RX ORDER — FENTANYL CITRATE 50 UG/ML
50 INJECTION, SOLUTION INTRAMUSCULAR; INTRAVENOUS EVERY 5 MIN PRN
Status: DISCONTINUED | OUTPATIENT
Start: 2024-01-06 | End: 2024-01-06 | Stop reason: HOSPADM

## 2024-01-06 RX ORDER — CEFAZOLIN SODIUM 1 G/3ML
INJECTION, POWDER, FOR SOLUTION INTRAMUSCULAR; INTRAVENOUS PRN
Status: DISCONTINUED | OUTPATIENT
Start: 2024-01-06 | End: 2024-01-06 | Stop reason: SDUPTHER

## 2024-01-06 RX ORDER — LIDOCAINE HCL/PF 100 MG/5ML
SYRINGE (ML) INJECTION PRN
Status: DISCONTINUED | OUTPATIENT
Start: 2024-01-06 | End: 2024-01-06 | Stop reason: SDUPTHER

## 2024-01-06 RX ORDER — ASPIRIN 81 MG/1
81 TABLET ORAL 2 TIMES DAILY
Status: DISCONTINUED | OUTPATIENT
Start: 2024-01-07 | End: 2024-01-08 | Stop reason: HOSPADM

## 2024-01-06 RX ORDER — PROPOFOL 10 MG/ML
INJECTION, EMULSION INTRAVENOUS PRN
Status: DISCONTINUED | OUTPATIENT
Start: 2024-01-06 | End: 2024-01-06 | Stop reason: SDUPTHER

## 2024-01-06 RX ORDER — SODIUM CHLORIDE 0.9 % (FLUSH) 0.9 %
5-40 SYRINGE (ML) INJECTION EVERY 12 HOURS SCHEDULED
Status: DISCONTINUED | OUTPATIENT
Start: 2024-01-06 | End: 2024-01-06 | Stop reason: HOSPADM

## 2024-01-06 RX ORDER — HYDROMORPHONE HYDROCHLORIDE 2 MG/ML
INJECTION, SOLUTION INTRAMUSCULAR; INTRAVENOUS; SUBCUTANEOUS PRN
Status: DISCONTINUED | OUTPATIENT
Start: 2024-01-06 | End: 2024-01-06 | Stop reason: SDUPTHER

## 2024-01-06 RX ORDER — PHENYLEPHRINE HCL IN 0.9% NACL 1 MG/10 ML
SYRINGE (ML) INTRAVENOUS PRN
Status: DISCONTINUED | OUTPATIENT
Start: 2024-01-06 | End: 2024-01-06 | Stop reason: SDUPTHER

## 2024-01-06 RX ORDER — ROCURONIUM BROMIDE 10 MG/ML
INJECTION, SOLUTION INTRAVENOUS PRN
Status: DISCONTINUED | OUTPATIENT
Start: 2024-01-06 | End: 2024-01-06 | Stop reason: SDUPTHER

## 2024-01-06 RX ORDER — DEXAMETHASONE SODIUM PHOSPHATE 10 MG/ML
INJECTION, EMULSION INTRAMUSCULAR; INTRAVENOUS PRN
Status: DISCONTINUED | OUTPATIENT
Start: 2024-01-06 | End: 2024-01-06 | Stop reason: SDUPTHER

## 2024-01-06 RX ORDER — VANCOMYCIN HYDROCHLORIDE 1 G/20ML
INJECTION, POWDER, LYOPHILIZED, FOR SOLUTION INTRAVENOUS PRN
Status: DISCONTINUED | OUTPATIENT
Start: 2024-01-06 | End: 2024-01-06 | Stop reason: ALTCHOICE

## 2024-01-06 RX ORDER — TRANEXAMIC ACID 100 MG/ML
INJECTION, SOLUTION INTRAVENOUS PRN
Status: DISCONTINUED | OUTPATIENT
Start: 2024-01-06 | End: 2024-01-06 | Stop reason: ALTCHOICE

## 2024-01-06 RX ORDER — SODIUM CHLORIDE 9 MG/ML
INJECTION, SOLUTION INTRAVENOUS PRN
Status: DISCONTINUED | OUTPATIENT
Start: 2024-01-06 | End: 2024-01-06 | Stop reason: HOSPADM

## 2024-01-06 RX ORDER — ONDANSETRON 2 MG/ML
INJECTION INTRAMUSCULAR; INTRAVENOUS PRN
Status: DISCONTINUED | OUTPATIENT
Start: 2024-01-06 | End: 2024-01-06 | Stop reason: SDUPTHER

## 2024-01-06 RX ORDER — FENTANYL CITRATE 50 UG/ML
INJECTION, SOLUTION INTRAMUSCULAR; INTRAVENOUS PRN
Status: DISCONTINUED | OUTPATIENT
Start: 2024-01-06 | End: 2024-01-06 | Stop reason: SDUPTHER

## 2024-01-06 RX ORDER — FENTANYL CITRATE 50 UG/ML
25 INJECTION, SOLUTION INTRAMUSCULAR; INTRAVENOUS EVERY 5 MIN PRN
Status: DISCONTINUED | OUTPATIENT
Start: 2024-01-06 | End: 2024-01-06 | Stop reason: HOSPADM

## 2024-01-06 RX ADMIN — ACETAMINOPHEN 650 MG: 325 TABLET ORAL at 03:50

## 2024-01-06 RX ADMIN — HYDROMORPHONE HYDROCHLORIDE 1 MG: 2 INJECTION INTRAMUSCULAR; INTRAVENOUS; SUBCUTANEOUS at 08:27

## 2024-01-06 RX ADMIN — Medication 200 MCG: at 09:11

## 2024-01-06 RX ADMIN — SODIUM CHLORIDE: 9 INJECTION, SOLUTION INTRAVENOUS at 06:02

## 2024-01-06 RX ADMIN — SUGAMMADEX 200 MG: 100 INJECTION, SOLUTION INTRAVENOUS at 09:30

## 2024-01-06 RX ADMIN — MAGNESIUM HYDROXIDE 50 ML: 1200 LIQUID ORAL at 20:49

## 2024-01-06 RX ADMIN — PROPOFOL 50 MG: 10 INJECTION, EMULSION INTRAVENOUS at 08:15

## 2024-01-06 RX ADMIN — DEXAMETHASONE SODIUM PHOSPHATE 10 MG: 10 INJECTION, EMULSION INTRAMUSCULAR; INTRAVENOUS at 08:03

## 2024-01-06 RX ADMIN — PROPOFOL 50 MG: 10 INJECTION, EMULSION INTRAVENOUS at 08:27

## 2024-01-06 RX ADMIN — CEFAZOLIN 2 G: 1 INJECTION, POWDER, FOR SOLUTION INTRAMUSCULAR; INTRAVENOUS at 08:16

## 2024-01-06 RX ADMIN — ROCURONIUM BROMIDE 50 MG: 10 INJECTION INTRAVENOUS at 08:03

## 2024-01-06 RX ADMIN — ROCURONIUM BROMIDE 20 MG: 10 INJECTION INTRAVENOUS at 08:34

## 2024-01-06 RX ADMIN — CEFAZOLIN 2000 MG: 10 INJECTION, POWDER, FOR SOLUTION INTRAVENOUS at 13:14

## 2024-01-06 RX ADMIN — METOPROLOL TARTRATE 25 MG: 25 TABLET, FILM COATED ORAL at 20:42

## 2024-01-06 RX ADMIN — ACETAMINOPHEN 650 MG: 325 TABLET ORAL at 20:44

## 2024-01-06 RX ADMIN — FAMOTIDINE 20 MG: 20 TABLET, FILM COATED ORAL at 11:12

## 2024-01-06 RX ADMIN — Medication 100 MCG: at 09:23

## 2024-01-06 RX ADMIN — PROPOFOL 100 MG: 10 INJECTION, EMULSION INTRAVENOUS at 08:02

## 2024-01-06 RX ADMIN — METOPROLOL TARTRATE 25 MG: 25 TABLET, FILM COATED ORAL at 11:07

## 2024-01-06 RX ADMIN — MAGNESIUM HYDROXIDE 50 ML: 1200 LIQUID ORAL at 06:06

## 2024-01-06 RX ADMIN — FENTANYL CITRATE 100 MCG: 50 INJECTION, SOLUTION INTRAMUSCULAR; INTRAVENOUS at 08:02

## 2024-01-06 RX ADMIN — POLYETHYLENE GLYCOL 3350 17 G: 17 POWDER, FOR SOLUTION ORAL at 11:12

## 2024-01-06 RX ADMIN — ONDANSETRON 4 MG: 2 INJECTION INTRAMUSCULAR; INTRAVENOUS at 09:23

## 2024-01-06 RX ADMIN — AMLODIPINE BESYLATE 10 MG: 10 TABLET ORAL at 11:07

## 2024-01-06 RX ADMIN — CEFAZOLIN 2000 MG: 10 INJECTION, POWDER, FOR SOLUTION INTRAVENOUS at 20:47

## 2024-01-06 RX ADMIN — MIRTAZAPINE 30 MG: 30 TABLET, FILM COATED ORAL at 20:42

## 2024-01-06 RX ADMIN — Medication 80 MG: at 08:02

## 2024-01-06 ASSESSMENT — PAIN - FUNCTIONAL ASSESSMENT: PAIN_FUNCTIONAL_ASSESSMENT: NONE - DENIES PAIN

## 2024-01-06 ASSESSMENT — PAIN SCALES - GENERAL
PAINLEVEL_OUTOF10: 2
PAINLEVEL_OUTOF10: 0

## 2024-01-06 NOTE — H&P
History and Physical Update    Pt Name: Phillip Bates  MRN: [id number]  YOB: 1935  Date of evaluation: 1/6/2024    [x] I have examined the patient and reviewed the H&P/Consult and there are no changes to the patient or plans.    [] I have examined the patient and reviewed the H&P/Consult and have noted the following changes:        Victor Hugo Art PA-C   Electronically signed 1/6/2024 at 6:58 AM

## 2024-01-06 NOTE — ANESTHESIA POSTPROCEDURE EVALUATION
Department of Anesthesiology  Postprocedure Note    Patient: Phillip Bates  MRN: [id number]  YOB: 1935  Date of evaluation: 1/6/2024    Procedure Summary       Date: 01/06/24 Room / Location: [facility] OR 03 / [facility] OR    Anesthesia Start: 0759 Anesthesia Stop: 0939    Procedure: Left Hip Hemiarthroplasty (Left) Diagnosis:       Hip injury, unspecified laterality, initial encounter      (Hip injury, unspecified laterality, initial encounter [S79.919A])    Surgeons: Terry Armendariz DO Responsible Provider: Yeison Alvarez DO    Anesthesia Type: General ASA Status: 3            Anesthesia Type: General    Jan Phase I: Jan Score: 9    Jan Phase II:      Anesthesia Post Evaluation    Patient location during evaluation: PACU  Patient participation: complete - patient participated  Level of consciousness: awake and confused (Pt is awake and alert, but does have history of dementia.  Pt not answering any questions, but does turn and look at you when you talk to him.  Most likely confused secondary to the preop dementia and exacerbated by the GETA.  Will continue to monitor.)  Pain score: 0  Airway patency: patent  Nausea & Vomiting: no nausea and no vomiting  Cardiovascular status: hemodynamically stable and blood pressure returned to baseline  Respiratory status: spontaneous ventilation, room air and acceptable  Hydration status: stable  Pain management: adequate and satisfactory to patient    No notable events documented.

## 2024-01-06 NOTE — OP NOTE
osteotome along the cranial aspect of the neck followed by the canal finder on hand.  We then sequentially broached to a size 16 stem which had adequate press-fit.  We then trialed a standard offset neck and 52 mm head.  The hip was reduced and returned to its anatomic position.  Fluoroscopic imaging was obtained which indicated appropriate restoration of leg length and lateral offset as well as desired stem positioning.  No sign of iatrogenic fracture was noted. Clinically there was minimal lateral or distal shuck and the soft tissue tension appeared appropriate. The hip was then dislocated.  The trials were removed.  The wound was copiously irrigated with both normal saline solution and Irrisept.  The final components were implanted with cement and held in place until the cement polymerized.  Once it polymerized we retrialed and selected a standard offset 52 mm head.  The hip was reduced and stable to range of motion.  The short external rotators were repaired through drill tunnels in the greater trochanter.  The wound was copiously irrigated with normal saline and Irrisept solution vancomycin and TXA was placed in the wound.  The incision was closed in a layered fashion.  The field was cleaned and dried, staples and sterile dressing was applied.  The patient was successfully extubated by the anesthesia providers with no known complications and transferred to the recovery room.    Postoperative plan:  We will obtain digital x-rays of the operative hip in the recovery room.  The patient will be placed on 23 hours of prophylactic antibiotics and will start aspirin 81 mg twice daily for DVT prophylaxis.. He will be permitted to begin weightbearing as tolerated immediately and will be evaluated by Physical and Occupational Therapy.  Posterior hip precautions with physical therapy.  Hip abductor pillow while patient in bed.  Following discharge from the hospital the patient will be evaluated in the orthopedic trauma

## 2024-01-06 NOTE — ANESTHESIA PRE PROCEDURE
PRN Lilliam Rowe PA-C        0.9 % sodium chloride infusion   IntraVENous PRN Lilliam Rowe PA-C        morphine (PF) injection 2 mg  2 mg IntraVENous Q2H PRN Lilliam Rowe PA-C        Or    morphine (PF) injection 4 mg  4 mg IntraVENous Q2H PRN Lilliam Rowe PA-C        ondansetron (ZOFRAN-ODT) disintegrating tablet 4 mg  4 mg Oral Q8H PRN Lilliam Rowe PA-C        Or    ondansetron (ZOFRAN) injection 4 mg  4 mg IntraVENous Q6H PRN Lilliam Rowe PA-C        acetaminophen (TYLENOL) tablet 650 mg  650 mg Oral Q6H Lilliam Rowe PA-C   650 mg at 01/06/24 0350    oxyCODONE (ROXICODONE) immediate release tablet 5 mg  5 mg Oral Q4H PRN Lilliam Rowe PA-C        Or    oxyCODONE (ROXICODONE) immediate release tablet 10 mg  10 mg Oral Q4H PRN Lilliam Rowe PA-C        polyethylene glycol (GLYCOLAX) packet 17 g  17 g Oral Daily Lilliam Rowe PA-C   17 g at 01/05/24 1655    bisacodyl (DULCOLAX) EC tablet 5 mg  5 mg Oral Daily PRN Lilliam Rowe PA-C        famotidine (PEPCID) tablet 20 mg  20 mg Oral Daily Lilliam Rowe PA-C   20 mg at 01/05/24 1655    0.9 % sodium chloride infusion   IntraVENous PRN Lilliam Rowe PA-C        potassium chloride (KLOR-CON M) extended release tablet 40 mEq  40 mEq Oral PRN Avani Gregory MD   40 mEq at 01/05/24 1655    Or    potassium bicarb-citric acid (EFFER-K) effervescent tablet 40 mEq  40 mEq Oral PRN Avani Gregory MD        Or    potassium chloride 10 mEq/100 mL IVPB (Peripheral Line)  10 mEq IntraVENous PRN Avani Gregory MD        amLODIPine (NORVASC) tablet 10 mg  10 mg Oral Daily Rakan Jenkins MD        [START ON 1/7/2024] lisinopril (PRINIVIL;ZESTRIL) tablet 10 mg  10 mg Oral BID Rakan Jenkins MD        metoprolol tartrate (LOPRESSOR) tablet 25 mg  25 mg Oral BID Rakan Jenkins MD   25 mg at 01/05/24 2112

## 2024-01-07 LAB
ANION GAP SERPL CALC-SCNC: 8 MEQ/L (ref 8–16)
BUN SERPL-MCNC: 19 MG/DL (ref 7–22)
CALCIUM SERPL-MCNC: 8.8 MG/DL (ref 8.5–10.5)
CHLORIDE SERPL-SCNC: 105 MEQ/L (ref 98–111)
CO2 SERPL-SCNC: 26 MEQ/L (ref 23–33)
CREAT SERPL-MCNC: 0.9 MG/DL (ref 0.4–1.2)
DEPRECATED RDW RBC AUTO: 49 FL (ref 35–45)
ERYTHROCYTE [DISTWIDTH] IN BLOOD BY AUTOMATED COUNT: 13.9 % (ref 11.5–14.5)
GFR SERPL CREATININE-BSD FRML MDRD: > 60 ML/MIN/1.73M2
GLUCOSE SERPL-MCNC: 123 MG/DL (ref 70–108)
HCT VFR BLD AUTO: 33.3 % (ref 42–52)
HGB BLD-MCNC: 11 GM/DL (ref 14–18)
MAGNESIUM SERPL-MCNC: 2.2 MG/DL (ref 1.6–2.4)
MCH RBC QN AUTO: 31.7 PG (ref 26–33)
MCHC RBC AUTO-ENTMCNC: 33 GM/DL (ref 32.2–35.5)
MCV RBC AUTO: 96 FL (ref 80–94)
PLATELET # BLD AUTO: 289 THOU/MM3 (ref 130–400)
PMV BLD AUTO: 9.4 FL (ref 9.4–12.4)
POTASSIUM SERPL-SCNC: 4.2 MEQ/L (ref 3.5–5.2)
RBC # BLD AUTO: 3.47 MILL/MM3 (ref 4.7–6.1)
SODIUM SERPL-SCNC: 139 MEQ/L (ref 135–145)
WBC # BLD AUTO: 14.3 THOU/MM3 (ref 4.8–10.8)

## 2024-01-07 PROCEDURE — 97163 PT EVAL HIGH COMPLEX 45 MIN: CPT

## 2024-01-07 PROCEDURE — 85027 COMPLETE CBC AUTOMATED: CPT

## 2024-01-07 PROCEDURE — 97166 OT EVAL MOD COMPLEX 45 MIN: CPT

## 2024-01-07 PROCEDURE — 97530 THERAPEUTIC ACTIVITIES: CPT

## 2024-01-07 PROCEDURE — 6370000000 HC RX 637 (ALT 250 FOR IP): Performed by: STUDENT IN AN ORGANIZED HEALTH CARE EDUCATION/TRAINING PROGRAM

## 2024-01-07 PROCEDURE — 36415 COLL VENOUS BLD VENIPUNCTURE: CPT

## 2024-01-07 PROCEDURE — 2580000003 HC RX 258: Performed by: STUDENT IN AN ORGANIZED HEALTH CARE EDUCATION/TRAINING PROGRAM

## 2024-01-07 PROCEDURE — 97116 GAIT TRAINING THERAPY: CPT

## 2024-01-07 PROCEDURE — 80048 BASIC METABOLIC PNL TOTAL CA: CPT

## 2024-01-07 PROCEDURE — 83735 ASSAY OF MAGNESIUM: CPT

## 2024-01-07 PROCEDURE — 99232 SBSQ HOSP IP/OBS MODERATE 35: CPT | Performed by: INTERNAL MEDICINE

## 2024-01-07 PROCEDURE — 97535 SELF CARE MNGMENT TRAINING: CPT

## 2024-01-07 PROCEDURE — 1200000000 HC SEMI PRIVATE

## 2024-01-07 RX ADMIN — ASPIRIN 81 MG: 81 TABLET, COATED ORAL at 20:01

## 2024-01-07 RX ADMIN — MIRTAZAPINE 30 MG: 30 TABLET, FILM COATED ORAL at 20:10

## 2024-01-07 RX ADMIN — AMLODIPINE BESYLATE 10 MG: 10 TABLET ORAL at 07:45

## 2024-01-07 RX ADMIN — SODIUM CHLORIDE, PRESERVATIVE FREE 10 ML: 5 INJECTION INTRAVENOUS at 07:46

## 2024-01-07 RX ADMIN — MAGNESIUM HYDROXIDE 50 ML: 1200 LIQUID ORAL at 06:08

## 2024-01-07 RX ADMIN — FAMOTIDINE 20 MG: 20 TABLET, FILM COATED ORAL at 07:45

## 2024-01-07 RX ADMIN — LISINOPRIL 10 MG: 10 TABLET ORAL at 07:45

## 2024-01-07 RX ADMIN — SODIUM CHLORIDE, PRESERVATIVE FREE 10 ML: 5 INJECTION INTRAVENOUS at 20:00

## 2024-01-07 RX ADMIN — ASPIRIN 81 MG: 81 TABLET, COATED ORAL at 07:45

## 2024-01-07 RX ADMIN — METOPROLOL TARTRATE 25 MG: 25 TABLET, FILM COATED ORAL at 07:45

## 2024-01-07 RX ADMIN — LISINOPRIL 10 MG: 10 TABLET ORAL at 20:01

## 2024-01-07 RX ADMIN — POLYETHYLENE GLYCOL 3350 17 G: 17 POWDER, FOR SOLUTION ORAL at 07:45

## 2024-01-07 RX ADMIN — METOPROLOL TARTRATE 25 MG: 25 TABLET, FILM COATED ORAL at 20:01

## 2024-01-07 RX ADMIN — ACETAMINOPHEN 650 MG: 325 TABLET ORAL at 07:45

## 2024-01-07 RX ADMIN — ACETAMINOPHEN 650 MG: 325 TABLET ORAL at 03:15

## 2024-01-07 RX ADMIN — SODIUM CHLORIDE: 9 INJECTION, SOLUTION INTRAVENOUS at 03:20

## 2024-01-07 ASSESSMENT — PAIN SCALES - GENERAL: PAINLEVEL_OUTOF10: 0

## 2024-01-07 ASSESSMENT — PAIN - FUNCTIONAL ASSESSMENT: PAIN_FUNCTIONAL_ASSESSMENT: ACTIVITIES ARE NOT PREVENTED

## 2024-01-08 VITALS
TEMPERATURE: 97.8 F | SYSTOLIC BLOOD PRESSURE: 130 MMHG | RESPIRATION RATE: 18 BRPM | WEIGHT: 150 LBS | DIASTOLIC BLOOD PRESSURE: 80 MMHG | BODY MASS INDEX: 20.32 KG/M2 | HEIGHT: 72 IN | OXYGEN SATURATION: 96 % | HEART RATE: 72 BPM

## 2024-01-08 LAB
ANION GAP SERPL CALC-SCNC: 8 MEQ/L (ref 8–16)
BUN SERPL-MCNC: 17 MG/DL (ref 7–22)
CALCIUM SERPL-MCNC: 8.7 MG/DL (ref 8.5–10.5)
CHLORIDE SERPL-SCNC: 105 MEQ/L (ref 98–111)
CO2 SERPL-SCNC: 27 MEQ/L (ref 23–33)
CREAT SERPL-MCNC: 0.8 MG/DL (ref 0.4–1.2)
DEPRECATED RDW RBC AUTO: 48.8 FL (ref 35–45)
ERYTHROCYTE [DISTWIDTH] IN BLOOD BY AUTOMATED COUNT: 14.1 % (ref 11.5–14.5)
GFR SERPL CREATININE-BSD FRML MDRD: > 60 ML/MIN/1.73M2
GLUCOSE SERPL-MCNC: 110 MG/DL (ref 70–108)
HCT VFR BLD AUTO: 31.4 % (ref 42–52)
HGB BLD-MCNC: 10.6 GM/DL (ref 14–18)
MCH RBC QN AUTO: 31.9 PG (ref 26–33)
MCHC RBC AUTO-ENTMCNC: 33.8 GM/DL (ref 32.2–35.5)
MCV RBC AUTO: 94.6 FL (ref 80–94)
PLATELET # BLD AUTO: 297 THOU/MM3 (ref 130–400)
PMV BLD AUTO: 9.2 FL (ref 9.4–12.4)
POTASSIUM SERPL-SCNC: 3.9 MEQ/L (ref 3.5–5.2)
RBC # BLD AUTO: 3.32 MILL/MM3 (ref 4.7–6.1)
SODIUM SERPL-SCNC: 140 MEQ/L (ref 135–145)
WBC # BLD AUTO: 8.7 THOU/MM3 (ref 4.8–10.8)

## 2024-01-08 PROCEDURE — 6370000000 HC RX 637 (ALT 250 FOR IP): Performed by: STUDENT IN AN ORGANIZED HEALTH CARE EDUCATION/TRAINING PROGRAM

## 2024-01-08 PROCEDURE — 97530 THERAPEUTIC ACTIVITIES: CPT

## 2024-01-08 PROCEDURE — 97116 GAIT TRAINING THERAPY: CPT

## 2024-01-08 PROCEDURE — 97110 THERAPEUTIC EXERCISES: CPT

## 2024-01-08 PROCEDURE — 85027 COMPLETE CBC AUTOMATED: CPT

## 2024-01-08 PROCEDURE — 80048 BASIC METABOLIC PNL TOTAL CA: CPT

## 2024-01-08 PROCEDURE — 99231 SBSQ HOSP IP/OBS SF/LOW 25: CPT | Performed by: INTERNAL MEDICINE

## 2024-01-08 PROCEDURE — 97535 SELF CARE MNGMENT TRAINING: CPT

## 2024-01-08 PROCEDURE — 36415 COLL VENOUS BLD VENIPUNCTURE: CPT

## 2024-01-08 RX ORDER — ASPIRIN 81 MG/1
81 TABLET ORAL 2 TIMES DAILY
Qty: 42 TABLET | Refills: 0 | Status: SHIPPED | OUTPATIENT
Start: 2024-01-08 | End: 2024-01-29

## 2024-01-08 RX ORDER — HYDROCODONE BITARTRATE AND ACETAMINOPHEN 5; 325 MG/1; MG/1
1 TABLET ORAL EVERY 6 HOURS PRN
Qty: 28 TABLET | Refills: 0 | Status: SHIPPED | OUTPATIENT
Start: 2024-01-08 | End: 2024-01-15

## 2024-01-08 RX ORDER — BISACODYL 5 MG/1
5 TABLET, DELAYED RELEASE ORAL DAILY PRN
Qty: 20 TABLET | Refills: 0 | Status: SHIPPED | OUTPATIENT
Start: 2024-01-08

## 2024-01-08 RX ADMIN — AMLODIPINE BESYLATE 10 MG: 10 TABLET ORAL at 10:16

## 2024-01-08 RX ADMIN — LISINOPRIL 10 MG: 10 TABLET ORAL at 10:16

## 2024-01-08 RX ADMIN — METOPROLOL TARTRATE 25 MG: 25 TABLET, FILM COATED ORAL at 10:16

## 2024-01-08 RX ADMIN — ASPIRIN 81 MG: 81 TABLET, COATED ORAL at 10:16

## 2024-01-08 RX ADMIN — FAMOTIDINE 20 MG: 20 TABLET, FILM COATED ORAL at 10:16

## 2024-01-08 RX ADMIN — ACETAMINOPHEN 650 MG: 325 TABLET ORAL at 10:16

## 2024-01-08 ASSESSMENT — PAIN SCALES - GENERAL
PAINLEVEL_OUTOF10: 4
PAINLEVEL_OUTOF10: 0

## 2024-01-08 ASSESSMENT — PAIN DESCRIPTION - LOCATION: LOCATION: HIP

## 2024-01-08 ASSESSMENT — PAIN DESCRIPTION - DESCRIPTORS: DESCRIPTORS: ACHING

## 2024-01-08 ASSESSMENT — PAIN DESCRIPTION - ORIENTATION: ORIENTATION: LEFT

## 2024-01-08 ASSESSMENT — PAIN - FUNCTIONAL ASSESSMENT: PAIN_FUNCTIONAL_ASSESSMENT: ACTIVITIES ARE NOT PREVENTED

## 2024-01-08 NOTE — PLAN OF CARE
Problem: Discharge Planning  Goal: Discharge to home or other facility with appropriate resources  1/8/2024 1101 by Meredith Gregory RN  Outcome: Progressing  Flowsheets (Taken 1/8/2024 1101)  Discharge to home or other facility with appropriate resources: Identify barriers to discharge with patient and caregiver     Problem: Pain  Goal: Verbalizes/displays adequate comfort level or baseline comfort level  1/8/2024 1101 by Meredith Gregory RN  Outcome: Progressing  Flowsheets (Taken 1/8/2024 1101)  Verbalizes/displays adequate comfort level or baseline comfort level: Encourage patient to monitor pain and request assistance     Problem: ABCDS Injury Assessment  Goal: Absence of physical injury  1/8/2024 1101 by Meredith Gregory RN  Outcome: Progressing  Flowsheets (Taken 1/8/2024 1101)  Absence of Physical Injury: Implement safety measures based on patient assessment     Problem: Safety - Adult  Goal: Free from fall injury  1/8/2024 1101 by Meredith Gregory RN  Outcome: Progressing  Flowsheets (Taken 1/8/2024 1101)  Free From Fall Injury: Instruct family/caregiver on patient safety     Problem: Skin/Tissue Integrity - Adult  Goal: Skin integrity remains intact  1/8/2024 1101 by Meredith Gregory RN  Outcome: Progressing     Problem: Musculoskeletal - Adult  Goal: Return mobility to safest level of function  1/8/2024 1101 by Meredith Gregory RN  Outcome: Progressing  Flowsheets (Taken 1/8/2024 1101)  Return Mobility to Safest Level of Function: Assess patient stability and activity tolerance for standing, transferring and ambulating with or without assistive devices     Problem: Gastrointestinal - Adult  Goal: Maintains or returns to baseline bowel function  1/8/2024 1101 by Meredith Gregory RN  Outcome: Progressing  Flowsheets (Taken 1/7/2024 2205 by Erna Randall, RN)  Maintains or returns to baseline bowel function:   Administer IV fluids as ordered to ensure adequate hydration   Encourage mobilization and activity   
  Problem: Discharge Planning  Goal: Discharge to home or other facility with appropriate resources  1/8/2024 1256 by Meredith Gregory RN  Outcome: Completed     Problem: Pain  Goal: Verbalizes/displays adequate comfort level or baseline comfort level  1/8/2024 1256 by Meredith Gregory RN  Outcome: Completed  Flowsheets (Taken 1/8/2024 1200)  Verbalizes/displays adequate comfort level or baseline comfort level: Encourage patient to monitor pain and request assistance     Problem: ABCDS Injury Assessment  Goal: Absence of physical injury  1/8/2024 1256 by Meredith Gregory RN  Outcome: Completed     Problem: Safety - Adult  Goal: Free from fall injury  1/8/2024 1256 by Meredith Gregory RN  Outcome: Completed     Problem: Skin/Tissue Integrity - Adult  Goal: Skin integrity remains intact  1/8/2024 1256 by Meredith Gregory RN  Outcome: Completed     Problem: Musculoskeletal - Adult  Goal: Return mobility to safest level of function  1/8/2024 1256 by Meredith Gregory RN  Outcome: Completed     Problem: Gastrointestinal - Adult  Goal: Maintains or returns to baseline bowel function  1/8/2024 1256 by Meredith Gregory RN  Outcome: Completed     Problem: Genitourinary - Adult  Goal: Urinary catheter remains patent  1/8/2024 1256 by Meredith Gregory RN  Outcome: Completed     Problem: Metabolic/Fluid and Electrolytes - Adult  Goal: Electrolytes maintained within normal limits  1/8/2024 1256 by Meredith Gregory RN  Outcome: Completed   Care plan reviewed with patient.  Patient verbalize understanding of the plan of care and contribute to goal setting.     
  Problem: Genitourinary - Adult  Goal: Urinary catheter remains patent  Outcome: Progressing  Flowsheets (Taken 1/5/2024 2108)  Urinary catheter remains patent: Assess patency of urinary catheter     Problem: Skin/Tissue Integrity - Adult  Goal: Skin integrity remains intact  Outcome: Progressing     Problem: Pain  Goal: Verbalizes/displays adequate comfort level or baseline comfort level  Outcome: Progressing     
  Problem: Pain  Goal: Verbalizes/displays adequate comfort level or baseline comfort level  1/6/2024 2216 by Rob Rojas RN  Outcome: Progressing  1/6/2024 1833 by Shanti Finley RN  Outcome: Progressing  Flowsheets (Taken 1/6/2024 1833)  Verbalizes/displays adequate comfort level or baseline comfort level:   Encourage patient to monitor pain and request assistance   Assess pain using appropriate pain scale   Administer analgesics based on type and severity of pain and evaluate response   Implement non-pharmacological measures as appropriate and evaluate response   Notify Licensed Independent Practitioner if interventions unsuccessful or patient reports new pain     Problem: Skin/Tissue Integrity - Adult  Goal: Skin integrity remains intact  1/6/2024 2216 by Rob Rojas RN  Outcome: Progressing  Flowsheets (Taken 1/6/2024 2034)  Skin Integrity Remains Intact: Monitor for areas of redness and/or skin breakdown  1/6/2024 1833 by Shanti Finley RN  Outcome: Progressing  Flowsheets (Taken 1/6/2024 1831)  Skin Integrity Remains Intact:   Monitor for areas of redness and/or skin breakdown   Every 4-6 hours minimum: Change oxygen saturation probe site     Problem: Musculoskeletal - Adult  Goal: Return mobility to safest level of function  1/6/2024 2216 by Rob Rojas RN  Outcome: Progressing  1/6/2024 1833 by Shanti Finley RN  Outcome: Progressing  Flowsheets (Taken 1/6/2024 1833)  Return Mobility to Safest Level of Function:   Assess patient stability and activity tolerance for standing, transferring and ambulating with or without assistive devices   Assist with transfers and ambulation using safe patient handling equipment as needed   Ensure adequate protection for wounds/incisions during mobilization   Obtain physical therapy/occupational therapy consults as needed   Instruct patient/family in ordered activity level     
Consult for discharge plan, completed by care manager, see progress note 01/08/24  
  Problem: Musculoskeletal - Adult  Goal: Return mobility to safest level of function  1/7/2024 1028 by Darien Vargas RN  Outcome: Progressing  Flowsheets (Taken 1/7/2024 1028)  Return Mobility to Safest Level of Function:   Assist with transfers and ambulation using safe patient handling equipment as needed   Assess patient stability and activity tolerance for standing, transferring and ambulating with or without assistive devices     Problem: Gastrointestinal - Adult  Goal: Maintains or returns to baseline bowel function  1/7/2024 1028 by Darien Vargas RN  Outcome: Progressing  Flowsheets (Taken 1/7/2024 1028)  Maintains or returns to baseline bowel function: Assess bowel function     Problem: Genitourinary - Adult  Goal: Urinary catheter remains patent  1/7/2024 1028 by Darien Vargas RN  Outcome: Progressing  Flowsheets (Taken 1/7/2024 1028)  Urinary catheter remains patent: Assess patency of urinary catheter     Problem: Metabolic/Fluid and Electrolytes - Adult  Goal: Electrolytes maintained within normal limits  1/7/2024 1028 by Darien Vargas RN  Outcome: Progressing  Flowsheets (Taken 1/7/2024 1028)  Electrolytes maintained within normal limits:   Monitor labs and assess patient for signs and symptoms of electrolyte imbalances   Administer electrolyte replacement as ordered   Monitor response to electrolyte replacements, including repeat lab results as appropriate     Care plan reviewed with patient and patient verbalized understanding of the plan of care and contribute to goal setting.      
of redness and/or skin breakdown   Assess vascular access sites hourly     Problem: Musculoskeletal - Adult  Goal: Return mobility to safest level of function  1/7/2024 2205 by Erna Randall RN  Outcome: Progressing  Flowsheets (Taken 1/7/2024 1028 by Darien Vargas, RN)  Return Mobility to Safest Level of Function:   Assist with transfers and ambulation using safe patient handling equipment as needed   Assess patient stability and activity tolerance for standing, transferring and ambulating with or without assistive devices     Problem: Gastrointestinal - Adult  Goal: Maintains or returns to baseline bowel function  1/7/2024 2205 by Erna Randall RN  Outcome: Progressing  Flowsheets (Taken 1/7/2024 2205)  Maintains or returns to baseline bowel function:   Administer IV fluids as ordered to ensure adequate hydration   Encourage mobilization and activity   Encourage oral fluids to ensure adequate hydration   Administer ordered medications as needed   Assess bowel function     Problem: Genitourinary - Adult  Goal: Urinary catheter remains patent  1/7/2024 2205 by Erna Randall, RN  Outcome: Progressing  Flowsheets (Taken 1/7/2024 2205)  Urinary catheter remains patent: Assess patency of urinary catheter     Problem: Metabolic/Fluid and Electrolytes - Adult  Goal: Electrolytes maintained within normal limits  1/7/2024 2205 by Erna Randall, RN  Outcome: Progressing  Flowsheets (Taken 1/7/2024 2205)  Electrolytes maintained within normal limits:   Monitor labs and assess patient for signs and symptoms of electrolyte imbalances   Administer electrolyte replacement as ordered   Monitor response to electrolyte replacements, including repeat lab results as appropriate   Care plan reviewed with patient.  Patient verbalize understanding of the plan of care and contribute to goal setting.      
setting.     Urinary catheter remains patent: Assess patency of urinary catheter

## 2024-01-08 NOTE — DISCHARGE INSTRUCTIONS
Dr Armendariz  Discharge Instructions     To prevent Clot formation, you have been placed on an anticoagulant, aspirin, take as written  Surgical Site Care:  Okay to remove dressing to hip in seven days and leave open to air, can place dry dressing as needed if noted drainage at incision upon removal of bandage  Sutures may be present and will likely be removed at 2 week follow up   Okay to shower once dressing is removed   No tub soaks   Physical Therapy:  Full Weight Bearing Status  Precautions   Per Physical Therapy handout  Pain Medications  You were given pain medication  Wean off pain medications as you deem appropriate as long as pain is under control  Cold packs/Ice packs/Machine  May be used 3 times daily for 15-30 minutes as necessary  Be sure to have a barrier (cloth, clothing, towel) between the site and the ice pack to prevent frostbite  Contact [facility] office if  Increased redness, swelling, drainage of any kind, and/or pain to surgery site.  As well as new onset fevers and or chills.  These could signify an infection.  Calf or thigh tenderness to touch as well as increased swelling or redness.  This could signify a clot formation.  Numbness or tingling to an area around the incision site or below the incision site (toes).  Any rash appears, increased  or new onset nausea/vomiting occur.  This may indicate a reaction to a medication.   Phone # [phone] - [facility]  Follow up with Surgeon at scheduled appointment time.

## 2024-01-08 NOTE — DISCHARGE INSTR - COC
Closure [place];Sutures 24 0928   Drainage Amount None (dry) 24 2008   Odor None 24 1058   Number of days: 2        Elimination:  Continence:   Bowel: {YES / NO:}  Bladder: {YES / NO:}  Urinary Catheter: {Urinary Catheter:490755466}   Colostomy/Ileostomy/Ileal Conduit: {YES / NO:}       Date of Last BM: ***    Intake/Output Summary (Last 24 hours) at 2024 0917  Last data filed at 2024 0907  Gross per 24 hour   Intake 840 ml   Output 3225 ml   Net -2385 ml     I/O last 3 completed shifts:  In: 1460 [P.O.:1460]  Out: 3725 [Urine:3725]    Safety Concerns:     { MICHAEL Safety Concerns:325038628}    Impairments/Disabilities:      { MICHAEL Impairments/Disabilities:668066867}    Nutrition Therapy:  Current Nutrition Therapy:   { MICHAEL Diet List:024667685}    Routes of Feeding: {[facility] Other Feedings:635695717}  Liquids: {Slp liquid thickness:92392}  Daily Fluid Restriction: {[facility] DME Yes amt example:563502200}  Last Modified Barium Swallow with Video (Video Swallowing Test): {Done Not Done Date:}    Treatments at the Time of Hospital Discharge:   Respiratory Treatments: ***  Oxygen Therapy:  {Therapy; copd oxygen:09756}  Ventilator:    { CC Vent List:672652121}    Rehab Therapies: {THERAPEUTIC INTERVENTION:7045046935}  Weight Bearing Status/Restrictions: {[facility] Weight Bearin}  Other Medical Equipment (for information only, NOT a DME order):  {EQUIPMENT:609964921}  Other Treatments: ***    Patient's personal belongings (please select all that are sent with patient):  {[facility] DME Belongings:029677476}    RN SIGNATURE:  {Esignature:708239345}    CASE MANAGEMENT/SOCIAL WORK SECTION    Inpatient Status Date: ***    Readmission Risk Assessment Score:  Readmission Risk              Risk of Unplanned Readmission:  11           Discharging to Facility/ Agency   Name:   Address:  Phone:  Fax:    Dialysis Facility (if applicable)   Name:  Address:  Dialysis

## 2024-01-08 NOTE — DISCHARGE SUMMARY
Exam:  Please see final progress note for appropriate discharge exam    Disposition: home with [facility]    In process/preliminary results:  Outstanding Order Results       Date and Time Order Name Status Description    1/6/2024  5:42 AM PREPARE RBC (CROSSMATCH), 2 Units Preliminary             Patient Instructions:   Current Discharge Medication List        START taking these medications    Details   aspirin 81 MG EC tablet Take 1 tablet by mouth 2 times daily for 21 days  Qty: 42 tablet, Refills: 0      bisacodyl (DULCOLAX) 5 MG EC tablet Take 1 tablet by mouth daily as needed for Constipation  Qty: 20 tablet, Refills: 0      HYDROcodone-acetaminophen (NORCO) 5-325 MG per tablet Take 1 tablet by mouth every 6 hours as needed for Pain for up to 7 days. Intended supply: 7 days. Take lowest dose possible to manage pain Max Daily Amount: 4 tablets  Qty: 28 tablet, Refills: 0    Comments: Reduce doses taken as pain becomes manageable  Associated Diagnoses: Closed fracture of left hip, initial encounter ([facility]); Post-op pain           CONTINUE these medications which have NOT CHANGED    Details   mirtazapine (REMERON) 30 MG tablet Take 1 tablet by mouth nightly  Qty: 30 tablet, Refills: 5      sod chloride IRR soln 0.9 % irrigation FLUSH ORTEGA CATH WITH 50 MLS AS NEEDED  Qty: 3000 mL, Refills: 2      Water For Irrigation, Sterile (STERILE WATER FOR IRRIGATION) Irrigate ortega catheter with  mls of sterile water as needed to maintain patency.  Qty: 3000 mL, Refills: 11      metoprolol tartrate (LOPRESSOR) 25 MG tablet Take 1 tablet by mouth 2 times daily  Qty: 180 tablet, Refills: 3    Associated Diagnoses: Primary hypertension      lisinopril (PRINIVIL;ZESTRIL) 10 MG tablet Take 1 tablet by mouth 2 times daily  Qty: 180 tablet, Refills: 3    Associated Diagnoses: Primary hypertension      amLODIPine (NORVASC) 10 MG tablet Take 1 tablet by mouth daily  Qty: 90 tablet, Refills: 3    Associated Diagnoses: Primary hypertension

## 2024-01-08 NOTE — CARE COORDINATION
1/8/24, 10:49 AM EST    DISCHARGE PLANNING EVALUATION    Consult for discharge plan, plans home with wife, [facility],  care manager has completed assessment and referral.   
Spoke with patient and spouse who advised do not have ACP documents at home. Would like to complete and have attached to chart this admission. Denied concerns or needs.   
reviewed with patient/ family.  Patient/ family verbalize understanding of discharge plan and are in agreement with goal/plan/treatment preferences.  Understanding was demonstrated using the teach back method.  AVS provided by RN at time of discharge, which includes all necessary medical information pertaining to the patients current course of illness, treatment, post-discharge goals of care, and treatment preferences.     Services At/After Discharge: DME and Home Health        IMM Letter  IMM Letter given to Patient/Family/Significant other/Guardian/POA/by:: Margarita MENG   IMM Letter date given:: 01/08/24  IMM Letter time given:: 1002       Ap is from home with his wife. He has a walking staff and chronic ortega supplies at baseline. He is independent and drives at baseline; his wife will be transporting home with post-op restrictions. New HH orders - PT/OT, SN. Pt and his wife would like [facility] in order to \"keep everything [facility] with My[facility]\". Called [facility] and notified pt that SN will be out at the end of the week, therapy likely early next week. They would still like to proceed with [facility]. Referral was made.     Called jame from [facility] to notify of DME orders for hip kit, commode and RW. He states he will deliver to the bedside.   
Additional Notes: Patient consent was obtained to proceed with the visit and recommended plan of care after discussion of all risks and benefits, including the risks of COVID-19 exposure.
Detail Level: Simple

## 2024-01-09 ENCOUNTER — CARE COORDINATION (OUTPATIENT)
Dept: CASE MANAGEMENT | Age: 89
End: 2024-01-09

## 2024-01-09 DIAGNOSIS — S72.002A CLOSED FRACTURE OF LEFT HIP, INITIAL ENCOUNTER (HCC): Primary | ICD-10-CM

## 2024-01-09 PROCEDURE — 1111F DSCHRG MED/CURRENT MED MERGE: CPT | Performed by: INTERNAL MEDICINE

## 2024-01-09 NOTE — CARE COORDINATION
your follow up appointment?: Yes  How are you going to get to your appointment?: Car - family or friend to transport  Do you have support at home?: Partner/Spouse/SO  Do you feel like you have everything you need to keep you well at home?: Yes  Are you an active caregiver in your home?: No  Care Transitions Interventions     Transportation Support: Declined            Discussed follow-up appointments. If no appointment was previously scheduled, appointment scheduling offered: Yes.   Is follow up appointment scheduled within 7 days of discharge? No.    Follow Up    Future Appointments         Provider Specialty Dept Phone    1/18/2024 1:00 PM Josey Shin, APRN - CNP Urology [phone]    1/23/2024 1:15 PM Med Gay MD Internal Medicine [phone]    1/26/2024 11:10 AM Terry rAmendariz, DO      1/15/2025 1:15 PM Regan Ventura MD Urology [phone]              Care Transition Nurse provided contact information.  Plan for follow-up call in 5-7 days based on severity of symptoms and risk factors.  Plan for next call: symptom management-new or worsening symptoms, s/p hip surgery, pain?, BM's?  [facility] following?    Martha Verma RN

## 2024-01-10 ENCOUNTER — TELEPHONE (OUTPATIENT)
Dept: INTERNAL MEDICINE CLINIC | Age: 89
End: 2024-01-10

## 2024-01-10 NOTE — TELEPHONE ENCOUNTER
Have the medications prescribed at discharge been filled? ***  Does the patient understand what the medications are for? ***  Has the patient experienced any new symptoms or have previous symptoms worsened? ***  Is the patient experiencing any pain? *** If yes, is the pain well controlled? ***  We want to be sure the patient has the best recovery possible. Does the patient understand all the discharge instructions? ***  Did someone talk to the patient and/or family about the patient needs prior to being discharged? ***  Did the patient's doctor communicate well? ***  Did the patient's nurse communicate well? ***  Was the patient satisfied with the services and care received at [facility]? ***

## 2024-01-10 NOTE — TELEPHONE ENCOUNTER
[facility] Transitions Initial Follow Up Call    Call within 2 business days of discharge: Yes     Patient: Phillip Bates Patient : 1935   MRN: [id number]  Reason for Admission: Closed Hip fracture  Discharge Date: 24 RARS: Readmission Risk Score: 14.7       Spoke with: Phillip    Discharge department/facility: [facility]    Non-face-to-face services provided:  Pt compiance wit meds and making sure he understands what is going on and checking if has any questions.      Follow Up  Future Appointments   Date Time Provider Department Center   2024  1:00 PM Josey Shin, APRN - CNP [place] [facility] [place]   2024  1:15 PM Med Gay MD [facility] [place]   1/15/2025  1:15 PM Regan Ventura MD [place] [facility] [place]       SANTY KOENIG MA

## 2024-01-10 NOTE — PROGRESS NOTES
Internal Medicine Resident Progress Note    Name: Phillip Bates, male, : 1935, MRN: [id number]    PCP: Med Gay MD    Date of Admission: 2024  Date of Service: Pt seen/examined on 24      Assessment/Plan:  Left femoral neck fracture iso mechanical fall, s/p left hip hemiarthroplasty: Ortho primary, seen for left hip hemiarthroplasty morning of .  Care per primary  Pain management with acetaminophen  PT/OT following,  Hypertension: Patient blood pressure elevated on admission, likely 2/2 prior leukocytosis and femoral neck fracture.  Patient blood pressure well-controlled on home regimen at this time.  Continue regimen at this time, hold parameters in place  Leukocytosis, likely reactive: Patient afebrile since admission  We will continue to monitor for signs and symptoms of infection  No antibiotics likely require this to  Chronic  Hyperlipidemia: No current statin  Dementia: Patient declines opioids at this time due to concerns for increased risk of fusion  Resolved  Hypokalemia        Expected discharge date: TBD    Disposition:   [x] Home  [] Inpatient Rehab  [] Psychiatric Unit  [] SNF  [] Long Term Care Facility  [] Other-    ===================================================================      Chief Complaint: Hip fracture 2/2 mechanical fall    Hospital Course:   Patient is a 88-year-old male, medical history of hypertension, hyperlipidemia, admitted to [facility] for mechanical fall resulting in left femoral neck fracture.  Ortho primary, medicine team consulted for preoperative risk assessment and medical management of comorbidities.    Subjective (past 24 hours):   Patient seen and examined at bedside.  Patient s/p left hip hemiarthroplasty.  Patient tolerated procedure well, states no significant pain at this time.  Patient denies any numbness/tingling in lower extremity, but does note mild pain with flexion at hip.  She also notes passing gas at this time.  Patient 
   01/05/24 1633   Encounter Summary   Encounter Overview/Reason  Spiritual/Emotional Needs;Initial Encounter   Service Provided For: Patient and family together   Referral/Consult From: AdExtent   Support System Spouse;Children   Last Encounter  01/05/24  (ACP)   Complexity of Encounter Moderate   Begin Time 1610   End Time  1633   Total Time Calculated 23 min   Spiritual/Emotional needs   Type Spiritual Support   Advance Care Planning   Type ACP conversation   Assessment/Intervention/Outcome   Assessment Calm   Intervention Active listening;Empowerment;Nurtured Hope;Prayer (assurance of)/[place]   Outcome Comfort     Assessment:  During my encounter with the 88 yr old patient, I gave the patient a copy of the Advance Directive as requested. I assess the pt's spiritual needs. I also came to assess the patient and their family with any spiritual needs. The pt was admitted due to left displaced femoral neck fracture.     Interventions:  I gave the pt the requested documents and gave a brochure and asked if the patient had any questions.   I offered words of comfort and prayer.  provided a listening presence and encouraged pt to share their beliefs and how they support him during their hospitalization.       Outcomes:  The pt and the family were thankful for the spiritual support. They shared that they would complete the forms after reviewed.     Plan:  Chaplains will follow-up at a later time in order to assist the patient as they complete the Advance Directive documents.  The Chaplains will be available to provide further emotional support per request.  
  Physician Progress Note      PATIENT:               PADMINI CORDOBA  CSN #:                  [id number]  :                       1935  ADMIT DATE:       2024 10:50 AM  DISCH DATE:        2024 12:33 PM  RESPONDING  PROVIDER #:        DONNY CARMEN          QUERY TEXT:    Pt admitted with left femoral neck fracture. Pt noted to have demineralized   bones documented in XR Hip with Pelvis Left . If possible, please   document in progress notes and discharge summary if you are evaluating and/or   treating any of the following:    The medical record reflects the following:  Risk Factors: Age 88, hx of prostate CA wiht brachytherapy seeds present,   demineralized bones  Clinical Indicators: XR Hip with Pelvis Left  \"Acute angulated and   displaced fracture of the left femoral neck. The bones are demineralized.\"    ER- presents to the ED for evaluation of hip injury, fall.  Patient reports   missing a step, falling 1 step and onto his left hip  H&P- left femoral neck fracture after a mechanical fall earlier today. Acute   angulated and displaced fracture of the left femoral neck.    Treatment: s/p Left Hip Hemiarthroplasty, PT, Multiple vitamin PTA    Thank you,  Phoebe Truong RN  Clinical Documentation  [phone], or via Perfect Serve  Options provided:  -- Osteoporotic left femoral neck fracture following fall which would not   usually break a normal, healthy bone  -- Traumatic left femoral neck fracture  -- Other - I will add my own diagnosis  -- Disagree - Not applicable / Not valid  -- Disagree - Clinically unable to determine / Unknown  -- Refer to Clinical Documentation Reviewer    PROVIDER RESPONSE TEXT:    This patient has a traumatic left femoral neck fracture.    Query created by: Phoebe Truong on 2024 2:52 PM      Electronically signed by:  DONNY CARMEN 1/10/2024 1:04 PM          
 [facility]  INPATIENT PHYSICAL THERAPY  DAILY NOTE  [facility] ORTHOPEDICS 7K - 7K-28/028-A    Time In: 1004  Time Out: 1030  Timed Code Treatment Minutes: 26 Minutes  Minutes: 26          Date: 2024  Patient Name: Phillip Bates,  Gender:  male        MRN: [id number]  : 1935  (88 y.o.)     Referring Practitioner: Dr. DAISY Armendariz  Diagnosis: left displaced femoral neck fracture  Additional Pertinent Hx: 88 y.o. male who presents with left hip pain after a mechanical fall down the steps this morning. Did not hit head, no loc, immediate L hip pain and inability to weight bear. Imaging in ED revealed a left femoral neck fracture for which orthopaedics was consulted. Pain is to the left groin, worsened with palpation ROM weight bearing, relieved by immobilization, no paresthesias or weakness, non radiating, no other msk complaints. s/p left hemiarthroplasty on 24.     Prior Level of Function:  Lives With: Spouse  Type of Home: House  Home Layout: Two level, Bed/Bath upstairs (Pt. reports he has a bathroom without shower on first floor but has couches/chairs he says he could sleep on.)  Home Access: Stairs to enter without rails  Entrance Stairs - Number of Steps: 1 MYRON, 13 steps to/from second story  Home Equipment: Grab bars (walking stick)   Bathroom Shower/Tub: Tub/Shower unit  Bathroom Toilet: Standard  Bathroom Equipment: Grab bars in shower  Bathroom Accessibility: Accessible    ADL Assistance: Independent  Homemaking Assistance: Independent  Ambulation Assistance: Independent  Transfer Assistance: Independent  Active : Yes (prior to surgery; wife will transport post-op.)  Additional Comments: Pt. reports independence with ADLs/IADLs/mobility/transfers without use of AD.  Pt. has supportive spouse.    Restrictions/Precautions:  Restrictions/Precautions: Weight Bearing, Fall Risk, General Precautions  Left Lower Extremity Weight Bearing: Weight Bearing As Tolerated  Position 
0936  - pt arrives to pacu, respirations unlabored on 2 L NC, pt denies pain, VSS    0950 - xray at bedside    1000 - pt alert and moving around, pt does not respond to orientation questions, pt still waking up from anesthesia    1010 - Dr. Alvarez at bedside, no new orders, pt okay to go back to room    1015 - pt responding, pt following commands    1020 -report called to Shanti MENG    1030 - pt meets criteria for discharge from pacu at this time    1040 - pt transported to [facility] in stable condition     
[facility] ORTHOPEDICS 7K  Occupational Therapy  Daily Note  Time:   Time In: 742  Time Out: 820  Timed Code Treatment Minutes: 38 Minutes  Minutes: 38          Date: 2024  Patient Name: Phillip Bates,   Gender: male      Room: [facility]28/028-A  MRN: [id number]  : 1935  (88 y.o.)  Referring Practitioner: Terry Armendariz DO  Diagnosis: closed hip fracture of L hip  Additional Pertinent Hx: Pt. is an 88 year male whom presented with left displaced femoral neck fracture s/p fall in which treated surgically with left hemiarthroplasty on 24.  Pt. has posterior hip precautions.    Restrictions/Precautions:  Restrictions/Precautions: Weight Bearing, Fall Risk, General Precautions  Left Lower Extremity Weight Bearing: Weight Bearing As Tolerated  Position Activity Restriction  Hip Precautions: Posterior hip precautions, No hip flexion > 90 degrees, No ADduction, No hip internal rotation     SUBJECTIVE: RN okayed OT session. Upon arrival patient was sitting up in bed. Pt was agreeable to OT session.     PAIN: 5/10: LLE    Vitals: Vitals not assessed per clinical judgement, see nursing flowsheet    Vitals:    24 0430   BP: (!) 143/74   Pulse: 81   Resp: 18   Temp: 98.4 °F (36.9 °C)   SpO2: 95%       COGNITION: Slow Processing, Decreased Problem Solving, and Decreased Safety Awareness    ADL:   Lower Extremity Dressing: Minimal Assistance and X 1.  To don brief with reacher use while sitting EOB. Pt required min vcs for reacher technique.   Footwear Management: Maximum Assistance.  Socks .    BALANCE:  Sitting Balance:  Stand By Assistance. Sitting EOB with 2 UE release from EOB.   Standing Balance: Contact Guard Assistance.      BED MOBILITY:  Supine to Sit: Minimal Assistance, X 1, with head of bed raised, with rail, with increased time for completion    Scooting: Stand By Assistance to scoot B hips to EOB.     TRANSFERS:  Sit to Stand:  Contact Guard Assistance.    Stand to 
[facility]  OCCUPATIONAL THERAPY NOTE  [facility] ORTHOPEDICS 7K  7K-28/028-A      Date: 2024  Patient Name: Phillip Bates        CSN: [id number]   : 1935  (88 y.o.)  Gender: male   Referring Practitioner: Terry Armendariz DO  Diagnosis: closed hip fracture of L hip                     Phillip Bates requires a bedside commode due to being confined to one level of the home and there are no toilet facilities on that level, and is physically incapable of utilizing regular toilet facilities. Current body weight: Weight - Scale: 68 kg (150 lb).   
O2 saturation dropping down into the 80's when sleeping. Put O2 issa on per N/C at 2 Liters.  
[facility]  INPATIENT PHYSICAL THERAPY  EVALUATION  [facility] ORTHOPEDICS 7K - 7K-28/028-A    Time In: 1034  Time Out: 1106  Timed Code Treatment Minutes: 23 Minutes  Minutes: 32          Date: 2024  Patient Name: Phillip Bates,  Gender:  male        MRN: [id number]  : 1935  (88 y.o.)      Referring Practitioner: Dr. DAISY Armendariz  Diagnosis: left displaced femoral neck fracture  Additional Pertinent Hx: 88 y.o. male who presents with left hip pain after a mechanical fall down the steps this morning. Did not hit head, no loc, immediate L hip pain and inability to weight bear. Imaging in ED revealed a left femoral neck fracture for which orthopaedics was consulted. Pain is to the left groin, worsened with palpation ROM weight bearing, relieved by immobilization, no paresthesias or weakness, non radiating, no other msk complaints. s/p left hemiarthroplasty on 24.     Restrictions/Precautions:  Restrictions/Precautions: Weight Bearing, Fall Risk, General Precautions  Left Lower Extremity Weight Bearing: Weight Bearing As Tolerated  Position Activity Restriction  Hip Precautions: Posterior hip precautions, No hip flexion > 90 degrees, No ADduction, No hip internal rotation    Subjective:  Chart Reviewed: Yes  Patient assessed for rehabilitation services?: Yes  Subjective: pleasant, cooperative, motivated. wife present and supportive.    General:        Hearing: Within functional limits       Pain: no pain at rest, left hip pain with mobility but not rated    Vitals: Vitals not assessed per clinical judgement, see nursing flowsheet    Social/Functional History:    Lives With: Spouse  Type of Home: House  Home Layout: Two level, Bed/Bath upstairs (Pt. reports he has a bathroom without shower on first floor but has couches/chairs he says he could sleep on.)  Home Access: Stairs to enter without rails  Entrance Stairs - Number of Steps: 1 MYRON, 13 steps to/from second story  Home Equipment: Grab 
Orthopaedic Progress Note      SUBJECTIVE   Mr. Bates is post op day # 1     Patient notes postop left displaced femoral neck fracture treated with left hemiarthroplasty.      OBJECTIVE      Physical    VITALS:  BP (!) 134/57   Pulse 66   Temp 97.8 °F (36.6 °C) (Oral)   Resp 16   Ht 1.829 m (6')   Wt 68 kg (150 lb)   SpO2 96%   BMI 20.34 kg/m²   I/O last 3 completed shifts:  In: 2510 [P.O.:1510; I.V.:1000]  Out: 3750 [Urine:3400; Blood:350]      Dressing is dry and intact.  Abduction pillow is in place.  Neurovasc intact sensation intact.  He is able to flex and extend toes and ankle.  Denies any pain at this time      Data  CBC:   Lab Results   Component Value Date/Time    WBC 14.3 01/07/2024 07:38 AM    HGB 11.0 01/07/2024 07:38 AM     01/07/2024 07:38 AM     BMP:    Lab Results   Component Value Date/Time     01/05/2024 12:50 PM    K 4.0 01/06/2024 05:42 AM    K 3.3 01/05/2024 12:50 PM     01/05/2024 12:50 PM    CO2 26 01/05/2024 12:50 PM    BUN 14 01/05/2024 12:50 PM    CREATININE 0.9 01/05/2024 12:50 PM    CALCIUM 9.9 01/05/2024 12:50 PM    GLUCOSE 134 01/05/2024 12:50 PM    GLUCOSE 85 05/17/2012 08:05 AM     Uric Acid:  No components found for: \"URIC\"  PT/INR:    Lab Results   Component Value Date/Time    INR 1.02 01/05/2024 12:50 PM     Troponin:  No results found for: \"TROPONINI\"  Urine Culture:  No components found for: \"CURINE\"      Current Inpatient Medications    Current Facility-Administered Medications: aspirin EC tablet 81 mg, 81 mg, Oral, BID  0.9 % sodium chloride infusion, , IntraVENous, Continuous  sodium chloride flush 0.9 % injection 5-40 mL, 5-40 mL, IntraVENous, 2 times per day  sodium chloride flush 0.9 % injection 5-40 mL, 5-40 mL, IntraVENous, PRN  0.9 % sodium chloride infusion, , IntraVENous, PRN  morphine (PF) injection 2 mg, 2 mg, IntraVENous, Q2H PRN **OR** morphine (PF) injection 4 mg, 4 mg, IntraVENous, Q2H PRN  ondansetron (ZOFRAN-ODT) disintegrating 
Orthopaedic Progress Note      SUBJECTIVE   Mr. Bates is post op day # 2 L hip hanna    Seen at bedside this morning  no adverse events overnight  Pain well controlled, tolerating oral diet  Denies any numbness/paresthesia in operative extremity  Following current ortega protocol   Wife bedside    OBJECTIVE      Physical    VITALS:  BP (!) 143/74   Pulse 81   Temp 98.4 °F (36.9 °C) (Oral)   Resp 18   Ht 1.829 m (6')   Wt 68 kg (150 lb)   SpO2 95%   BMI 20.34 kg/m²   I/O last 3 completed shifts:  In: 1460 [P.O.:1460]  Out: 3725 [Urine:3725]    4/10 pain  Gen: alert and oriented  Head: normorcephalic, atraumatic  Resp: unlabored, room air  Pelvis: stable  LLE: incision c/d/I, no drainage, no bandage saturation  Sensation to light touch intact  Gastroc TA EHL intact  Distal pulses palpable, warm well perfused  Calf supple nontender to palpation       Data  CBC:   Lab Results   Component Value Date/Time    WBC 8.7 01/08/2024 06:42 AM    HGB 10.6 01/08/2024 06:42 AM     01/08/2024 06:42 AM     BMP:    Lab Results   Component Value Date/Time     01/08/2024 06:42 AM    K 3.9 01/08/2024 06:42 AM    K 3.3 01/05/2024 12:50 PM     01/08/2024 06:42 AM    CO2 27 01/08/2024 06:42 AM    BUN 17 01/08/2024 06:42 AM    CREATININE 0.8 01/08/2024 06:42 AM    CALCIUM 8.7 01/08/2024 06:42 AM    GLUCOSE 110 01/08/2024 06:42 AM    GLUCOSE 85 05/17/2012 08:05 AM     Uric Acid:  No components found for: \"URIC\"  PT/INR:    Lab Results   Component Value Date/Time    INR 1.02 01/05/2024 12:50 PM     Troponin:  No results found for: \"TROPONINI\"  Urine Culture:  No components found for: \"CURINE\"      Current Inpatient Medications    Current Facility-Administered Medications: aspirin EC tablet 81 mg, 81 mg, Oral, BID  0.9 % sodium chloride infusion, , IntraVENous, Continuous  sodium chloride flush 0.9 % injection 5-40 mL, 5-40 mL, IntraVENous, 2 times per day  sodium chloride flush 0.9 % injection 5-40 mL, 5-40 mL, 
Patient anticipates going home at discharge and will need a walker, bedside commode, and interested in a hip kit. Wife did question a hospital bed due to bedroom being upstairs.  
Patient arrived with a ortega catheter to a leg bag, switched to standard bag. This is a chronic ortega and follows with Dr. Ventura from urology. Patient states he irrigates in the am and at night. Wife is to let us know how much irrigation to use when she goes home and checks syringe.  
Patient educated on how to use incentive spirometer. Patient verbalized understanding and demonstrated proper use. Emphasized importance and usage of device, with coughing and deep breathing every 2 hours while awake.        
Phillip Bates was evaluated today and a DME order was entered for a wheeled walker because he requires this to successfully complete daily living tasks of toileting, personal cares, and ambulating.  A wheeled walker is necessary due to the patient's unsteady gait, upper body weakness, and inability to  an ambulation device; and he can ambulate only by pushing a walker instead of a lesser assistive device such as a cane, crutch, or standard walker.  The need for this equipment was discussed with the patient and he understands and is in agreement.     
Pt admitted to  7K28 from ED and via cart/stretcher.     Complaints: left displaced femoral neck fracture.      New INT into the forearm right, condition patent and no redness. Previous IV site infiltrated. Vital signs obtained. Assessment and data collection initiated.     Two nurse skin assessment performed by Maryjane MENG and Meredith MENG. Oriented to room.     Explained patients right to have family, representative or physician notified of their admission.  Patient has Requested for physician to be notified.  Patient has Declined for family/representative to be notified.    The patient is interested in [facility] meds to beds program?:  No    Policies and procedures for  explained. All questions answered with no further questions at this time. Fall prevention and safety brochure discussed with patient.  Bed alarm on. Call light in reach.       
Pt follows Urology for chronic retention. Pt had chronic ortega upon admission. Pt requested the ortega be changed prior to discharge to prevent from having to go to office on Friday. Urology gave permission, called and asked RN to exchange ortega.     Pt ortega was changed prior to discharge, 20 Syriac ortega catheter was placed with drainage bag. Catheter patent and draining, free of dependent loops, below bladder, secured to thigh.     Pt was instructed to call the Urology office to set up next appt.   
Pt was discharged via wheelchair by transport to private car with all his belongings.   
Returned from surgery pr bed.Removed oxygen. O@ saturation remains above 91%.  
To OR per bed.  
medicine team consulted for preoperative risk assessment and medical management of comorbidities.    1/6: Patient s/p left hip hemiarthroplasty.  Patient tolerated procedure well, states no significant pain at this time.  Patient denies any numbness/tingling in lower extremity, but does note mild pain with flexion at hip.  She also notes passing gas at this time.  Patient denies headache, chest pain, shortness of breath.    Subjective (past 24 hours):   Patient seen and examined at bedside.  No acute overnight events.  Patient notes no chest pain, shortness of breath, nausea, vomiting, abdominal pain at this time.  Patient states that pain is well-controlled, states only 4/10 intensity with weightbearing exercises, otherwise 0/10 pain.  Patient to discuss possible Garduno catheter placement while inpatient instead of having to follow-up with outpatient urology on 1/12    ROS: reviewed complete ROS unchanged unless otherwise stated in hospital course/subjective portion.       Medications:  Reviewed    aspirin, 81 mg, Oral, BID    sodium chloride flush, 5-40 mL, IntraVENous, 2 times per day    acetaminophen, 650 mg, Oral, Q6H    polyethylene glycol, 17 g, Oral, Daily    famotidine, 20 mg, Oral, Daily    amLODIPine, 10 mg, Oral, Daily    lisinopril, 10 mg, Oral, BID    metoprolol tartrate, 25 mg, Oral, BID    mirtazapine, 30 mg, Oral, Nightly         Intake/Output Summary (Last 24 hours) at 1/7/2024 1311  Last data filed at 1/7/2024 0952  Gross per 24 hour   Intake 1270 ml   Output 2575 ml   Net -1305 ml         Exam:  /62   Pulse 65   Temp 97.8 °F (36.6 °C) (Oral)   Resp 16   Ht 1.829 m (6')   Wt 68 kg (150 lb)   SpO2 98%   BMI 20.34 kg/m²     General: No distress, appears stated age. Patient sitting in chair. Garduno catheter in place.  Eyes:  PERRL. Conjunctivae/corneas clear.  HENT: Head normal appearing. Nares normal. Oral mucosa moist.  Hearing intact.   Neck: Supple, with full range of motion. Trachea 
Gait training, Endurance training, Neuromuscular re-education, Safety education & training, Balance training, Coordination training.  See long-term goal time frame for expected duration of plan of care.  If no long-term goals established, a short length of stay is anticipated.    Goals:  Patient goals : to move around better  Short Term Goals  Time Frame for Short Term Goals: by time of d/c  Short Term Goal 1: Pt. to navigate bathroom distances with SBA with RW to improve engagement with ADLs.  Short Term Goal 2: Pt. to demo Min A to transfer to/from tub/shower combo (with/without use of TTB) to maximize engagement with ADL transfers.  Short Term Goal 3: Pt. to demo Supervision to transfer to/from toilet with 0 cues to progress to PLOF.  Short Term Goal 4: Pt. to tolerate standing up to 8 min 1-2 hand release Supervision to promote engagement in IADLs.  Short Term Goal 5: Pt. to demo Min A for LB dress/footwear management with use of AE prn to optimize performance with LB ADLs.  Long Term Goals  Time Frame for Long Term Goals : no LTG d/t ELOS         Following session, patient left in safe position with all fall risk precautions in place.               
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  Social History:  reports that he quit smoking about 56 years ago. His smoking use included cigarettes. He has never been exposed to tobacco smoke. He has never used smokeless tobacco. He reports that he does not drink alcohol and does not use drugs.   The patient currently lives at home with wife  Family History: family history includes Diabetes in his father.     EKG:  I have reviewed the EKG with the following interpretation: Compared to EKG from 1/17/2023, interpreted by me, normal sinus rhythm with left axis deviation, ventricular rate of 91, AZ interval 184 ms, QRS duration 90 ms, QTc 472 ms, no acute ischemic changes, stable EKG when compared to old one.      DVT prophylaxis: SCDs per primary  Diet: ADULT DIET; Regular  Fluids: NS at 75 ml/hour per primary  Code Status: Full Code  PT/OT Eval Status: Consulted and following patient    Electronically signed by Moose Salcedo MD on 1/8/2024 at 8:11 AM    Case was discussed with Attending, Dr. Gay

## 2024-01-10 NOTE — TELEPHONE ENCOUNTER
Have the medications prescribed at discharge been filled? yes  Does the patient understand what the medications are for? Yes    Has the patient experienced any new symptoms or have previous symptoms worsened? Yes.  Pt has had a diarrhea daily a couple of times per day.   He is stopping the coffee and wife has given him peptobismol.I advised pt to report back to me or home Health nurse who will be seeing him tomorrow for the first time.  He also inquired about catheter change.  Wonders if he will be able to go to urology office.  I advised him to run this by the home health nurse as she may be able to do it when time.    Is the patient experiencing any pain? No If yes, is the pain well controlled? yes  We want to be sure the patient has the best recovery possible. Does the patient understand all the discharge instructions? yes  Did someone talk to the patient and/or family about the patient needs prior to being discharged? yes  Did the patient's doctor communicate well? yes  Did the patient's nurse communicate well? yes  Was the patient satisfied with the services and care received at [facility]? Yes.  \"Everyone ws just super\"

## 2024-01-11 ENCOUNTER — TELEPHONE (OUTPATIENT)
Dept: UROLOGY | Age: 89
End: 2024-01-11

## 2024-01-11 ENCOUNTER — TELEPHONE (OUTPATIENT)
Dept: INTERNAL MEDICINE CLINIC | Age: 89
End: 2024-01-11

## 2024-01-11 DIAGNOSIS — R19.7 DIARRHEA, UNSPECIFIED TYPE: Primary | ICD-10-CM

## 2024-01-11 NOTE — TELEPHONE ENCOUNTER
Danelle from [facility] called saying when nurse went out today, pt is still having quite a bit of diarrhea.  He is incontinent at times and wife comments on what a foul smell there is.  Nurse is asking if Dr. Gay would want to order some stool studies?

## 2024-01-11 NOTE — TELEPHONE ENCOUNTER
Pt states he has had diarrhea 1-2 x per day since day of discharge from his hip fracture.  He took 2 doses of peptobismol yesterday and 1 dose this am.  He states Bm is not watery.  Pt has an anti diarrheal in the Equate brand and it is 2 mg.  He is wondering if he should try that?   Home Health nurse coming to do their initial assessment today.

## 2024-01-11 NOTE — TELEPHONE ENCOUNTER
Voicemail left for Danelle at [facility] with orders for catheter exchanges and irrigations.

## 2024-01-11 NOTE — TELEPHONE ENCOUNTER
I notified pt he could try the antidiarrheal to see if that would clear it up.  He vebalizes understanding.

## 2024-01-11 NOTE — TELEPHONE ENCOUNTER
Danelle from [facility]. Can they exchange the catheter and cancel the lab visit for exchanges?    Last office note states - 20 Fr being exchanged by our office every 10 days. Flushing BID with 60 oz sterile water to keep patent. Doing very well with this routine. Continue.

## 2024-01-11 NOTE — TELEPHONE ENCOUNTER
Yes that is fine.[facility] can do exchanges just how written.    Call if concerns.    20 Fr being exchanged every 10 days. Flushing BID with 60 oz sterile water to keep patent

## 2024-01-15 ENCOUNTER — TELEPHONE (OUTPATIENT)
Dept: INTERNAL MEDICINE CLINIC | Age: 89
End: 2024-01-15

## 2024-01-15 NOTE — TELEPHONE ENCOUNTER
----- Message from Med Gay MD sent at 1/14/2024  9:41 AM EST -----  I advised pt of result.  His diarrhea has resolved.

## 2024-01-16 ENCOUNTER — TELEPHONE (OUTPATIENT)
Dept: INTERNAL MEDICINE CLINIC | Age: 89
End: 2024-01-16

## 2024-01-16 ENCOUNTER — CARE COORDINATION (OUTPATIENT)
Dept: CASE MANAGEMENT | Age: 89
End: 2024-01-16

## 2024-01-16 NOTE — TELEPHONE ENCOUNTER
I notified Kathleen that he should keep his legs up or wear hose if sent home from hospital with them.   Could also use ACE wraps.   She will relay message to pt.

## 2024-01-16 NOTE — CARE COORDINATION
Care Transitions Follow Up Call    Patient Current Location:  Home: [address] [place] [id number]    Care Transition Nurse contacted the spouse/partner by telephone to follow up after admission on 1/3/2024.  Verified name and  with spouse/partner as identifiers.    Patient: Phillip Bates  Patient : 1935   MRN: [id number]  Reason for Admission: s/p Left Hip Hemiarthroplasty    Discharge Date: 24 RARS: Readmission Risk Score: 14.7      Needs to be reviewed by the provider   Additional needs identified to be addressed with provider: No  none             Method of communication with provider: none.    Spoke with wife, Mavis, said Bill is doing pretty good.  Is working with OT right now.  Denies pain, fever, chills, dyspnea, dizziness.  Incision looks good.  Nurse was there earlier and called PCP d/t swelling of his L foot, leg a little red, swollen.  Is waiting for his response.  HH will be changing his chronic Gard[place] when needed.  Kits are to arrive on Thursday.  Eating, drinking, sleeping good.  Bowels are moving.  Will see PCP and surgeon next week.  No other issues to report.  Denies any other needs.  No other questions or concerns at this time.  Will continue to follow.    Addressed changes since last contact:  none  Discussed follow-up appointments. If no appointment was previously scheduled, appointment scheduling offered: Yes.   Is follow up appointment scheduled within 7 days of discharge? No.    Follow Up    Future Appointments         Provider Specialty Dept Phone    2024 1:00 PM Josey Shin, APRN - CNP Urology [phone]    2024 1:15 PM Med Gay MD Internal Medicine [phone]    2024 11:10 AM Terry Armendariz DO Ortho     1/15/2025 1:15 PM Regan Ventura MD Urology [phone]              Care Transition Nurse reviewed medical action plan and red flags with spouse/partner and discussed any barriers to care and/or understanding of plan of care after

## 2024-01-16 NOTE — TELEPHONE ENCOUNTER
Kathleen from [facility] called in saying pt has 2+ pitting edema to his feet.   He has some redness to his lower legs as well but wife comments the redness is a little better today.  Up until yesterday he had been sitting with his legs dangling.   As of yesterday he started elevating his legs in the recliner and wife would put a pillow under legs also.  Pt had a recent hip replacement from a fall.  Pt has an appt with  on 1/23/24

## 2024-01-16 NOTE — TELEPHONE ENCOUNTER
Pts wife called back a little concerned of the reddened area.  It is 3 inches long and right above the ankle.  This is on the operated side.  It is a little warmer than the rest of the leg. And the intensity comes and goes during the course of the day.  He has no pain with this.  Per vo from Dr. Gay/If this worsens through the night he should go to ER.  He should wrap the leg that is swollen but not red only.  Keep legs elevated and come to clinic tomorrow to be checked.  We can presumptively call in an antibiotic of Keflex 500 mg. Every 8 hrs. X 2 days until he can be seen.   Wife does not think she can get him to clinic tomorrow with snow on the ground and he is still on a walker.  She is afraid to chance it.   She will monitor his legs and call in am a report  but did agree to get him to ER if worsens.   She will wait to have antibiotic called in until we see how it goes tomorrow.

## 2024-01-17 RX ORDER — CEPHALEXIN 500 MG/1
500 CAPSULE ORAL 3 TIMES DAILY
Qty: 21 CAPSULE | Refills: 0 | Status: SHIPPED | OUTPATIENT
Start: 2024-01-17 | End: 2024-01-24

## 2024-01-17 NOTE — TELEPHONE ENCOUNTER
Wife called in update.  He did well through the night.  Reddened area on the operated leg which she states is about 6 inches above the ankle looks more faded than last night.  She is not sure if it moved over to the right side but this am she is noticing it more on the right side of the ankle vs the left side of the ankle.  It is warm  to touch but not hot--warmth is similr to the whole leg.  He is in no pain.   They have been monitoring for temp and he has had no temp.  He also has a 1\" square place on the top of his foot that is red and this was not mentioned yesterday but she states she thinks it was there yesterday.    She is worried about how she will get him to his appt with Dr. Gay on 1/23/24.  I explained that she can call the  when they arrive and ask for a wheelchair to be brought down.  She says there is no way she can get him to the office this week.      Do you want him to start on the keflex?  If so how many days?  Legs are still swollen but he has had this all along.  Left leg, operated side, has been a little more swollen than the left since his surgery.  Do you still want him to just wrap the right leg?

## 2024-01-17 NOTE — TELEPHONE ENCOUNTER
Pts wife was notified that we will send in script for keflex x 1 week.   I  also notified her can wrap both legs.     She verbalizes understanding.    He also comments to her today that he feels \"strange\"   He can't tell her anything more than that.    She will monitor and let us know if anything more if he relays anything more definitive.  I also suggested that he monitor  his temp a couple of times a day.

## 2024-01-22 ENCOUNTER — TELEPHONE (OUTPATIENT)
Dept: INTERNAL MEDICINE CLINIC | Age: 89
End: 2024-01-22

## 2024-01-22 NOTE — TELEPHONE ENCOUNTER
Pt called in saying he has no more swelling, no more redness or pink and is walking a lot around the house.  He is wondering if he has to come in tomorrow for his appt.  He would like me to ask Dr. Gay.   I checked with Dr. Gay and he says it is OK if he wants to post pone. They are calling for some freezing rain over night and tomorrow am.   Pt in agreement and we r/s for 1/30/24

## 2024-01-23 ENCOUNTER — TELEPHONE (OUTPATIENT)
Dept: INTERNAL MEDICINE CLINIC | Age: 89
End: 2024-01-23

## 2024-01-23 NOTE — TELEPHONE ENCOUNTER
Wife called in saying pt is not sleeping even with the mirtazapine 30 mg.  He has tried melatonin and no help at all.  He has quit coffee.  He is really getting down.  Has trouble falling asleep.  Sometimes no sleep at night at all.  Wondering if Dr. Gay can change meds at all or offer some kind of help?

## 2024-01-24 ENCOUNTER — CARE COORDINATION (OUTPATIENT)
Dept: CARE COORDINATION | Age: 89
End: 2024-01-24

## 2024-01-24 RX ORDER — TRAZODONE HYDROCHLORIDE 50 MG/1
TABLET ORAL
Qty: 15 TABLET | Refills: 0 | OUTPATIENT
Start: 2024-01-24

## 2024-01-24 NOTE — CARE COORDINATION
Care Transitions Follow Up Call    Patient Current Location:  Home: [address] [place] [id number]    [facility] Care Coordinator contacted the spouse/partner by telephone to follow up after admission on -.  Verified name and  with spouse/partner as identifiers.    Patient: Phillip Bates  Patient : 1935   MRN: [id number]  Reason for Admission: s/p (L) DEJA  Discharge Date: 24 RARS: Readmission Risk Score: 14.7      Needs to be reviewed by the provider   Additional needs identified to be addressed with provider: No  none             Method of communication with provider: none.    LPN CC spoke with patient's spouse, Mavis, HIPAA verified. States patient is doing really well. Denies pain. States there is some swelling. Patient is now using compression stockings & is elevating extremities. Denies use of ice. Ambulating with walker, but transitioning to cane. HHC active. Appetite good. Denies problems with chronic ortega cath. Denies issues with BM. Ortho f/u , PCP . Denies medication changes. Denies needs.  Najma Carreno LPN CC  Care Transitions  [phone]    Addressed changes since last contact:  none  Discussed follow-up appointments. If no appointment was previously scheduled, appointment scheduling offered: Yes.   Is follow up appointment scheduled within 7 days of discharge? Yes.    Follow Up  Future Appointments   Date Time Provider Department Center   2024  1:30 PM Med Gay MD SRPX Physic [email or web address] [place]   1/15/2025  1:15 PM Regan Ventura MD [place] [facility] [[email or web address] [place]     External follow up appointment(s): JOSÉ MIGUEL    LPN Care Coordinator reviewed medical action plan and red flags with spouse/partner and discussed any barriers to care and/or understanding of plan of care after discharge. Discussed appropriate site of care based on symptoms and resources available to patient including: PCP  Specialist  Urgent care clinics  [facility]  When to call 911. The spouse/partner agrees to contact

## 2024-01-29 RX ORDER — TRAZODONE HYDROCHLORIDE 50 MG/1
TABLET ORAL
Qty: 15 TABLET | Refills: 0 | OUTPATIENT
Start: 2024-01-29

## 2024-01-30 ENCOUNTER — OFFICE VISIT (OUTPATIENT)
Dept: INTERNAL MEDICINE CLINIC | Age: 89
End: 2024-01-30
Payer: MEDICARE

## 2024-01-30 VITALS
HEART RATE: 70 BPM | BODY MASS INDEX: 20.59 KG/M2 | WEIGHT: 152 LBS | OXYGEN SATURATION: 95 % | SYSTOLIC BLOOD PRESSURE: 116 MMHG | HEIGHT: 72 IN | DIASTOLIC BLOOD PRESSURE: 60 MMHG | TEMPERATURE: 97.6 F

## 2024-01-30 DIAGNOSIS — S72.002D CLOSED FRACTURE OF LEFT HIP WITH ROUTINE HEALING, SUBSEQUENT ENCOUNTER: ICD-10-CM

## 2024-01-30 DIAGNOSIS — I10 ESSENTIAL HYPERTENSION: Primary | ICD-10-CM

## 2024-01-30 PROCEDURE — G8427 DOCREV CUR MEDS BY ELIG CLIN: HCPCS | Performed by: INTERNAL MEDICINE

## 2024-01-30 PROCEDURE — G8484 FLU IMMUNIZE NO ADMIN: HCPCS | Performed by: INTERNAL MEDICINE

## 2024-01-30 PROCEDURE — 99212 OFFICE O/P EST SF 10 MIN: CPT | Performed by: INTERNAL MEDICINE

## 2024-01-30 PROCEDURE — 1111F DSCHRG MED/CURRENT MED MERGE: CPT | Performed by: INTERNAL MEDICINE

## 2024-01-30 PROCEDURE — 1036F TOBACCO NON-USER: CPT | Performed by: INTERNAL MEDICINE

## 2024-01-30 PROCEDURE — G8420 CALC BMI NORM PARAMETERS: HCPCS | Performed by: INTERNAL MEDICINE

## 2024-01-30 PROCEDURE — 1123F ACP DISCUSS/DSCN MKR DOCD: CPT | Performed by: INTERNAL MEDICINE

## 2024-01-30 ASSESSMENT — PATIENT HEALTH QUESTIONNAIRE - PHQ9
1. LITTLE INTEREST OR PLEASURE IN DOING THINGS: 0
SUM OF ALL RESPONSES TO PHQ QUESTIONS 1-9: 0
SUM OF ALL RESPONSES TO PHQ QUESTIONS 1-9: 0
2. FEELING DOWN, DEPRESSED OR HOPELESS: 0
SUM OF ALL RESPONSES TO PHQ QUESTIONS 1-9: 0
SUM OF ALL RESPONSES TO PHQ QUESTIONS 1-9: 0
SUM OF ALL RESPONSES TO PHQ9 QUESTIONS 1 & 2: 0

## 2024-01-30 NOTE — PROGRESS NOTES
Phillip Bates (:  1935) is a 88 y.o. male,Established patient, here for evaluation of the following chief complaint(s):  Follow-Up from Hospital (D/c 24--total hip replacement d/t fx from a fall--having some swelling-more on the left which is the operated hip) and Insomnia (Med changed to trazadone.  Makes him fall asleep quicker but after a couple of hours he wakes up.  He does not want anything stronger d/t forgetfulness)         ASSESSMENT/PLAN:  1. Essential hypertension- controlled- continue meds  2. Closed fracture of left hip with routine healing, subsequent encounter- using cane now; has seen ortho; has home PT.  Doing well  3. Sleep issues; on trazodone; advised try warm milk    Return in about 3 months (around 2024).         Subjective   SUBJECTIVE/OBJECTIVE:  HPI pt with hx hbp, prostate ca, chronic ortega; recent hip fx due to fall.  Healing ok.  Has sleep issues, wife indicates chronic.  Otherwise stable.  No bowel issues.      Review of Systems   Twelve point ROS completed and found to be negative except as noted above.      Objective   Physical Exam   /60 (Site: Right Upper Arm)   Pulse 70   Temp 97.6 °F (36.4 °C)   Ht 1.829 m (6' 0.01\")   Wt 68.9 kg (152 lb)   SpO2 95% Comment: at rest on room air  BMI 20.61 kg/m²     General appearance - alert, well appearing, and in no distress    Mental Status - alert, oriented to person, place, and time          Neck - supple, no significant adenopathy        Chest - clear to auscultation, no wheezes, rales or rhonchi, symmetric air entry     Heart - normal rate, regular rhythm, normal S1, S2, no murmurs, rubs, clicks or gallops     Abdomen - soft, nontender, nondistended, no masses or organomegaly         Neurological - alert, oriented, normal speech, no focal findings or movement disorder noted     Musculoskeletal -   msk exam:hip examined, suture line healing well    Extremities - pedal edema 1+ on left ( chronic)    Skin -

## 2024-01-31 ENCOUNTER — CARE COORDINATION (OUTPATIENT)
Dept: CASE MANAGEMENT | Age: 89
End: 2024-01-31

## 2024-01-31 NOTE — CARE COORDINATION
[facility]  Home health  When to call 911. The spouse/partner agrees to contact the PCP office for questions related to their healthcare.     Advance Care Planning:   reviewed and current.     Patients top risk factors for readmission: lack of knowledge about disease and medical condition-hip surgery, HTN  Interventions to address risk factors: Scheduled appointment with PCP-5/14    Offered patient enrollment in the Remote Patient Monitoring (RPM) program for in-home monitoring: Patient declined.     Care Transitions Subsequent and Final Call    Subsequent and Final Calls  Do you have any ongoing symptoms?: No  Have your medications changed?: Yes  Patient Reports: Trazadone  Do you have any questions related to your medications?: No  Do you currently have any active services?: Yes  Are you currently active with any services?: Home Health  Do you have any needs or concerns that I can assist you with?: No  Identified Barriers: Lack of Education  Care Transitions Interventions     Transportation Support: Declined   Other Interventions:             Care Transition Nurse provided contact information for future needs. No further follow-up call indicated based on severity of symptoms and risk factors.    Martha Verma RN

## 2024-02-01 ENCOUNTER — TELEPHONE (OUTPATIENT)
Dept: INTERNAL MEDICINE CLINIC | Age: 89
End: 2024-02-01

## 2024-02-01 NOTE — TELEPHONE ENCOUNTER
Pt hs tried the warm milk  and melatonin with no help.  Nurse asking about zquil or tylenol pm or maybe something like lunesta?

## 2024-02-01 NOTE — TELEPHONE ENCOUNTER
Kyra from [facility] called saying pt has been having a lot of trouble with insomnia.   He had tried mirtzapine and last week you changed to Trazodone 50 mg. 0.5 to 1 tablet at night and this is not helping either.  Looking for other suggestions even OTC meds?

## 2024-02-19 ENCOUNTER — TELEPHONE (OUTPATIENT)
Dept: UROLOGY | Age: 89
End: 2024-02-19

## 2024-04-17 ENCOUNTER — TELEPHONE (OUTPATIENT)
Dept: UROLOGY | Age: 89
End: 2024-04-17

## 2024-04-17 NOTE — TELEPHONE ENCOUNTER
Janeen from [facility] [phone] would like to know if the catheter exchanges can be every 3 weeks.     He currently is doing every 14 days. He is not having any problems with occlusion. 10 ml balloon 20 FR    Fax [phone]    Next appointment 01/15/2025

## 2024-05-21 ENCOUNTER — NURSE ONLY (OUTPATIENT)
Dept: UROLOGY | Age: 89
End: 2024-05-21
Payer: MEDICARE

## 2024-05-21 ENCOUNTER — TELEPHONE (OUTPATIENT)
Dept: UROLOGY | Age: 89
End: 2024-05-21

## 2024-05-21 DIAGNOSIS — N31.9 NEUROGENIC BLADDER: Primary | ICD-10-CM

## 2024-05-21 PROCEDURE — 51702 INSERT TEMP BLADDER CATH: CPT

## 2024-05-21 NOTE — PROGRESS NOTES
Patient has given me verbal consent to perform catheter change Yes    DIAGNOSIS/INDICATION:  Neurogenic bladder    Does patient have latex allergy?No  Does patient have shellfish or betadine allergy? No      Following [facility] plan of care. 20Fr Catheter changed without difficulty.    Once balloon was deflated, removed catheter without difficulty. Cleansed urethral opening with betadine swab. 20Fr regular ortega was inserted using sterile water-soluble lubricant without difficulty. Catheter was flushed with 150cc water ensuring return and inflated balloon with 10 ml of water. Ortega Catheter was hooked up to leg bag.    Foreskin reduced back down? N/A    Patient instructed on how to drain catheter bags.    If patient was given a leg bag, patient was instructed to keep bag above the knee to prevent pulling on catheter. Pt notified if catheter is pulled on may cause pain and bleeding.     Patient was also instructed on how to switch from leg bag to overnight bag.          Supplies were provided by office

## 2024-05-21 NOTE — PROGRESS NOTES
Catheter changed secondary to occlusion. Home health typically performs exchanges. However, the patient was not able to contact .     Continue with exchanges every 3 weeks with a 20 F catheter, 10 cc in balloon, with [facility].    The patient should go to the ED should they develop fever, chills, nausea, vomiting, chest pain, SOB, calf pain, feelings of incomplete emptying, or should they otherwise feel they need evaluated    Patient to f/u as scheduled with Dr. Ventura

## 2024-05-21 NOTE — TELEPHONE ENCOUNTER
Patient c/o catheter is occluded and having a hard time getting a hold of home health. Lab visit scheduled to check for occlusion.

## 2024-06-07 ENCOUNTER — TELEPHONE (OUTPATIENT)
Dept: UROLOGY | Age: 89
End: 2024-06-07

## 2024-06-07 NOTE — TELEPHONE ENCOUNTER
Home health called and said patient wants to try to go 4 weeks between cath changes. His last one was 6-5-24. Please advise. Contact Janeen at [phone]. If ok with that we will need to fax order to [phone].

## 2024-06-18 DIAGNOSIS — I10 PRIMARY HYPERTENSION: ICD-10-CM

## 2024-07-08 DIAGNOSIS — I10 PRIMARY HYPERTENSION: ICD-10-CM

## 2024-07-08 RX ORDER — LISINOPRIL 10 MG/1
10 TABLET ORAL 2 TIMES DAILY
Qty: 180 TABLET | Refills: 3 | Status: SHIPPED | OUTPATIENT
Start: 2024-07-08

## 2024-07-08 RX ORDER — AMLODIPINE BESYLATE 10 MG/1
10 TABLET ORAL DAILY
Qty: 90 TABLET | Refills: 3 | Status: SHIPPED | OUTPATIENT
Start: 2024-07-08

## 2024-08-26 DIAGNOSIS — N31.9 NEUROGENIC BLADDER: Primary | ICD-10-CM

## 2024-08-26 RX ORDER — MAGNESIUM HYDROXIDE 1200 MG/15ML
LIQUID ORAL
Qty: 5000 ML | Refills: 3 | Status: SHIPPED | OUTPATIENT
Start: 2024-08-26

## 2024-08-26 NOTE — TELEPHONE ENCOUNTER
Phillip Bates called requesting a refill on the following medications:  Requested Prescriptions     Pending Prescriptions Disp Refills    sod chloride IRR soln 0.9 % irrigation 3000 mL 2     Sig: Irrigate with as directed for 1 dose.     Pharmacy verified:    [facility] [address] [place] [address] [address] - P [phone] - F [phone]     Date of last visit: 05/21/2024  Date of next visit (if applicable): 01/15/2025    Pt states that this doesn't cover the amount of sterile water he will need for a month of twice a day flushes. Please advise if able to get extra water, feels he needs about 25% more water.

## 2024-08-27 NOTE — TELEPHONE ENCOUNTER
Patient is calling in regarding this, the wrong thing was sent in.  He needs the sterile water for irrigation for his ortega caths sent to [facility], not the sodium chloride solution.  Please call him to confirm.

## 2024-10-04 NOTE — TELEPHONE ENCOUNTER
Phillip Bates called requesting a refill on the following medications:  Requested Prescriptions     Pending Prescriptions Disp Refills    Water For Irrigation, Sterile (STERILE WATER FOR IRRIGATION) [Pharmacy Med Name: Sterile Water for Irrigation Irrigation Solution]  0     Sig: IRRIGATE ABURTO CATHETER WITH 60-100MLS OF STERILE WATER AS NEEDED TO MAINTAIN PATHWAY     Pharmacy verified:  .pv      Date of last visit: 01/02/2024  Date of next visit (if applicable): 1/15/2025

## 2024-11-11 NOTE — TELEPHONE ENCOUNTER
Phillip Bates called requesting a refill on the following medications:  Requested Prescriptions     Pending Prescriptions Disp Refills    Water For Irrigation, Sterile (STERILE WATER FOR IRRIGATION) 3000 mL 0     Sig: IRRIGATE ABURTO CATHETER WITH 60-100MLS OF STERILE WATER AS NEEDED TO MAINTAIN PATHWAY     Pharmacy verified:  .pv      Date of last visit:   Date of next visit (if applicable): 2/5/2025

## 2024-11-12 NOTE — TELEPHONE ENCOUNTER
3000 mL sent with 3 refills    Please have the patient follow-up as scheduled or sooner if needed    The patient should go to the ED should they develop fever, chills, nausea, vomiting, chest pain, SOB, calf pain, feelings of incomplete emptying, or should they otherwise feel they need evaluated]

## 2024-11-12 NOTE — TELEPHONE ENCOUNTER
Sent to [facility]    Please have the patient follow-up as scheduled or sooner if needed    The patient should go to the ED should they develop fever, chills, nausea, vomiting, chest pain, SOB, calf pain, feelings of incomplete emptying, or should they otherwise feel they need evaluated

## 2024-12-05 ENCOUNTER — TELEPHONE (OUTPATIENT)
Dept: INTERNAL MEDICINE CLINIC | Age: 88
End: 2024-12-05

## 2024-12-05 NOTE — TELEPHONE ENCOUNTER
Per vo from Dr Gay/It won't hurt but he feels it unlikely to be effective.    Pt was notified and he said that is kind of what he thought.

## 2025-01-21 NOTE — PROGRESS NOTES
Pharmacy Note  ED Culture Follow-up    Nati Varghese is a 80 y.o. male. Allergies: Patient has no known allergies. Labs:  Lab Results   Component Value Date    BUN 13 10/14/2021    CREATININE 1.0 10/14/2021    WBC 9.3 10/14/2021     CrCl cannot be calculated (Patient's most recent lab result is older than the maximum 10 days allowed. ). Current antimicrobials:   Levaquin 500 mg BID x 7 days    ASSESSMENT:  Micro results:   Urine culture: positive for Proteus mirabilis, Pseudomonas aeruginosa     PLAN:  Need for intervention: No 2/2 PCP added amoxicillin 500 mg TID  Discussed with:  RANDALL Warner  Chosen treatment:    Patient already on appropriate treatment as above    Patient response:   No need to contact patient    Called/sent in prescription to: Not applicable    Please call with any questions.  [address] [place]    Baby Macho, [facility] [place], PharmD 4:24 PM 2/23/2022 [Time Spent: ___ minutes] : I have spent [unfilled] minutes of time on the encounter which excludes teaching and separately reported services.

## 2025-02-05 ENCOUNTER — OFFICE VISIT (OUTPATIENT)
Dept: UROLOGY | Age: 89
End: 2025-02-05
Payer: MEDICARE

## 2025-02-05 VITALS — RESPIRATION RATE: 22 BRPM | HEIGHT: 72 IN | BODY MASS INDEX: 20.59 KG/M2 | WEIGHT: 152 LBS

## 2025-02-05 DIAGNOSIS — R33.9 RETENTION OF URINE: ICD-10-CM

## 2025-02-05 DIAGNOSIS — N39.0 RECURRENT UTI: ICD-10-CM

## 2025-02-05 DIAGNOSIS — Z85.46 HISTORY OF PROSTATE CANCER: ICD-10-CM

## 2025-02-05 DIAGNOSIS — R33.9 URINARY RETENTION: ICD-10-CM

## 2025-02-05 DIAGNOSIS — N31.9 NEUROGENIC BLADDER: Primary | ICD-10-CM

## 2025-02-05 PROCEDURE — 1036F TOBACCO NON-USER: CPT | Performed by: UROLOGY

## 2025-02-05 PROCEDURE — 1159F MED LIST DOCD IN RCRD: CPT | Performed by: UROLOGY

## 2025-02-05 PROCEDURE — G8427 DOCREV CUR MEDS BY ELIG CLIN: HCPCS | Performed by: UROLOGY

## 2025-02-05 PROCEDURE — 1123F ACP DISCUSS/DSCN MKR DOCD: CPT | Performed by: UROLOGY

## 2025-02-05 PROCEDURE — 99214 OFFICE O/P EST MOD 30 MIN: CPT | Performed by: UROLOGY

## 2025-02-05 PROCEDURE — G8420 CALC BMI NORM PARAMETERS: HCPCS | Performed by: UROLOGY

## 2025-02-05 NOTE — PROGRESS NOTES
Dr. Regan Ventura MD  [facility]  Urology Clinic      Patient:  Phillip Bates  YOB: 1935  Date: 2/5/2025    HISTORY OF PRESENT ILLNESS:   The patient is a 89 y.o. male who presents today for follow-up for the following problem(s): Urinary retention w ortega. Ortega is getting clogged every 12 days or so. His ortega is changed every 21 days. He is also flushing his ortega BID. We also increase ortega to 20F.     Overall the problem(s) : are worsening.  Associated Symptoms: No dysuria, gross hematuria.  Pain Severity: 0/10      Imaging/Labs reviewed during today's visit:  I have independently reviewed and verified the following films during today's visit.  None    Last several PSA's:  Lab Results   Component Value Date    PSA <0.02 10/13/2022    PSA <0.02 05/08/2015       Last total testosterone:  No results found for: \"TESTOSTERONE\"    Urinalysis today:  No results found for this visit on 02/05/25.    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

## 2025-03-04 ENCOUNTER — TELEPHONE (OUTPATIENT)
Dept: INTERNAL MEDICINE CLINIC | Age: 89
End: 2025-03-04

## 2025-03-04 NOTE — TELEPHONE ENCOUNTER
Kathleen from [facility] called an update.  Pt reports a fall 2 days ago.  He fell on his rt hip and hit his head.   He did not go to ER or seek medical treatment.   He says he did not see the need to go.  She advised him any fall where he hits his head should get checked out.   Patient has 1 week of Tramadol left, home care nurse calling to request med be refilled today as pt will be going to pharmacy today and nurse can set up her next meds.  CAROL Flores R.N.      Tramadol        Last Written Prescription Date:  4/1/18 by PCP, used while in TCU also   Last Fill Quantity: 100   # refills: 0  Last Office Visit: 3/29/18  Future Office visit:    Next 5 appointments (look out 90 days)     Jun 21, 2018  1:00 PM CDT   SHORT with Jenny Hamilton MD   [facility]    [address]  [facility] [address]   [phone]                   Routing refill request to provider for review/approval because:  Drug not on the FMG, UMP or  Health refill protocol or controlled substance  RX monitoring program [place]) reviewed:  reviewed- no concerns    [place]:  [email or web address]

## 2025-03-05 NOTE — TELEPHONE ENCOUNTER
I spoke with pt.  He says he feels fine.  He fell over backward and landed mostly on his hip and bumped head on floor.   So far he has had no residual effects.  He thanks Dr. Gay for his concern but he would like to pass on the CT Scan.   I advised pt if has any visual change, lightheadedness or dizziness. Faint feeling he should report to ER.   He verbalizes understanding.

## 2025-03-11 NOTE — TELEPHONE ENCOUNTER
Addended by: MELISSA BRENNAN on: 3/11/2025 10:23 AM     Modules accepted: Orders     Patient was seen in the office yesterday. He wanted to clarify if he needs to continue the flomax since a ortega was placed. Please advise. Thank you.

## 2025-04-08 NOTE — TELEPHONE ENCOUNTER
Phillip Bates called requesting a refill on the following medications:  Requested Prescriptions     Pending Prescriptions Disp Refills    Water For Irrigation, Sterile (STERILE WATER FOR IRRIGATION) [Pharmacy Med Name: Sterile Water for Irrigation Irrigation Solution]  0     Sig: IRRIGATE ABURTO CATHETER WITH 60 TO 100ML OF STERILE WATER AS NEEDED TO MAINTAIN PATHWAY     Pharmacy verified:  .pv      Date of last visit: 02/05/2025  Date of next visit (if applicable): 02/04/2026

## 2025-04-09 ENCOUNTER — TELEPHONE (OUTPATIENT)
Dept: INTERNAL MEDICINE CLINIC | Age: 89
End: 2025-04-09

## 2025-04-09 NOTE — TELEPHONE ENCOUNTER
Janeen from [facility] called stating patient is having signs and symptoms of a UTI and she is requesting an order for a U/A. OK to order?

## 2025-04-09 NOTE — TELEPHONE ENCOUNTER
Sent    The patient should go to the ED should they develop fever, chills, nausea, vomiting, significant gross hematuria, chest pain, SOB, calf pain, feelings of incomplete emptying, or should they otherwise feel they need evaluated.    Please have the patient follow-up as scheduled or sooner if needed

## 2025-04-10 ENCOUNTER — TELEPHONE (OUTPATIENT)
Dept: INTERNAL MEDICINE CLINIC | Age: 89
End: 2025-04-10

## 2025-04-10 ENCOUNTER — HOSPITAL ENCOUNTER (EMERGENCY)
Age: 89
Discharge: HOME | End: 2025-04-10
Attending: EMERGENCY MEDICINE
Payer: MEDICARE

## 2025-04-10 ENCOUNTER — APPOINTMENT (OUTPATIENT)
Dept: CT IMAGING | Age: 89
End: 2025-04-10
Payer: MEDICARE

## 2025-04-10 ENCOUNTER — APPOINTMENT (OUTPATIENT)
Dept: ULTRASOUND IMAGING | Age: 89
End: 2025-04-10
Payer: MEDICARE

## 2025-04-10 ENCOUNTER — RESULTS FOLLOW-UP (OUTPATIENT)
Dept: INTERNAL MEDICINE CLINIC | Age: 89
End: 2025-04-10

## 2025-04-10 VITALS
HEIGHT: 72 IN | DIASTOLIC BLOOD PRESSURE: 86 MMHG | BODY MASS INDEX: 20.32 KG/M2 | SYSTOLIC BLOOD PRESSURE: 163 MMHG | HEART RATE: 72 BPM | RESPIRATION RATE: 16 BRPM | OXYGEN SATURATION: 96 % | WEIGHT: 150 LBS | TEMPERATURE: 97.4 F

## 2025-04-10 DIAGNOSIS — R31.9 HEMATURIA, UNSPECIFIED TYPE: ICD-10-CM

## 2025-04-10 DIAGNOSIS — N39.0 URINARY TRACT INFECTION ASSOCIATED WITH INDWELLING URETHRAL CATHETER, INITIAL ENCOUNTER: ICD-10-CM

## 2025-04-10 DIAGNOSIS — T83.511A URINARY TRACT INFECTION ASSOCIATED WITH INDWELLING URETHRAL CATHETER, INITIAL ENCOUNTER: ICD-10-CM

## 2025-04-10 DIAGNOSIS — N13.30 BILATERAL HYDRONEPHROSIS: Primary | ICD-10-CM

## 2025-04-10 DIAGNOSIS — K40.20 BILATERAL INGUINAL HERNIA WITHOUT OBSTRUCTION OR GANGRENE, RECURRENCE NOT SPECIFIED: ICD-10-CM

## 2025-04-10 LAB
AMORPH SED URNS QL MICRO: ABNORMAL
ANION GAP SERPL CALC-SCNC: 13 MEQ/L (ref 8–16)
BACTERIA URNS QL MICRO: ABNORMAL /HPF
BASOPHILS ABSOLUTE: 0 THOU/MM3 (ref 0–0.1)
BASOPHILS NFR BLD AUTO: 0.4 %
BILIRUB UR QL STRIP.AUTO: NEGATIVE
BUN SERPL-MCNC: 23 MG/DL (ref 8–23)
CALCIUM SERPL-MCNC: 9.8 MG/DL (ref 8.8–10.2)
CASTS #/AREA URNS LPF: ABNORMAL /LPF
CHARACTER UR: ABNORMAL
CHLORIDE SERPL-SCNC: 97 MEQ/L (ref 98–111)
CO2 SERPL-SCNC: 27 MEQ/L (ref 22–29)
COLOR, UA: ABNORMAL
CREAT SERPL-MCNC: 1 MG/DL (ref 0.7–1.2)
CRYSTALS URNS MICRO: ABNORMAL
DEPRECATED RDW RBC AUTO: 47.6 FL (ref 35–45)
EOSINOPHIL NFR BLD AUTO: 1 %
EOSINOPHILS ABSOLUTE: 0.1 THOU/MM3 (ref 0–0.4)
EPITHELIAL CELLS, UA: ABNORMAL /HPF
ERYTHROCYTE [DISTWIDTH] IN BLOOD BY AUTOMATED COUNT: 13.2 % (ref 11.5–14.5)
GFR SERPL CREATININE-BSD FRML MDRD: 72 ML/MIN/1.73M2
GLUCOSE SERPL-MCNC: 117 MG/DL (ref 74–109)
GLUCOSE UR QL STRIP.AUTO: NEGATIVE MG/DL
HCT VFR BLD AUTO: 45.4 % (ref 42–52)
HGB BLD-MCNC: 14.9 GM/DL (ref 14–18)
HGB UR QL STRIP.AUTO: ABNORMAL
IMM GRANULOCYTES # BLD AUTO: 0.04 THOU/MM3 (ref 0–0.07)
IMM GRANULOCYTES NFR BLD AUTO: 0.4 %
KETONES UR QL STRIP.AUTO: NEGATIVE
LYMPHOCYTES ABSOLUTE: 1.7 THOU/MM3 (ref 1–4.8)
LYMPHOCYTES NFR BLD AUTO: 17.7 %
MCH RBC QN AUTO: 31.9 PG (ref 26–33)
MCHC RBC AUTO-ENTMCNC: 32.8 GM/DL (ref 32.2–35.5)
MCV RBC AUTO: 97.2 FL (ref 80–94)
MONOCYTES ABSOLUTE: 1.2 THOU/MM3 (ref 0.4–1.3)
MONOCYTES NFR BLD AUTO: 12 %
NEUTROPHILS ABSOLUTE: 6.7 THOU/MM3 (ref 1.8–7.7)
NEUTROPHILS NFR BLD AUTO: 68.5 %
NITRITE UR QL STRIP: NEGATIVE
NRBC BLD AUTO-RTO: 0 /100 WBC
OSMOLALITY SERPL CALC.SUM OF ELEC: 278.5 MOSMOL/KG (ref 275–300)
PH UR STRIP.AUTO: 7.5 [PH] (ref 5–9)
PLATELET # BLD AUTO: 392 THOU/MM3 (ref 130–400)
PMV BLD AUTO: 10 FL (ref 9.4–12.4)
POTASSIUM SERPL-SCNC: 3.9 MEQ/L (ref 3.5–5.2)
PROT UR STRIP.AUTO-MCNC: 100 MG/DL
RBC # BLD AUTO: 4.67 MILL/MM3 (ref 4.7–6.1)
RBC URINE: ABNORMAL /HPF
SODIUM SERPL-SCNC: 137 MEQ/L (ref 135–145)
SP GR UR REFRACT.AUTO: < 1.005 (ref 1–1.03)
UROBILINOGEN, URINE: 0.2 EU/DL (ref 0–1)
WBC # BLD AUTO: 9.8 THOU/MM3 (ref 4.8–10.8)
WBC #/AREA URNS HPF: ABNORMAL /HPF
WBC #/AREA URNS HPF: ABNORMAL /[HPF]

## 2025-04-10 PROCEDURE — 85025 COMPLETE CBC W/AUTO DIFF WBC: CPT

## 2025-04-10 PROCEDURE — 2500000003 HC RX 250 WO HCPCS: Performed by: NURSE PRACTITIONER

## 2025-04-10 PROCEDURE — 99285 EMERGENCY DEPT VISIT HI MDM: CPT

## 2025-04-10 PROCEDURE — 81001 URINALYSIS AUTO W/SCOPE: CPT

## 2025-04-10 PROCEDURE — 76870 US EXAM SCROTUM: CPT

## 2025-04-10 PROCEDURE — 2580000003 HC RX 258: Performed by: NURSE PRACTITIONER

## 2025-04-10 PROCEDURE — 80048 BASIC METABOLIC PNL TOTAL CA: CPT

## 2025-04-10 PROCEDURE — 87086 URINE CULTURE/COLONY COUNT: CPT

## 2025-04-10 PROCEDURE — 6360000002 HC RX W HCPCS: Performed by: NURSE PRACTITIONER

## 2025-04-10 PROCEDURE — 6360000004 HC RX CONTRAST MEDICATION: Performed by: NURSE PRACTITIONER

## 2025-04-10 PROCEDURE — 36415 COLL VENOUS BLD VENIPUNCTURE: CPT

## 2025-04-10 PROCEDURE — 96374 THER/PROPH/DIAG INJ IV PUSH: CPT

## 2025-04-10 PROCEDURE — 96361 HYDRATE IV INFUSION ADD-ON: CPT

## 2025-04-10 PROCEDURE — 74177 CT ABD & PELVIS W/CONTRAST: CPT

## 2025-04-10 RX ORDER — 0.9 % SODIUM CHLORIDE 0.9 %
1000 INTRAVENOUS SOLUTION INTRAVENOUS ONCE
Status: COMPLETED | OUTPATIENT
Start: 2025-04-10 | End: 2025-04-10

## 2025-04-10 RX ORDER — CEFDINIR 300 MG/1
300 CAPSULE ORAL 2 TIMES DAILY
Qty: 20 CAPSULE | Refills: 0 | Status: SHIPPED | OUTPATIENT
Start: 2025-04-10 | End: 2025-04-20

## 2025-04-10 RX ORDER — IOPAMIDOL 755 MG/ML
80 INJECTION, SOLUTION INTRAVASCULAR
Status: COMPLETED | OUTPATIENT
Start: 2025-04-10 | End: 2025-04-10

## 2025-04-10 RX ADMIN — WATER 1000 MG: 1 INJECTION INTRAMUSCULAR; INTRAVENOUS; SUBCUTANEOUS at 21:53

## 2025-04-10 RX ADMIN — SODIUM CHLORIDE 1000 ML: 9 INJECTION, SOLUTION INTRAVENOUS at 19:30

## 2025-04-10 RX ADMIN — IOPAMIDOL 80 ML: 755 INJECTION, SOLUTION INTRAVENOUS at 21:07

## 2025-04-10 ASSESSMENT — PAIN DESCRIPTION - ORIENTATION: ORIENTATION: RIGHT

## 2025-04-10 ASSESSMENT — PAIN SCALES - GENERAL: PAINLEVEL_OUTOF10: 1

## 2025-04-10 ASSESSMENT — PAIN DESCRIPTION - LOCATION: LOCATION: FLANK

## 2025-04-10 ASSESSMENT — PAIN - FUNCTIONAL ASSESSMENT
PAIN_FUNCTIONAL_ASSESSMENT: 0-10
PAIN_FUNCTIONAL_ASSESSMENT: NONE - DENIES PAIN

## 2025-04-10 NOTE — TELEPHONE ENCOUNTER
Nurse from [facility] called in saying she is at pts home to get the urine sample and his scrotum is very swollen.  Pt says it has happened in the past  and comes and goes but has it daily.  Nurse says it was not swollen when she did his catheter change yesterday.  They are looking for suggestions to get the swelling down

## 2025-04-10 NOTE — ED NOTES
Urine sample collected and sent to lab. Patient resting in bed. Respirations easy and unlabored. No distress noted. Call light within reach.

## 2025-04-10 NOTE — TELEPHONE ENCOUNTER
Per vo from Dr Gay/Pt should wear a support and keep the scrotum elevated when lying down.  They should contact his urologist tomorrow.     I advised her Dr. Gay asked if red and painful and they may need to know that info.   I left a message for nurse on her voicemail.

## 2025-04-10 NOTE — ED TRIAGE NOTES
Pt presents to the ED for blood in his urine that started 30mins prior to arrival. Pt states his home health nurse sent in a urine sample earlier today due to thinking the pt had an infection. Pt complaining of a \"twinge\" of pain on the right flank.

## 2025-04-11 ENCOUNTER — TELEPHONE (OUTPATIENT)
Dept: UROLOGY | Age: 89
End: 2025-04-11

## 2025-04-11 ENCOUNTER — OFFICE VISIT (OUTPATIENT)
Dept: UROLOGY | Age: 89
End: 2025-04-11
Payer: MEDICARE

## 2025-04-11 VITALS — RESPIRATION RATE: 20 BRPM | HEIGHT: 72 IN | BODY MASS INDEX: 20.32 KG/M2 | WEIGHT: 150 LBS

## 2025-04-11 DIAGNOSIS — R33.9 URINARY RETENTION: ICD-10-CM

## 2025-04-11 DIAGNOSIS — R31.0 GROSS HEMATURIA: Primary | ICD-10-CM

## 2025-04-11 DIAGNOSIS — Z85.46 HISTORY OF PROSTATE CANCER: ICD-10-CM

## 2025-04-11 DIAGNOSIS — N39.0 RECURRENT UTI: ICD-10-CM

## 2025-04-11 DIAGNOSIS — N31.9 NEUROGENIC BLADDER: ICD-10-CM

## 2025-04-11 DIAGNOSIS — N13.30 BILATERAL HYDRONEPHROSIS: ICD-10-CM

## 2025-04-11 PROCEDURE — 99214 OFFICE O/P EST MOD 30 MIN: CPT

## 2025-04-11 PROCEDURE — 1123F ACP DISCUSS/DSCN MKR DOCD: CPT

## 2025-04-11 PROCEDURE — G8427 DOCREV CUR MEDS BY ELIG CLIN: HCPCS

## 2025-04-11 PROCEDURE — 1036F TOBACCO NON-USER: CPT

## 2025-04-11 PROCEDURE — G8420 CALC BMI NORM PARAMETERS: HCPCS

## 2025-04-11 PROCEDURE — 1159F MED LIST DOCD IN RCRD: CPT

## 2025-04-11 NOTE — PROGRESS NOTES
[facility] [place] [facility]  [facility]  [address]  [address]  [facility] [address]  Dept: [phone]  Loc: [phone]  Visit Date: 4/11/2025      HPI  Phillip Bates is a 89 y.o. male that presents to the urology clinic for ED UTI and hydronephrosis follow-up.    Patient with chronic indwelling ortega catheter due to his neurogenic bladder. Ortega is exchanged every 10 days or so to avoid becoming obstructed with sediment. Also flushing BID with 60 oz of sterile water. Previously doing well with his current routine up until his recent ED visit to [facility] on 04/10/25 for hematuria and flank pain where he was found to have moderate to severe bilateral hydronephrosis and a UTI for which he received CTX 1g IV and ortega exchange. Sent home on Cefdinir.     Urine remains light sergei this AM. Flank pain is very mild per patient account.    Dementia patient.      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, unspecified stricture type      Past Surgical History:   Procedure Laterality Date    COLONOSCOPY      CYSTOSCOPY Left 10/14/2021    CYSTOSCOPY EVACUATION OF CLOTS, EVACUATION OF BLADDER STONE, CHANNEL TURP  performed by Walter Rubio MD at [facility] OR    HEMORRHOID SURGERY  1970    HERNIA REPAIR      HIP SURGERY Left 1/6/2024    Left Hip Hemiarthroplasty performed by Terry Armendariz DO at [facility] OR    UT COLONOSCOPY FLX DX W/COLLJ SPEC WHEN PFRMD Left 6/11/2018    COLONOSCOPY performed by Mike Ford MD at [facility] Endoscopy    PROSTATE SURGERY  2005    seed implant    TONSILLECTOMY       Family History   Problem Relation Age of

## 2025-04-11 NOTE — TELEPHONE ENCOUNTER
Patient scheduled for mri prostate w wo  at [facility] ope on 5/8/2025.  Arrival of 1030 for a 1100 scan time.  Order with instructions given to the patient in the office; npo 4 hours prior

## 2025-04-11 NOTE — ED PROVIDER NOTES
4/10/25 2107)   cefTRIAXone (ROCEPHIN) 1,000 mg in sterile water 10 mL IV syringe (1,000 mg IntraVENous Given 4/10/25 2153)         CONSULTS:  None    PROCEDURES: (None if blank)  Procedures:     CRITICAL CARE: (None if blank)      DISCHARGE PRESCRIPTIONS: (None if blank)  Discharge Medication List as of 4/10/2025 11:07 PM        START taking these medications    Details   cefdinir (OMNICEF) 300 MG capsule Take 1 capsule by mouth 2 times daily for 10 days, Disp-20 capsule, R-0Normal             FINAL IMPRESSION      1. Bilateral hydronephrosis    2. Bilateral inguinal hernia without obstruction or gangrene, recurrence not specified    3. Urinary tract infection associated with indwelling urethral catheter, initial encounter    4. Hematuria, unspecified type          DISPOSITION/PLAN   DISPOSITION Decision To Discharge 04/10/2025 10:59:03 PM   DISPOSITION CONDITION Stable           OUTPATIENT FOLLOW UP THE PATIENT:  [facility]  [address] [address]  [phone]  Go in 1 day  @ 10:30 am      MARIKA Marina CNP, Kristy J, APRN - CNP  04/11/25 0643

## 2025-04-11 NOTE — DISCHARGE INSTRUCTIONS
Drink plenty of water.  Make sure you are taking your medication as directed.  Finish antibiotics.  Go to urology tomorrow as scheduled

## 2025-04-12 LAB — BACTERIA UR CULT: NORMAL

## 2025-05-08 ENCOUNTER — RESULTS FOLLOW-UP (OUTPATIENT)
Dept: UROLOGY | Age: 89
End: 2025-05-08

## 2025-05-08 ENCOUNTER — HOSPITAL ENCOUNTER (OUTPATIENT)
Dept: CT IMAGING | Age: 89
Discharge: HOME | End: 2025-05-08
Payer: MEDICARE

## 2025-05-08 DIAGNOSIS — N13.30 BILATERAL HYDRONEPHROSIS: ICD-10-CM

## 2025-05-08 DIAGNOSIS — R31.0 GROSS HEMATURIA: ICD-10-CM

## 2025-05-08 PROCEDURE — 6360000004 HC RX CONTRAST MEDICATION

## 2025-05-08 PROCEDURE — 74178 CT ABD&PLV WO CNTR FLWD CNTR: CPT

## 2025-05-08 RX ORDER — IOPAMIDOL 755 MG/ML
85 INJECTION, SOLUTION INTRAVASCULAR
Status: COMPLETED | OUTPATIENT
Start: 2025-05-08 | End: 2025-05-08

## 2025-05-08 RX ADMIN — IOPAMIDOL 85 ML: 755 INJECTION, SOLUTION INTRAVENOUS at 11:17

## 2025-05-13 ENCOUNTER — OFFICE VISIT (OUTPATIENT)
Dept: UROLOGY | Age: 89
End: 2025-05-13
Payer: MEDICARE

## 2025-05-13 ENCOUNTER — OFFICE VISIT (OUTPATIENT)
Dept: INTERNAL MEDICINE CLINIC | Age: 89
End: 2025-05-13
Payer: MEDICARE

## 2025-05-13 VITALS — WEIGHT: 150 LBS | HEIGHT: 72 IN | BODY MASS INDEX: 20.32 KG/M2 | RESPIRATION RATE: 20 BRPM

## 2025-05-13 VITALS
SYSTOLIC BLOOD PRESSURE: 140 MMHG | DIASTOLIC BLOOD PRESSURE: 60 MMHG | HEIGHT: 72 IN | WEIGHT: 150 LBS | OXYGEN SATURATION: 98 % | HEART RATE: 59 BPM | BODY MASS INDEX: 20.32 KG/M2

## 2025-05-13 DIAGNOSIS — K40.90 RIGHT INGUINAL HERNIA: ICD-10-CM

## 2025-05-13 DIAGNOSIS — Z85.46 HISTORY OF PROSTATE CANCER: ICD-10-CM

## 2025-05-13 DIAGNOSIS — R31.0 GROSS HEMATURIA: Primary | ICD-10-CM

## 2025-05-13 DIAGNOSIS — R19.4 CHANGE IN BOWEL HABITS: Primary | ICD-10-CM

## 2025-05-13 DIAGNOSIS — I10 PRIMARY HYPERTENSION: ICD-10-CM

## 2025-05-13 DIAGNOSIS — N31.9 NEUROGENIC BLADDER: ICD-10-CM

## 2025-05-13 DIAGNOSIS — Z97.8 FOLEY CATHETER IN PLACE: ICD-10-CM

## 2025-05-13 DIAGNOSIS — G30.1 MILD LATE ONSET ALZHEIMER'S DEMENTIA WITHOUT BEHAVIORAL DISTURBANCE, PSYCHOTIC DISTURBANCE, MOOD DISTURBANCE, OR ANXIETY (HCC): ICD-10-CM

## 2025-05-13 DIAGNOSIS — R33.9 RETENTION OF URINE: ICD-10-CM

## 2025-05-13 DIAGNOSIS — F02.A0 MILD LATE ONSET ALZHEIMER'S DEMENTIA WITHOUT BEHAVIORAL DISTURBANCE, PSYCHOTIC DISTURBANCE, MOOD DISTURBANCE, OR ANXIETY (HCC): ICD-10-CM

## 2025-05-13 PROCEDURE — G8420 CALC BMI NORM PARAMETERS: HCPCS | Performed by: INTERNAL MEDICINE

## 2025-05-13 PROCEDURE — G8420 CALC BMI NORM PARAMETERS: HCPCS

## 2025-05-13 PROCEDURE — G8427 DOCREV CUR MEDS BY ELIG CLIN: HCPCS

## 2025-05-13 PROCEDURE — 1159F MED LIST DOCD IN RCRD: CPT | Performed by: INTERNAL MEDICINE

## 2025-05-13 PROCEDURE — 1036F TOBACCO NON-USER: CPT

## 2025-05-13 PROCEDURE — 1159F MED LIST DOCD IN RCRD: CPT

## 2025-05-13 PROCEDURE — G8427 DOCREV CUR MEDS BY ELIG CLIN: HCPCS | Performed by: INTERNAL MEDICINE

## 2025-05-13 PROCEDURE — 99214 OFFICE O/P EST MOD 30 MIN: CPT

## 2025-05-13 PROCEDURE — 99213 OFFICE O/P EST LOW 20 MIN: CPT | Performed by: INTERNAL MEDICINE

## 2025-05-13 PROCEDURE — 1123F ACP DISCUSS/DSCN MKR DOCD: CPT | Performed by: INTERNAL MEDICINE

## 2025-05-13 PROCEDURE — 1036F TOBACCO NON-USER: CPT | Performed by: INTERNAL MEDICINE

## 2025-05-13 PROCEDURE — 1123F ACP DISCUSS/DSCN MKR DOCD: CPT

## 2025-05-13 RX ORDER — AMLODIPINE BESYLATE 10 MG/1
10 TABLET ORAL DAILY
Qty: 90 TABLET | Refills: 3 | Status: SHIPPED | OUTPATIENT
Start: 2025-05-13

## 2025-05-13 RX ORDER — METOPROLOL TARTRATE 25 MG/1
25 TABLET, FILM COATED ORAL 2 TIMES DAILY
Qty: 180 TABLET | Refills: 3 | Status: SHIPPED | OUTPATIENT
Start: 2025-05-13

## 2025-05-13 RX ORDER — LISINOPRIL 10 MG/1
10 TABLET ORAL 2 TIMES DAILY
Qty: 180 TABLET | Refills: 3 | Status: SHIPPED | OUTPATIENT
Start: 2025-05-13

## 2025-05-13 SDOH — ECONOMIC STABILITY: FOOD INSECURITY: WITHIN THE PAST 12 MONTHS, THE FOOD YOU BOUGHT JUST DIDN'T LAST AND YOU DIDN'T HAVE MONEY TO GET MORE.: NEVER TRUE

## 2025-05-13 SDOH — ECONOMIC STABILITY: FOOD INSECURITY: WITHIN THE PAST 12 MONTHS, YOU WORRIED THAT YOUR FOOD WOULD RUN OUT BEFORE YOU GOT MONEY TO BUY MORE.: NEVER TRUE

## 2025-05-13 ASSESSMENT — PATIENT HEALTH QUESTIONNAIRE - PHQ9
SUM OF ALL RESPONSES TO PHQ QUESTIONS 1-9: 0
1. LITTLE INTEREST OR PLEASURE IN DOING THINGS: NOT AT ALL
SUM OF ALL RESPONSES TO PHQ QUESTIONS 1-9: 0
SUM OF ALL RESPONSES TO PHQ QUESTIONS 1-9: 0
2. FEELING DOWN, DEPRESSED OR HOPELESS: NOT AT ALL
SUM OF ALL RESPONSES TO PHQ QUESTIONS 1-9: 0

## 2025-05-13 NOTE — PROGRESS NOTES
[facility] [place] [facility]  [facility]  [address]  [address]  [place] [id number]  Dept: [phone]  Loc: [phone]  Visit Date: 5/13/2025      HPI  Phillip Bates is a 89 y.o. male that presents to the urology clinic for ED UTI and hydronephrosis follow-up.    Patient with chronic indwelling ortega catheter due to his neurogenic bladder. Ortega exchanges backed out to every 3 weeks. Working well. Flushing BID with 60 oz of sterile water. Previously doing well with his current routine up until his recent ED visit to [facility] on 04/10/25 for hematuria and flank pain where he was found to have moderate to severe bilateral hydronephrosis and a UTI for which he received CTX 1g IV and ortega exchange. Sent home on Cefdinir. BL hydronephrosis and hematuria now resolved.    Dementia patient.      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, unspecified stricture type      Past Surgical History:   Procedure Laterality Date    COLONOSCOPY      CYSTOSCOPY Left 10/14/2021    CYSTOSCOPY EVACUATION OF CLOTS, EVACUATION OF BLADDER STONE, CHANNEL TURP  performed by Walter Rubio MD at [facility] OR    HEMORRHOID SURGERY  1970    HERNIA REPAIR      HIP SURGERY Left 1/6/2024    Left Hip Hemiarthroplasty performed by Terry Armendariz DO at [facility] OR    FL COLONOSCOPY FLX DX W/COLLJ SPEC WHEN PFRMD Left 6/11/2018    COLONOSCOPY performed by Mike Ford MD at [facility] Endoscopy    PROSTATE SURGERY  2005    seed implant    TONSILLECTOMY       Family History   Problem Relation Age of Onset    Diabetes Father      Outpatient Medications Marked as Taking

## 2025-05-13 NOTE — PROGRESS NOTES
Phillip Bates (:  1935) is a 89 y.o. male,Established patient, here for evaluation of the following chief complaint(s):  Hypertension, sleep disturbance (Using tylenol pm nightly-seems to work well ), and Diarrhea (Pt stopped wheat and dairy and stools are better)         Assessment & Plan  Primary hypertension   - controlled for age; continue meds    Orders:    metoprolol tartrate (LOPRESSOR) 25 MG tablet; Take 1 tablet by mouth 2 times daily    lisinopril (PRINIVIL;ZESTRIL) 10 MG tablet; Take 1 tablet by mouth 2 times daily    amLODIPine (NORVASC) 10 MG tablet; Take 1 tablet by mouth daily    Change in bowel habits    - pt had loose stools for one year; altered diet, omitted wheat and dairy and problem resolved.          Ortega catheter in place    - recent bleeding; to see urologist today         Mild late onset Alzheimer's dementia without behavioral disturbance, psychotic disturbance, mood disturbance, or anxiety (HCC)    - wife states he has dementia, on med for it.             Return in about 6 months (around 2025).       Subjective   HPI pt with hx hbp, prostate ca, urinary retention seen in f/u.  Has had several falls.  Ongoing issues off and on with ortega.  Uses cane.  Bowels ok now.     Review of Systems   - dementia    Objective   Physical Exam   BP (!) 140/60 (BP Site: Right Upper Arm)   Pulse 59   Ht 1.829 m (6' 0.01\")   Wt 68 kg (150 lb)   SpO2 98% Comment: at rest on room air  BMI 20.34 kg/m²     General appearance - alert, well appearing, and in no distress    Mental Status - alert, oriented to person, place, and time          Neck - supple, no significant adenopathy        Chest - clear to auscultation, no wheezes, rales or rhonchi, symmetric air entry     Heart - normal rate, regular rhythm, normal S1, S2, no murmurs, rubs, clicks or gallops     Abdomen - soft, nontender, nondistended, no masses or organomegaly         Neurological - alert, oriented, normal speech, no

## 2025-07-02 ENCOUNTER — TELEPHONE (OUTPATIENT)
Dept: INTERNAL MEDICINE CLINIC | Age: 89
End: 2025-07-02

## 2025-07-02 NOTE — TELEPHONE ENCOUNTER
Kathleen from [facility] called.  Pt reports a fall 2 days ago in the kitchen.  He landed on his bottom.  He did not seek medical attention.   This is about the 3rd fall he has had in the last year.  She mentioned to pt about getting some OP therapy to come into the home and he declined.  He looked up some strengthening exercises  and prefers to work on this on his own.

## 2025-07-02 NOTE — TELEPHONE ENCOUNTER
I spoke with pt and advised him dr grande feels he might benefit from a walker.  Pt states he does not think it is a walking problem.  He bent over to pick something up and lost his balance.  He has a balance issue.  He is doing one leg stands and walking laps in the house.  He has a cane but does not really want the walker.  I advised him if things change to let us know.  He thanks Dr Grande for his concern.

## 2025-07-23 ENCOUNTER — TELEPHONE (OUTPATIENT)
Dept: UROLOGY | Age: 89
End: 2025-07-23

## 2025-07-23 NOTE — TELEPHONE ENCOUNTER
Janeen at [facility] [phone] stated theres was more resistance with the catheter exchange and it was harder to insert it.    Patient would like to know if he can have a void trial.     Last appointment 05/13/2025  Next appointment 02/04/2026    Advised last office note states he has a neurogenic bladder and history of bilateral hydronephrosis and there is increase risk of retention and reoccurrence hydronephrosis if ortega left out.    Janeen would like called for any recommendations.

## 2025-07-24 NOTE — TELEPHONE ENCOUNTER
Voicemail left for Janeen with detailed message and if patient needs to discuss a sp catheter to call the office for an appointment.

## 2025-07-24 NOTE — TELEPHONE ENCOUNTER
No void trial is not appropriate. Patient with history of neurogenic bladder. Could discuss switching to SPT if unable to replace urethral foleys...

## (undated) DEVICE — 450 ML BOTTLE OF 0.05% CHLORHEXIDINE GLUCONATE IN 99.95% STERILE WATER FOR IRRIGATION, USP AND APPLICATOR.: Brand: IRRISEPT ANTIMICROBIAL WOUND LAVAGE

## (undated) DEVICE — CONNECTOR TBNG AUX H2O JET DISP FOR OLY 160/180 SER

## (undated) DEVICE — GLOVE ORANGE PI 8   MSG9080

## (undated) DEVICE — HF-RESECTION ELECTRODE PLASMALOOP LOOP, MEDIUM, 24 FR., 12°-30°, ESG TURIS: Brand: OLYMPUS

## (undated) DEVICE — SHEET,DRAPE,3/4,53X77,STERILE: Brand: MEDLINE

## (undated) DEVICE — PAD HIP IMMOB

## (undated) DEVICE — 3M™ IOBAN™ 2 ANTIMICROBIAL INCISE DRAPE 6650EZ: Brand: IOBAN™ 2

## (undated) DEVICE — SUTURE VCRL + SZ 2-0 L27IN ABSRB UD CP-1 1/2 CIR REV CUT VCP266H

## (undated) DEVICE — FAN SPRAY KIT: Brand: PULSAVAC®

## (undated) DEVICE — SET ADMIN 25ML L117IN PMP MOD CK VLV RLER CLMP 2 SMRTSITE

## (undated) DEVICE — CATHETER URETH 22FR 30CC BLLN F 3 W SPEC M RND TIP TWO

## (undated) DEVICE — GLOVE ORANGE PI 7 1/2   MSG9075

## (undated) DEVICE — VORTEX VACUUM MIXING SYSTEM: Brand: VORTEX

## (undated) DEVICE — DUAL CUT SAGITTAL BLADE

## (undated) DEVICE — SOLUTION IRRIG 3000ML 0.9% SOD CHL USP UROMATIC PLAS CONT

## (undated) DEVICE — CANISTER, RIGID, 2000CC: Brand: MEDLINE INDUSTRIES, INC.

## (undated) DEVICE — Device

## (undated) DEVICE — IV START KIT: Brand: MEDLINE INDUSTRIES, INC.

## (undated) DEVICE — ENDO KIT: Brand: MEDLINE INDUSTRIES, INC.

## (undated) DEVICE — SOLUTION IV 1000ML 0.45% SOD CHL PH 5 INJ USP VIAFLX PLAS

## (undated) DEVICE — DRESSING HYDROFIBER AQUACEL AG ADVANTAGE 3.5X12 IN

## (undated) DEVICE — ADHESIVE SKIN CLOSURE WND 8.661X1.5 IN 22 CM LIQUIBAND SECUR

## (undated) DEVICE — SUTURE ETHBND EXCEL SZ 2 L30IN NONABSORBABLE GRN L40MM V-37 MX69G

## (undated) DEVICE — SET LNR RED GRN W/ BASE CLEANASCOPE

## (undated) DEVICE — PACK-MAJOR

## (undated) DEVICE — PREP IM ENCHANCED TOTAL HIP BONE                                    PREPARATION KIT: Brand: PREP-IM

## (undated) DEVICE — HEWSON SUTURE RETRIEVER: Brand: HEWSON SUTURE RETRIEVER

## (undated) DEVICE — CYSTO PACK: Brand: MEDLINE INDUSTRIES, INC.

## (undated) DEVICE — BANDAGE COBAN 6 IN WND 6INX5YD FOAM

## (undated) DEVICE — SOLUTION IV IRRIG WATER 1000ML POUR BRL 2F7114

## (undated) DEVICE — SUTURE VCRL + SZ 0 L27IN ABSRB UD CT-1 L36MM 1/2 CIR TAPR VCP260H

## (undated) DEVICE — DRESSING ANITMICROBIAL FOAM OPTIFOAM POSTOP STRP 35 X 10 IN

## (undated) DEVICE — CATHETER ETER IV 22GA L1IN POLYUR STR RADPQ INTROCAN SFTY